# Patient Record
Sex: MALE | Race: WHITE | NOT HISPANIC OR LATINO | URBAN - METROPOLITAN AREA
[De-identification: names, ages, dates, MRNs, and addresses within clinical notes are randomized per-mention and may not be internally consistent; named-entity substitution may affect disease eponyms.]

---

## 2021-07-26 ENCOUNTER — EMERGENCY (EMERGENCY)
Facility: HOSPITAL | Age: 66
LOS: 1 days | Discharge: LEFT BEFORE DISCHARGE | End: 2021-07-26
Attending: EMERGENCY MEDICINE | Admitting: EMERGENCY MEDICINE
Payer: MEDICAID

## 2021-07-26 VITALS
OXYGEN SATURATION: 97 % | WEIGHT: 171.96 LBS | DIASTOLIC BLOOD PRESSURE: 87 MMHG | RESPIRATION RATE: 16 BRPM | HEIGHT: 69 IN | TEMPERATURE: 98 F | HEART RATE: 31 BPM | SYSTOLIC BLOOD PRESSURE: 143 MMHG

## 2021-07-26 PROCEDURE — 99282 EMERGENCY DEPT VISIT SF MDM: CPT

## 2021-07-26 NOTE — ED PROVIDER NOTE - NS ED ROS FT
Constitutional: - Fever, - Chills, - Anorexia, - Fatigue, - Night sweats  Eyes: - Discharge, - Irritation, - Redness, - Visual changes, - Light sensitivity, - Pain  EARS: - Ear Pain, - Tinnitus, - Decreased hearing  NOSE: - Congestion, - Bloody nose  MOUTH/THROAT: - Vocal Changes, - Drooling, - Sore throat  NECK: - Lumps, - Stiffness, - Pain  CV: - Palpitations, - Chest Pain, - Edema, - Syncope  RESP:  - Cough, - Shortness of Breath, - Dyspnea on Exertion, - Trouble speaking, - Pleuritic pain - Wheezing  GI: - Diarrhea, - Constipation, - Bloody stools, - Nausea, - Vomiting, - Abdominal Pain  : - Dysuria, -Frequency, - Hematuria, - Hesitancy, - Incontinence, - Saddle Anesthesia, - Abnormal discharge  MSK: - Myalgias, - Arthralgias, - Weakness, - Deformities, +elbow pain  SKIN: - Color change, - Rash, + Swelling, - Ecchymosis, - Abrasion, - laceration  NEURO: - Change in behavior, - Dec. Alertness, - Headache, - Dizziness, - Change in speech, - Weakness, - Seizure-like activity, - Difficulty ambulating

## 2021-07-26 NOTE — ED PROVIDER NOTE - PROGRESS NOTE DETAILS
The patient has decided to leave against medical advice (AMA).  I have made reasonable attempts to explain to the patient that leaving prior to completion of work up and treatment may result in recurrent or worsening of symptoms, severe permanent disability, pain and suffering, harm, injury, and/or death.  I have explained the risks, benefits, and alternatives to treatment as well as the attendant risks of refusing treatment at this time.  The patient has demonstrated comprehension and verbalizes understanding of these risks.  The patient has been told that they must return to the ER immediately for persistent or recurring symptoms, worsening symptoms, or any concerning symptoms.  The patient has also been informed that they may return to the ER immediately at any time if they change their mind and wish to resume care. The patient has been given the opportunity to ask questions and have them fully answered. pt declining bloodwork, XR, workup for possible septic joint, will leave AMA pt declining bloodwork, XR, workup for possible septic joint, will leave AMA, states he has to tend to his mother at home and can't wait for all these tests, wants to f/u with is pmd

## 2021-07-26 NOTE — ED PROVIDER NOTE - NSFOLLOWUPINSTRUCTIONS_ED_ALL_ED_FT
1) Follow-up with your Primary Medical Doctor and Jose. Call today / next business day for prompt follow-up.  2) Return to Emergency room for any worsening or persistent pain, weakness, fever, or any other concerning symptoms.  3) See attached instruction sheets for additional information, including information regarding signs and symptoms to look out for, reasons to seek immediate care and other important instructions.  4) Follow-up with any specialists as discussed / noted as well.   5) You are leaving against medical advice and understand the risk of having a life threatening condition

## 2021-07-26 NOTE — ED PROVIDER NOTE - PHYSICAL EXAMINATION
Gen: Alert, NAD  Head/eyes: NC/AT, PERRL  ENT: airway patent  Neck: supple, no tenderness/meningismus/JVD, Trachea midline  Pulm/lung: Bilateral clear BS, normal resp effort, no wheeze/stridor/retractions  CV/heart: RRR, no M/R/G, +2 dist pulses (radial, pedal DP/PT, popliteal)  GI/Abd: soft, NT/ND, +BS, no guarding/rebound tenderness  Musculoskeletal: no edema/erythema/cyanosis, FROM in all extremities, no C/T/L spine ttp  Skin: left elbow +swelling, +erythema, +warmth, held in flexion, unable to extend secondary to pain  Neuro: AAOx3, CN 2-12 intact, normal sensation, 5/5 motor strength in all extremities, normal gait Statement Selected

## 2021-07-26 NOTE — ED PROVIDER NOTE - OBJECTIVE STATEMENT
67 yo male hx of gout c/o several days of left elbow pain, swelling, and redness and warmth, nonradiating, moderate, no medications taken, here for evaluation. Denies fever/chills.

## 2021-07-26 NOTE — ED PROVIDER NOTE - CARE PROVIDER_API CALL
Davide Garcia (MD)  Orthopaedic Surgery  [address] [place] [address]  Phone: [phone]  Fax: [phone]  Follow Up Time:

## 2021-07-26 NOTE — ED PROVIDER NOTE - PATIENT PORTAL LINK FT
You can access the FollowMyHealth Patient Portal offered by [facility] by registering at the following website: [email or web address]. By joining [employer]s FollowMyHealth portal, you will also be able to view your health information using other applications (apps) compatible with our system.

## 2022-03-13 ENCOUNTER — EMERGENCY (EMERGENCY)
Facility: HOSPITAL | Age: 67
LOS: 1 days | Discharge: HOME | End: 2022-03-13
Attending: EMERGENCY MEDICINE | Admitting: EMERGENCY MEDICINE
Payer: MEDICAID

## 2022-03-13 VITALS
HEIGHT: 69 IN | DIASTOLIC BLOOD PRESSURE: 88 MMHG | OXYGEN SATURATION: 99 % | HEART RATE: 80 BPM | WEIGHT: 179.9 LBS | SYSTOLIC BLOOD PRESSURE: 150 MMHG | RESPIRATION RATE: 18 BRPM | TEMPERATURE: 99 F

## 2022-03-13 VITALS
DIASTOLIC BLOOD PRESSURE: 82 MMHG | HEART RATE: 80 BPM | RESPIRATION RATE: 18 BRPM | TEMPERATURE: 99 F | SYSTOLIC BLOOD PRESSURE: 145 MMHG | OXYGEN SATURATION: 99 %

## 2022-03-13 PROCEDURE — 99283 EMERGENCY DEPT VISIT LOW MDM: CPT

## 2022-03-13 RX ORDER — LIDOCAINE 4 G/100G
10 CREAM TOPICAL ONCE
Refills: 0 | Status: COMPLETED | OUTPATIENT
Start: 2022-03-13 | End: 2022-03-13

## 2022-03-13 RX ADMIN — Medication 30 MILLILITER(S): at 22:53

## 2022-03-13 RX ADMIN — LIDOCAINE 10 MILLILITER(S): 4 CREAM TOPICAL at 22:53

## 2022-03-13 NOTE — ED PROVIDER NOTE - PROGRESS NOTE DETAILS
pt offered ct neck or other eval, and refusing, wants medicine to make dry throat go away or else wants to leave

## 2022-03-13 NOTE — ED PROVIDER NOTE - CLINICAL SUMMARY MEDICAL DECISION MAKING FREE TEXT BOX
pt with throat dryness states over one week. pt now states feels so dry in throat that gagging despite honey, cough drops. pt just tx with abx by  for sinus infection. pt requesting medication but requests no workup. pt exam op clear, no stridor, lungs cta,

## 2022-03-13 NOTE — ED PROVIDER NOTE - CONSTITUTIONAL, MLM
normal... non toxic, awake, alert, oriented to person, place, time/situation and in no apparent distress.  constantly making gagging noises as if trying to clear throat

## 2022-03-13 NOTE — ED ADULT NURSE NOTE - NSIMPLEMENTINTERV_GEN_ALL_ED
Implemented All Fall Risk Interventions:  [place] to call system. Call bell, personal items and telephone within reach. Instruct patient to call for assistance. Room bathroom lighting operational. Non-slip footwear when patient is off stretcher. Physically safe environment: no spills, clutter or unnecessary equipment. Stretcher in lowest position, wheels locked, appropriate side rails in place. Provide visual cue, wrist band, yellow gown, etc. Monitor gait and stability. Monitor for mental status changes and reorient to person, place, and time. Review medications for side effects contributing to fall risk. Reinforce activity limits and safety measures with patient and family.

## 2022-03-13 NOTE — ED PROVIDER NOTE - OBJECTIVE STATEMENT
pt is a 66 yo male pw cc of one week of throat dry. pt went to  and told sinusitis and tx with abx. pt states no hoarseness or sob, no fb sensation.  pt states no relief with cough drops or honey and requesting medicine to fix throat, refusing tests.  no f/c. oral swelling, stridor, pt has appointment with pmd in am.

## 2022-03-13 NOTE — ED PROVIDER NOTE - CARE PROVIDER_API CALL
Sandeep Gannon (DO)  Surgery  [address] [place] [address]  Phone: [phone]  Fax: [phone]  Follow Up Time: 1-3 Days

## 2022-03-13 NOTE — ED PROVIDER NOTE - PATIENT PORTAL LINK FT
You can access the FollowMyHealth Patient Portal offered by [facility] by registering at the following website: [email or web address]. By joining [employer]’s FollowMyHealth portal, you will also be able to view your health information using other applications (apps) compatible with our system.

## 2022-03-13 NOTE — ED PROVIDER NOTE - NSFOLLOWUPINSTRUCTIONS_ED_ALL_ED_FT
lemon drops, drink lots of fluids,  return for drooling, shortness of breath,  uncontrolled vomiting  call dr Gannon of ent in am for follow up or follow with your doctor

## 2022-03-14 ENCOUNTER — EMERGENCY (EMERGENCY)
Facility: HOSPITAL | Age: 67
LOS: 1 days | Discharge: HOME | End: 2022-03-14
Attending: EMERGENCY MEDICINE | Admitting: EMERGENCY MEDICINE
Payer: MEDICAID

## 2022-03-14 VITALS
OXYGEN SATURATION: 100 % | DIASTOLIC BLOOD PRESSURE: 92 MMHG | HEIGHT: 69 IN | SYSTOLIC BLOOD PRESSURE: 140 MMHG | WEIGHT: 179.9 LBS | TEMPERATURE: 98 F | HEART RATE: 80 BPM | RESPIRATION RATE: 16 BRPM

## 2022-03-14 PROBLEM — M10.9 GOUT, UNSPECIFIED: Chronic | Status: ACTIVE | Noted: 2021-07-26

## 2022-03-14 PROBLEM — N40.0 BENIGN PROSTATIC HYPERPLASIA WITHOUT LOWER URINARY TRACT SYMPTOMS: Chronic | Status: ACTIVE | Noted: 2021-07-26

## 2022-03-14 PROCEDURE — 99283 EMERGENCY DEPT VISIT LOW MDM: CPT

## 2022-03-14 RX ORDER — LIDOCAINE 4 G/100G
5 CREAM TOPICAL
Qty: 40 | Refills: 0
Start: 2022-03-14 | End: 2022-03-21

## 2022-03-14 RX ORDER — LIDOCAINE 4 G/100G
10 CREAM TOPICAL ONCE
Refills: 0 | Status: COMPLETED | OUTPATIENT
Start: 2022-03-14 | End: 2022-03-14

## 2022-03-14 RX ADMIN — Medication 30 MILLILITER(S): at 23:26

## 2022-03-14 RX ADMIN — LIDOCAINE 10 MILLILITER(S): 4 CREAM TOPICAL at 23:26

## 2022-03-14 NOTE — ED ADULT TRIAGE NOTE - CHIEF COMPLAINT QUOTE
pt has had a cough x1 week. Pt states he cant sleep and would like his throat numbed "because they did that yesterday here and it helped". no SOB, no chest pain

## 2022-03-14 NOTE — ED ADULT TRIAGE NOTE - DOMESTIC TRAVEL HIGH RISK QUESTION
Mickie Don was seen and treated in our emergency department on 7/8/2020.  She may return to work on 07/10/2020.       If you have any questions or concerns, please don't hesitate to call.      Audrey Powers PA-C No

## 2022-03-14 NOTE — ED PROVIDER NOTE - PATIENT PORTAL LINK FT
You can access the FollowMyHealth Patient Portal offered by [facility] by registering at the following website: [email or web address]. By joining [email or web address]’s FollowMyHealth portal, you will also be able to view your health information using other applications (apps) compatible with our system.

## 2022-03-14 NOTE — ED PROVIDER NOTE - OBJECTIVE STATEMENT
66 y/o M with dry painful throat after taking antibiotics for his sinus infection.  pt was seen in the ER yesterday and would like similar medication for his pain.  pt declines any labs, images.

## 2022-04-27 ENCOUNTER — TRANSCRIPTION ENCOUNTER (OUTPATIENT)
Age: 67
End: 2022-04-27

## 2022-04-28 ENCOUNTER — EMERGENCY (EMERGENCY)
Facility: HOSPITAL | Age: 67
LOS: 1 days | Discharge: HOME | End: 2022-04-28
Attending: INTERNAL MEDICINE | Admitting: INTERNAL MEDICINE
Payer: MEDICAID

## 2022-04-28 ENCOUNTER — RESULT REVIEW (OUTPATIENT)
Age: 67
End: 2022-04-28

## 2022-04-28 ENCOUNTER — INPATIENT (INPATIENT)
Facility: HOSPITAL | Age: 67
LOS: 4 days | Discharge: HOME | DRG: 853 | End: 2022-05-03
Attending: SURGERY | Admitting: SURGERY
Payer: MEDICAID

## 2022-04-28 VITALS
HEART RATE: 82 BPM | DIASTOLIC BLOOD PRESSURE: 90 MMHG | TEMPERATURE: 98 F | OXYGEN SATURATION: 99 % | RESPIRATION RATE: 15 BRPM | WEIGHT: 175.05 LBS | HEIGHT: 69 IN | SYSTOLIC BLOOD PRESSURE: 160 MMHG

## 2022-04-28 VITALS
SYSTOLIC BLOOD PRESSURE: 107 MMHG | HEART RATE: 147 BPM | WEIGHT: 175.05 LBS | OXYGEN SATURATION: 96 % | RESPIRATION RATE: 22 BRPM | HEIGHT: 69 IN | DIASTOLIC BLOOD PRESSURE: 68 MMHG | TEMPERATURE: 99 F

## 2022-04-28 DIAGNOSIS — K35.80 UNSPECIFIED ACUTE APPENDICITIS: ICD-10-CM

## 2022-04-28 LAB
ALBUMIN SERPL ELPH-MCNC: 3.3 G/DL — SIGNIFICANT CHANGE UP (ref 3.3–5)
ALP SERPL-CCNC: 102 U/L — SIGNIFICANT CHANGE UP (ref 40–120)
ALT FLD-CCNC: 30 U/L — SIGNIFICANT CHANGE UP (ref 12–78)
ANION GAP SERPL CALC-SCNC: 10 MMOL/L — SIGNIFICANT CHANGE UP (ref 5–17)
APPEARANCE UR: ABNORMAL
APTT BLD: 26.7 SEC — LOW (ref 27.5–35.5)
AST SERPL-CCNC: 22 U/L — SIGNIFICANT CHANGE UP (ref 15–37)
BACTERIA # UR AUTO: ABNORMAL
BASOPHILS # BLD AUTO: 0 K/UL — SIGNIFICANT CHANGE UP (ref 0–0.2)
BASOPHILS NFR BLD AUTO: 0 % — SIGNIFICANT CHANGE UP (ref 0–2)
BILIRUB SERPL-MCNC: 1.9 MG/DL — HIGH (ref 0.2–1.2)
BILIRUB UR-MCNC: NEGATIVE — SIGNIFICANT CHANGE UP
BLD GP AB SCN SERPL QL: SIGNIFICANT CHANGE UP
BUN SERPL-MCNC: 30 MG/DL — HIGH (ref 7–23)
CALCIUM SERPL-MCNC: 9.8 MG/DL — SIGNIFICANT CHANGE UP (ref 8.5–10.1)
CHLORIDE SERPL-SCNC: 107 MMOL/L — SIGNIFICANT CHANGE UP (ref 96–108)
CO2 SERPL-SCNC: 21 MMOL/L — LOW (ref 22–31)
COLOR SPEC: YELLOW — SIGNIFICANT CHANGE UP
CREAT SERPL-MCNC: 2.5 MG/DL — HIGH (ref 0.5–1.3)
DIFF PNL FLD: ABNORMAL
EGFR: 28 ML/MIN/1.73M2 — LOW
EOSINOPHIL # BLD AUTO: 0 K/UL — SIGNIFICANT CHANGE UP (ref 0–0.5)
EOSINOPHIL NFR BLD AUTO: 0 % — SIGNIFICANT CHANGE UP (ref 0–6)
EPI CELLS # UR: NEGATIVE — SIGNIFICANT CHANGE UP
GLUCOSE SERPL-MCNC: 119 MG/DL — HIGH (ref 70–99)
GLUCOSE UR QL: NEGATIVE — SIGNIFICANT CHANGE UP
HCT VFR BLD CALC: 42.2 % — SIGNIFICANT CHANGE UP (ref 39–50)
HGB BLD-MCNC: 13.7 G/DL — SIGNIFICANT CHANGE UP (ref 13–17)
INR BLD: 1.19 RATIO — HIGH (ref 0.88–1.16)
KETONES UR-MCNC: NEGATIVE — SIGNIFICANT CHANGE UP
LACTATE SERPL-SCNC: 1.3 MMOL/L — SIGNIFICANT CHANGE UP (ref 0.7–2)
LACTATE SERPL-SCNC: 2.4 MMOL/L — HIGH (ref 0.7–2)
LEUKOCYTE ESTERASE UR-ACNC: ABNORMAL
LYMPHOCYTES # BLD AUTO: 0.08 K/UL — LOW (ref 1–3.3)
LYMPHOCYTES # BLD AUTO: 1 % — LOW (ref 13–44)
MANUAL SMEAR VERIFICATION: SIGNIFICANT CHANGE UP
MCHC RBC-ENTMCNC: 28.5 PG — SIGNIFICANT CHANGE UP (ref 27–34)
MCHC RBC-ENTMCNC: 32.5 GM/DL — SIGNIFICANT CHANGE UP (ref 32–36)
MCV RBC AUTO: 87.9 FL — SIGNIFICANT CHANGE UP (ref 80–100)
MICROCYTES BLD QL: SLIGHT — SIGNIFICANT CHANGE UP
MONOCYTES # BLD AUTO: 0.08 K/UL — SIGNIFICANT CHANGE UP (ref 0–0.9)
MONOCYTES NFR BLD AUTO: 1 % — LOW (ref 2–14)
NEUTROPHILS # BLD AUTO: 7.6 K/UL — HIGH (ref 1.8–7.4)
NEUTROPHILS NFR BLD AUTO: 97 % — HIGH (ref 43–77)
NEUTS BAND # BLD: 1 % — SIGNIFICANT CHANGE UP (ref 0–8)
NITRITE UR-MCNC: NEGATIVE — SIGNIFICANT CHANGE UP
NRBC # BLD: 0 — SIGNIFICANT CHANGE UP
NRBC # BLD: SIGNIFICANT CHANGE UP /100 WBCS (ref 0–0)
PH UR: 6 — SIGNIFICANT CHANGE UP (ref 5–8)
PLAT MORPH BLD: NORMAL — SIGNIFICANT CHANGE UP
PLATELET # BLD AUTO: 245 K/UL — SIGNIFICANT CHANGE UP (ref 150–400)
POLYCHROMASIA BLD QL SMEAR: SLIGHT — SIGNIFICANT CHANGE UP
POTASSIUM SERPL-MCNC: 4.1 MMOL/L — SIGNIFICANT CHANGE UP (ref 3.5–5.3)
POTASSIUM SERPL-SCNC: 4.1 MMOL/L — SIGNIFICANT CHANGE UP (ref 3.5–5.3)
PROT SERPL-MCNC: 7.4 G/DL — SIGNIFICANT CHANGE UP (ref 6–8.3)
PROT UR-MCNC: 100
PROTHROM AB SERPL-ACNC: 14 SEC — HIGH (ref 10.5–13.4)
RBC # BLD: 4.8 M/UL — SIGNIFICANT CHANGE UP (ref 4.2–5.8)
RBC # FLD: 14.8 % — HIGH (ref 10.3–14.5)
RBC BLD AUTO: SIGNIFICANT CHANGE UP
RBC CASTS # UR COMP ASSIST: ABNORMAL /HPF (ref 0–4)
SARS-COV-2 RNA SPEC QL NAA+PROBE: SIGNIFICANT CHANGE UP
SODIUM SERPL-SCNC: 138 MMOL/L — SIGNIFICANT CHANGE UP (ref 135–145)
SP GR SPEC: 1.01 — SIGNIFICANT CHANGE UP (ref 1.01–1.02)
UROBILINOGEN FLD QL: NEGATIVE — SIGNIFICANT CHANGE UP
WBC # BLD: 7.75 K/UL — SIGNIFICANT CHANGE UP (ref 3.8–10.5)
WBC # FLD AUTO: 7.75 K/UL — SIGNIFICANT CHANGE UP (ref 3.8–10.5)
WBC UR QL: ABNORMAL

## 2022-04-28 PROCEDURE — 99284 EMERGENCY DEPT VISIT MOD MDM: CPT

## 2022-04-28 PROCEDURE — 88304 TISSUE EXAM BY PATHOLOGIST: CPT | Mod: 26

## 2022-04-28 PROCEDURE — 93005 ELECTROCARDIOGRAM TRACING: CPT

## 2022-04-28 PROCEDURE — 99223 1ST HOSP IP/OBS HIGH 75: CPT | Mod: 57

## 2022-04-28 PROCEDURE — 44970 LAPAROSCOPY APPENDECTOMY: CPT | Mod: AS

## 2022-04-28 PROCEDURE — 99285 EMERGENCY DEPT VISIT HI MDM: CPT

## 2022-04-28 PROCEDURE — 44970 LAPAROSCOPY APPENDECTOMY: CPT

## 2022-04-28 PROCEDURE — 99283 EMERGENCY DEPT VISIT LOW MDM: CPT

## 2022-04-28 PROCEDURE — 93010 ELECTROCARDIOGRAM REPORT: CPT

## 2022-04-28 PROCEDURE — 74176 CT ABD & PELVIS W/O CONTRAST: CPT | Mod: 26,MA

## 2022-04-28 PROCEDURE — 71045 X-RAY EXAM CHEST 1 VIEW: CPT | Mod: 26

## 2022-04-28 DEVICE — ARISTA 3GR: Type: IMPLANTABLE DEVICE | Status: FUNCTIONAL

## 2022-04-28 DEVICE — STAPLER COVIDIEN TRI-STAPLE CURVED 45MM TAN RELOAD: Type: IMPLANTABLE DEVICE | Status: FUNCTIONAL

## 2022-04-28 RX ORDER — SENNA PLUS 8.6 MG/1
2 TABLET ORAL AT BEDTIME
Refills: 0 | Status: DISCONTINUED | OUTPATIENT
Start: 2022-04-29 | End: 2022-05-03

## 2022-04-28 RX ORDER — ACETAMINOPHEN 500 MG
1000 TABLET ORAL ONCE
Refills: 0 | Status: COMPLETED | OUTPATIENT
Start: 2022-04-28 | End: 2022-04-28

## 2022-04-28 RX ORDER — SODIUM CHLORIDE 9 MG/ML
2500 INJECTION INTRAMUSCULAR; INTRAVENOUS; SUBCUTANEOUS ONCE
Refills: 0 | Status: COMPLETED | OUTPATIENT
Start: 2022-04-28 | End: 2022-04-28

## 2022-04-28 RX ORDER — SODIUM CHLORIDE 9 MG/ML
1000 INJECTION INTRAMUSCULAR; INTRAVENOUS; SUBCUTANEOUS
Refills: 0 | Status: DISCONTINUED | OUTPATIENT
Start: 2022-04-29 | End: 2022-04-29

## 2022-04-28 RX ORDER — SODIUM CHLORIDE 9 MG/ML
1000 INJECTION INTRAMUSCULAR; INTRAVENOUS; SUBCUTANEOUS
Refills: 0 | Status: DISCONTINUED | OUTPATIENT
Start: 2022-04-28 | End: 2022-04-28

## 2022-04-28 RX ORDER — HYDROMORPHONE HYDROCHLORIDE 2 MG/ML
0.5 INJECTION INTRAMUSCULAR; INTRAVENOUS; SUBCUTANEOUS
Refills: 0 | Status: DISCONTINUED | OUTPATIENT
Start: 2022-04-28 | End: 2022-04-29

## 2022-04-28 RX ORDER — ONDANSETRON 8 MG/1
4 TABLET, FILM COATED ORAL ONCE
Refills: 0 | Status: COMPLETED | OUTPATIENT
Start: 2022-04-28 | End: 2022-04-28

## 2022-04-28 RX ORDER — HYDROMORPHONE HYDROCHLORIDE 2 MG/ML
0.5 INJECTION INTRAMUSCULAR; INTRAVENOUS; SUBCUTANEOUS
Refills: 0 | Status: DISCONTINUED | OUTPATIENT
Start: 2022-04-29 | End: 2022-05-03

## 2022-04-28 RX ORDER — SODIUM CHLORIDE 9 MG/ML
1000 INJECTION, SOLUTION INTRAVENOUS
Refills: 0 | Status: DISCONTINUED | OUTPATIENT
Start: 2022-04-28 | End: 2022-04-29

## 2022-04-28 RX ORDER — OXYCODONE HYDROCHLORIDE 5 MG/1
5 TABLET ORAL EVERY 4 HOURS
Refills: 0 | Status: DISCONTINUED | OUTPATIENT
Start: 2022-04-29 | End: 2022-05-03

## 2022-04-28 RX ORDER — PIPERACILLIN AND TAZOBACTAM 4; .5 G/20ML; G/20ML
3.38 INJECTION, POWDER, LYOPHILIZED, FOR SOLUTION INTRAVENOUS ONCE
Refills: 0 | Status: COMPLETED | OUTPATIENT
Start: 2022-04-28 | End: 2022-04-28

## 2022-04-28 RX ORDER — ENOXAPARIN SODIUM 100 MG/ML
40 INJECTION SUBCUTANEOUS EVERY 24 HOURS
Refills: 0 | Status: DISCONTINUED | OUTPATIENT
Start: 2022-04-29 | End: 2022-05-03

## 2022-04-28 RX ORDER — TAMSULOSIN HYDROCHLORIDE 0.4 MG/1
0.8 CAPSULE ORAL ONCE
Refills: 0 | Status: DISCONTINUED | OUTPATIENT
Start: 2022-04-28 | End: 2022-05-03

## 2022-04-28 RX ORDER — PIPERACILLIN AND TAZOBACTAM 4; .5 G/20ML; G/20ML
3.38 INJECTION, POWDER, LYOPHILIZED, FOR SOLUTION INTRAVENOUS EVERY 8 HOURS
Refills: 0 | Status: DISCONTINUED | OUTPATIENT
Start: 2022-04-28 | End: 2022-04-28

## 2022-04-28 RX ORDER — LANOLIN ALCOHOL/MO/W.PET/CERES
5 CREAM (GRAM) TOPICAL AT BEDTIME
Refills: 0 | Status: DISCONTINUED | OUTPATIENT
Start: 2022-04-29 | End: 2022-05-03

## 2022-04-28 RX ORDER — ONDANSETRON 8 MG/1
4 TABLET, FILM COATED ORAL ONCE
Refills: 0 | Status: DISCONTINUED | OUTPATIENT
Start: 2022-04-28 | End: 2022-04-29

## 2022-04-28 RX ORDER — PANTOPRAZOLE SODIUM 20 MG/1
40 TABLET, DELAYED RELEASE ORAL ONCE
Refills: 0 | Status: DISCONTINUED | OUTPATIENT
Start: 2022-04-28 | End: 2022-04-28

## 2022-04-28 RX ORDER — HYDROMORPHONE HYDROCHLORIDE 2 MG/ML
0.5 INJECTION INTRAMUSCULAR; INTRAVENOUS; SUBCUTANEOUS EVERY 4 HOURS
Refills: 0 | Status: DISCONTINUED | OUTPATIENT
Start: 2022-04-28 | End: 2022-04-28

## 2022-04-28 RX ORDER — LIDOCAINE 4 G/100G
15 CREAM TOPICAL ONCE
Refills: 0 | Status: COMPLETED | OUTPATIENT
Start: 2022-04-28 | End: 2022-04-28

## 2022-04-28 RX ORDER — PIPERACILLIN AND TAZOBACTAM 4; .5 G/20ML; G/20ML
3.38 INJECTION, POWDER, LYOPHILIZED, FOR SOLUTION INTRAVENOUS EVERY 8 HOURS
Refills: 0 | Status: DISCONTINUED | OUTPATIENT
Start: 2022-04-29 | End: 2022-04-29

## 2022-04-28 RX ORDER — PANTOPRAZOLE SODIUM 20 MG/1
40 TABLET, DELAYED RELEASE ORAL DAILY
Refills: 0 | Status: DISCONTINUED | OUTPATIENT
Start: 2022-04-29 | End: 2022-05-03

## 2022-04-28 RX ORDER — ONDANSETRON 8 MG/1
4 TABLET, FILM COATED ORAL EVERY 6 HOURS
Refills: 0 | Status: DISCONTINUED | OUTPATIENT
Start: 2022-04-28 | End: 2022-04-28

## 2022-04-28 RX ORDER — ACETAMINOPHEN 500 MG
650 TABLET ORAL EVERY 6 HOURS
Refills: 0 | Status: DISCONTINUED | OUTPATIENT
Start: 2022-04-29 | End: 2022-05-03

## 2022-04-28 RX ORDER — PANTOPRAZOLE SODIUM 20 MG/1
40 TABLET, DELAYED RELEASE ORAL
Refills: 0 | Status: DISCONTINUED | OUTPATIENT
Start: 2022-04-28 | End: 2022-05-01

## 2022-04-28 RX ADMIN — PIPERACILLIN AND TAZOBACTAM 200 GRAM(S): 4; .5 INJECTION, POWDER, LYOPHILIZED, FOR SOLUTION INTRAVENOUS at 12:49

## 2022-04-28 RX ADMIN — LIDOCAINE 15 MILLILITER(S): 4 CREAM TOPICAL at 04:38

## 2022-04-28 RX ADMIN — SODIUM CHLORIDE 2500 MILLILITER(S): 9 INJECTION INTRAMUSCULAR; INTRAVENOUS; SUBCUTANEOUS at 12:49

## 2022-04-28 RX ADMIN — HYDROMORPHONE HYDROCHLORIDE 0.5 MILLIGRAM(S): 2 INJECTION INTRAMUSCULAR; INTRAVENOUS; SUBCUTANEOUS at 22:58

## 2022-04-28 RX ADMIN — ONDANSETRON 4 MILLIGRAM(S): 8 TABLET, FILM COATED ORAL at 12:48

## 2022-04-28 RX ADMIN — PIPERACILLIN AND TAZOBACTAM 3.38 GRAM(S): 4; .5 INJECTION, POWDER, LYOPHILIZED, FOR SOLUTION INTRAVENOUS at 12:19

## 2022-04-28 RX ADMIN — SODIUM CHLORIDE 75 MILLILITER(S): 9 INJECTION, SOLUTION INTRAVENOUS at 23:01

## 2022-04-28 RX ADMIN — HYDROMORPHONE HYDROCHLORIDE 0.5 MILLIGRAM(S): 2 INJECTION INTRAMUSCULAR; INTRAVENOUS; SUBCUTANEOUS at 23:13

## 2022-04-28 RX ADMIN — Medication 15 MILLILITER(S): at 04:38

## 2022-04-28 RX ADMIN — Medication 400 MILLIGRAM(S): at 12:49

## 2022-04-28 RX ADMIN — Medication 1000 MILLIGRAM(S): at 13:19

## 2022-04-28 RX ADMIN — PIPERACILLIN AND TAZOBACTAM 200 GRAM(S): 4; .5 INJECTION, POWDER, LYOPHILIZED, FOR SOLUTION INTRAVENOUS at 23:32

## 2022-04-28 RX ADMIN — Medication 1000 MILLIGRAM(S): at 13:04

## 2022-04-28 RX ADMIN — PANTOPRAZOLE SODIUM 40 MILLIGRAM(S): 20 TABLET, DELAYED RELEASE ORAL at 04:48

## 2022-04-28 NOTE — PATIENT PROFILE ADULT - FALL HARM RISK - UNIVERSAL INTERVENTIONS
Bed in lowest position, wheels locked, appropriate side rails in place/Call bell, personal items and telephone in reach/Instruct patient to call for assistance before getting out of bed or chair/Non-slip footwear when patient is out of bed/[place] to call system/Physically safe environment - no spills, clutter or unnecessary equipment/Purposeful Proactive Rounding/Room/bathroom lighting operational, light cord in reach

## 2022-04-28 NOTE — ED ADULT NURSE NOTE - OBJECTIVE STATEMENT
Pt. received alert and oriented x3 with chief complaint of body aches and chills since 1 hour prior to arrival to ED. Pt. placed on continuous cardiac monitor with continuous pulse ox. EKG performed at bedside upon arrival.

## 2022-04-28 NOTE — ED PROVIDER NOTE - PHYSICAL EXAMINATION
Vital signs as available reviewed.  General:  No acute distress.  Head:  Normocephalic, atraumatic.  Eyes:  Conjunctiva pink, no icterus.  Cardiovascular: tachycardia.  Respiratory:  Clear to auscultation, good air entry bilaterally.  Abdomen:  Soft,+ RLQ and right flank tenderness to palpation.  Musculoskeletal:  No obvious deformity.  Neurologic: Alert and oriented, moving all extremities.  Skin:  Warm and dry.

## 2022-04-28 NOTE — ED PROVIDER NOTE - CARE PROVIDER_API CALL
Connor Silva ()  Internal Medicine  [address] [place] [address]  Phone: [phone]  Fax: [phone]  Follow Up Time: 1-3 Days

## 2022-04-28 NOTE — H&P ADULT - ASSESSMENT
66 YO Male w/ PMHx of dry mouth, BPH (self-catheterizes), ?renal failure p/w abdominal pain, fever, chills, N/V x 1 day, CT with evidence of acute appendicitis & enlarged prostate. Temp of 100.2, WBC normal, BUN/Cr elevated 68 YO Male w/ PMHx of dry mouth, BPH (self-catheterizes), ?renal failure p/w abdominal pain, fever, chills, N/V x 1 day, CT with evidence of acute appendicitis & enlarged prostate. Temp of 100.2, WBC normal, BUN/Cr elevated      Mr. Aguillon was seen preoperatively in the holding area.      The history was confirmed.    An examination was personally performed.      The CT scan report and associated images were reviewed and discussed with in-house Radiology.    The risks, benefits and nature of the operation were discussed at length.      These points included, but were not limited to the possibility of conversion to open surgery, the possibility of bowel resection or enterotomy, the approximate size/ number/ location of the incisions, the probability of "scars" and the possibility of residual abdominal wall deformity.  We have discussed that appendicitis represents an infectious process and that infection of the skin or an intra-abdominal abscess post-operatively is possible.  We have discussed the possibility of open incision sites with packing or wound care.  We have discussed the possible use of intra-peritoneal drains.  We have discussed that a "normal" appendix is possible and that no "guarantee" of a diagnosis can be provided.  We have discussed that further additional pathology may be noted during laparoscopy.      We have discussed that all abdominal surgery creates the possibility of abdominal wall hernia and future intra-abdominal adhesions.  We have discussed that medical complications unrelated to the operation itself are possible, including but not limited to cardiopulmonary issues, deep vein thrombosis/ pulmonary embolism and urinary retention.  We have discussed that general anesthesia with intubation is required and that a Gavin catheter may be placed.       Mr. Aguillon demonstrated good understanding and remained eager to proceed.  An intravenous dosing of antibiotic was provided prior to the initiation of the procedure.

## 2022-04-28 NOTE — ED PROVIDER NOTE - OBJECTIVE STATEMENT
67 M history of dry mouth, BPH (self catheterizes) here complaining of abdominal pain, fevers, chills, nausea. started last night. Came to the ER for feeling of too much acid, got meds and refused further work up. now with RLQ pain, severe in nature, radiates to groin, intermittent. normal BMs, no vomiting, constipation, diarrhea. denies surgical history. recently started a medication for dry mouth, does not remember name. PMD Dr. Malloy.

## 2022-04-28 NOTE — ED PROVIDER NOTE - OBJECTIVE STATEMENT
Pt c/o stomach burning" after medication last night, states he took a Pepcid just prior to arrival  abdominal pain Pt c/o epigastric stomach burning" after medication last night, states he took a Pepcid just prior to arrival  abdominal pain 68 y/o male Pt c/o epigastric stomach burning" after medication last night, states he took a Pepcid just prior to arrival  no cp, no sob, no N/V, no GIB no fever, no chills, no urinary symptoms, no stroke symptoms

## 2022-04-28 NOTE — ED ADULT NURSE NOTE - OBJECTIVE STATEMENT
67 yr old male c/o epigastric burning that worsened tonight. A+O  x 4. Denies CP, sob, abdominal pain, NVD. PT refusing IV, labs, and ekg. Dr. Kong aware. will continue to monitor.

## 2022-04-28 NOTE — ED PROVIDER NOTE - CLINICAL SUMMARY MEDICAL DECISION MAKING FREE TEXT BOX
acute epigastric burning  patient is declining IV labs ekg enzymes  he will take PO medications and is requesting discharge, GI referral given

## 2022-04-28 NOTE — H&P ADULT - NSHPPHYSICALEXAM_GEN_ALL_CORE
PHYSICAL EXAM:  GENERAL: NAD, lying in bed comfortably  HEAD:  Atraumatic, Normocephalic  ABDOMEN: Soft, nondistended, +ttp in RLQ. No scars or lesions noted.  NERVOUS SYSTEM:  Alert & Oriented X3, speech clear. No deficits

## 2022-04-28 NOTE — H&P ADULT - PROBLEM SELECTOR PLAN 1
- OR today for laparoscopic possible open appendectomy  - Dr. Montes called- will be away this week, Dr. Vail (pulmonology) is aware and will see patient post-operatively  - Poss urology and/or nephro consult post-op  - NPO, IVF  - IV Zosyn given  - Pain control, supportive care  - Case discussed with Dr. Arellano

## 2022-04-28 NOTE — H&P ADULT - NSHPLABSRESULTS_GEN_ALL_CORE
Vital Signs Last 24 Hrs  T(C): 37.9 (28 Apr 2022 13:41), Max: 37.9 (28 Apr 2022 13:41)  T(F): 100.2 (28 Apr 2022 13:41), Max: 100.2 (28 Apr 2022 13:41)  HR: 99 (28 Apr 2022 13:41) (82 - 147)  BP: 114/74 (28 Apr 2022 13:41) (107/68 - 160/90)  BP(mean): --  RR: 18 (28 Apr 2022 13:41) (15 - 22)  SpO2: 99% (28 Apr 2022 13:41) (96% - 99%)                          13.7   7.75  )-----------( 245      ( 28 Apr 2022 13:04 )             42.2   04-28    138  |  107  |  30<H>  ----------------------------<  119<H>  4.1   |  21<L>  |  2.50<H>    Ca    9.8      28 Apr 2022 13:04    TPro  7.4  /  Alb  3.3  /  TBili  1.9<H>  /  DBili  x   /  AST  22  /  ALT  30  /  AlkPhos  102  04-28  < from: CT Abdomen and Pelvis No Cont (04.28.22 @ 14:22) >    FINDINGS:    LOWER CHEST:  Mild dependent bibasilar atelectatic changes.    The evaluation of the solid organ parenchyma is limited without   intravenous contrast.    LIVER: Within normal limits.  BILE DUCTS: Normal caliber.  GALLBLADDER: Within normal limits.  SPLEEN: Borderline enlarged.  PANCREAS: Within normal limits.  ADRENALS: Within normal limits.  KIDNEYS/URETERS:  Bilateral renal cysts and several small indeterminate hypodense right   renal lesions.  Small hyperdense lesion medial interpolar left kidney.  Atrophic right kidney.    Prominent right extrarenal pelvis.  Mild left-sided hydroureteronephrosis, without obstructing ureteral   calculus.    BLADDER: Small focus of air within the urinary bladder, correlate   clinically for recent instrumentation versus infection.  REPRODUCTIVE ORGANS: Markedly enlarged prostate gland with intravesicular   extension.    BOWEL: Small hiatal hernia.  No bowel obstruction.  Dilated appendix, measuring up to 2 cm, with 1 cm calcified appendicolith   at base and 1.4 cm calcified appendicolith within body. There is   significant periappendiceal stranding, with inflammatory stranding   extending along the right posterior flank and pelvis.  There is thickening at the cecal apex.  PERITONEUM: No ascites.  No localized intra-abdominal fluid collection or pneumoperitoneum noted.    VESSELS: Within normal limits.  RETROPERITONEUM/LYMPH NODES: No lymphadenopathy.    ABDOMINAL WALL: Small bilateral fat-containing inguinal hernias.    BONES: Within normal limits.    IMPRESSION:    CT findings compatible with acute appendicitis as discussed above.    Prostatomegaly.    Other findings as discussed above.

## 2022-04-28 NOTE — H&P ADULT - HISTORY OF PRESENT ILLNESS
68 YO Male w/ PMHx of dry mouth, BPH (self-catheterizes), ?renal failure p/w abdominal pain x 1 day. Patient seen in ED last night but apparently refused further work-up. Patient returned to ED today c/o RLQ pain radiating to the groin, N/V, chills and subjective fever since last night. Admits to diarrhea a few days ago, which has resolved. Patient has not eaten since yesterday. Denies chest pain, SOB, hematochezia, hematemesis, constipation, past surgical history. Patient had a colonoscopy >10 yrs ago, but cannot recall the results. Of note, patient recently started seeing Dr. Montes and was last seen 2 weeks ago where he was referred to a nephrologist due to concern for renal failure and need for dialysis, but patient could not recall exactly who he saw and where he went.

## 2022-04-28 NOTE — ED PROVIDER NOTE - PATIENT PORTAL LINK FT
You can access the FollowMyHealth Patient Portal offered by [facility] by registering at the following website: [email or web address]. By joining [employer]’s FollowMyHealth portal, you will also be able to view your health information using other applications (apps) compatible with our system.

## 2022-04-28 NOTE — ED PROVIDER NOTE - NS ED ROS FT
Constitutional: No + fevers, chills.  Neurological: No reported acute headache.  Eyes: No reported new vision changes.   Ears, Nose, Mouth, Throat: No reported acute sore throat.  Cardiovascular: No reported current chest pain.  Respiratory: No reported new shortness of breath.  Gastrointestinal: + abdominal pain.  Genitourinary: No reported new urinary problems.  Musculoskeletal: No reported acute extremity pain.  Integumentary (skin and/or breast): No reported new rash.

## 2022-04-28 NOTE — ED ADULT NURSE REASSESSMENT NOTE - NS ED NURSE REASSESS COMMENT FT1
Pt. caught drinking bottle of water after repeatedly being told by PATRIZIA Galvan not to eat or drink anything since pt. has arrived to ED. PATRIZIA Welch in OR notified.

## 2022-04-29 LAB
-  CTX-M RESISTANCE MARKER: SIGNIFICANT CHANGE UP
-  ESBL: SIGNIFICANT CHANGE UP
ALBUMIN SERPL ELPH-MCNC: 2.6 G/DL — LOW (ref 3.3–5)
ALP SERPL-CCNC: 85 U/L — SIGNIFICANT CHANGE UP (ref 40–120)
ALT FLD-CCNC: 30 U/L — SIGNIFICANT CHANGE UP (ref 12–78)
ANION GAP SERPL CALC-SCNC: 8 MMOL/L — SIGNIFICANT CHANGE UP (ref 5–17)
AST SERPL-CCNC: 21 U/L — SIGNIFICANT CHANGE UP (ref 15–37)
BILIRUB SERPL-MCNC: 1 MG/DL — SIGNIFICANT CHANGE UP (ref 0.2–1.2)
BUN SERPL-MCNC: 35 MG/DL — HIGH (ref 7–23)
CALCIUM SERPL-MCNC: 8.7 MG/DL — SIGNIFICANT CHANGE UP (ref 8.5–10.1)
CHLORIDE SERPL-SCNC: 107 MMOL/L — SIGNIFICANT CHANGE UP (ref 96–108)
CO2 SERPL-SCNC: 23 MMOL/L — SIGNIFICANT CHANGE UP (ref 22–31)
CREAT SERPL-MCNC: 2.7 MG/DL — HIGH (ref 0.5–1.3)
E COLI DNA BLD POS QL NAA+NON-PROBE: SIGNIFICANT CHANGE UP
EGFR: 25 ML/MIN/1.73M2 — LOW
GLUCOSE SERPL-MCNC: 126 MG/DL — HIGH (ref 70–99)
GRAM STN FLD: SIGNIFICANT CHANGE UP
HCT VFR BLD CALC: 37.8 % — LOW (ref 39–50)
HCV AB S/CO SERPL IA: 0.09 S/CO — SIGNIFICANT CHANGE UP (ref 0–0.99)
HCV AB SERPL-IMP: SIGNIFICANT CHANGE UP
HGB BLD-MCNC: 12.1 G/DL — LOW (ref 13–17)
MCHC RBC-ENTMCNC: 28.5 PG — SIGNIFICANT CHANGE UP (ref 27–34)
MCHC RBC-ENTMCNC: 32 GM/DL — SIGNIFICANT CHANGE UP (ref 32–36)
MCV RBC AUTO: 89.2 FL — SIGNIFICANT CHANGE UP (ref 80–100)
METHOD TYPE: SIGNIFICANT CHANGE UP
NRBC # BLD: 0 /100 WBCS — SIGNIFICANT CHANGE UP (ref 0–0)
PLATELET # BLD AUTO: 188 K/UL — SIGNIFICANT CHANGE UP (ref 150–400)
POTASSIUM SERPL-MCNC: 4.4 MMOL/L — SIGNIFICANT CHANGE UP (ref 3.5–5.3)
POTASSIUM SERPL-SCNC: 4.4 MMOL/L — SIGNIFICANT CHANGE UP (ref 3.5–5.3)
PROT SERPL-MCNC: 6.1 G/DL — SIGNIFICANT CHANGE UP (ref 6–8.3)
RBC # BLD: 4.24 M/UL — SIGNIFICANT CHANGE UP (ref 4.2–5.8)
RBC # FLD: 15.3 % — HIGH (ref 10.3–14.5)
SODIUM SERPL-SCNC: 138 MMOL/L — SIGNIFICANT CHANGE UP (ref 135–145)
SPECIMEN SOURCE: SIGNIFICANT CHANGE UP
SPECIMEN SOURCE: SIGNIFICANT CHANGE UP
WBC # BLD: 20.26 K/UL — HIGH (ref 3.8–10.5)
WBC # FLD AUTO: 20.26 K/UL — HIGH (ref 3.8–10.5)

## 2022-04-29 PROCEDURE — 93010 ELECTROCARDIOGRAM REPORT: CPT

## 2022-04-29 PROCEDURE — 99222 1ST HOSP IP/OBS MODERATE 55: CPT

## 2022-04-29 RX ORDER — MEROPENEM 1 G/30ML
500 INJECTION INTRAVENOUS EVERY 12 HOURS
Refills: 0 | Status: DISCONTINUED | OUTPATIENT
Start: 2022-04-29 | End: 2022-05-02

## 2022-04-29 RX ADMIN — PANTOPRAZOLE SODIUM 40 MILLIGRAM(S): 20 TABLET, DELAYED RELEASE ORAL at 11:26

## 2022-04-29 RX ADMIN — SENNA PLUS 2 TABLET(S): 8.6 TABLET ORAL at 21:45

## 2022-04-29 RX ADMIN — Medication 400 MILLIGRAM(S): at 05:32

## 2022-04-29 RX ADMIN — SODIUM CHLORIDE 50 MILLILITER(S): 9 INJECTION INTRAMUSCULAR; INTRAVENOUS; SUBCUTANEOUS at 14:58

## 2022-04-29 RX ADMIN — SODIUM CHLORIDE 100 MILLILITER(S): 9 INJECTION INTRAMUSCULAR; INTRAVENOUS; SUBCUTANEOUS at 11:24

## 2022-04-29 RX ADMIN — ENOXAPARIN SODIUM 40 MILLIGRAM(S): 100 INJECTION SUBCUTANEOUS at 14:59

## 2022-04-29 RX ADMIN — MEROPENEM 100 MILLIGRAM(S): 1 INJECTION INTRAVENOUS at 18:11

## 2022-04-29 RX ADMIN — PIPERACILLIN AND TAZOBACTAM 25 GRAM(S): 4; .5 INJECTION, POWDER, LYOPHILIZED, FOR SOLUTION INTRAVENOUS at 08:28

## 2022-04-29 NOTE — CONSULT NOTE ADULT - SUBJECTIVE AND OBJECTIVE BOX
Patient is a 67y old  Male who presents with a chief complaint of acute appendicitis (2022 08:29)      HISTORY OF PRESENT ILLNESS:  66 y/o male, POD#1 from lap appendectomy, incidental finding of atrophic kidneys and mild hydronephrosis/extrarenal pelvis (see CT report below).  Pt is a poor historian.  He has been self catheterizing multiple times a day for 5-6 years now for urinary retention.  He does not recall the name of the urologist that he saw in the past.    PAST MEDICAL & SURGICAL HISTORY:  Gout    Enlarged prostate    No significant past surgical history        MEDICATIONS  (STANDING):  enoxaparin Injectable 40 milliGRAM(s) SubCutaneous every 24 hours  pantoprazole  Injectable 40 milliGRAM(s) IV Push daily  piperacillin/tazobactam IVPB.. 3.375 Gram(s) IV Intermittent every 8 hours  senna 2 Tablet(s) Oral at bedtime  sodium chloride 0.9%. 1000 milliLiter(s) (100 mL/Hr) IV Continuous <Continuous>  tamsulosin 0.8 milliGRAM(s) Oral once    MEDICATIONS  (PRN):  acetaminophen     Tablet .. 650 milliGRAM(s) Oral every 6 hours PRN Temp greater or equal to 38C (100.4F), Mild Pain (1 - 3)  HYDROmorphone  Injectable 0.5 milliGRAM(s) IV Push every 3 hours PRN Severe Pain (7 - 10)  melatonin 5 milliGRAM(s) Oral at bedtime PRN Insomnia  oxyCODONE    IR 5 milliGRAM(s) Oral every 4 hours PRN Moderate Pain (4 - 6)      Allergies    No Known Allergies      SOCIAL HISTORY:   Smoking: never   Work: self employed       FAMILY HISTORY:      Vital Signs Last 24 Hrs  T(C): 36.4 (2022 05:22), Max: 37.9 (2022 13:41)  T(F): 97.5 (2022 05:22), Max: 100.2 (2022 13:41)  HR: 63 (2022 05:22) (63 - 147)  BP: 124/74 (2022 05:22) (104/72 - 161/86)  BP(mean): --  RR: 18 (2022 05:22) (14 - 22)  SpO2: 95% (2022 05:22) (94% - 99%)    PHYSICAL EXAM:    Constitutional: NAD, well-developed  HEENT: PERRLA, EOMI  Neck: Supple, full ROM  Back: No CVA tenderness  Respiratory: Normal repiratory effort  Cardiovascular: RRR  Abd: s/p lap appy  : Normal uncircumcised phallus.  14 Fr castillo in situ  SHU: prostate smooth, non-tender  Extremities: No peripheral edema      LABS:                        12.1   20.26 )-----------( 188      ( 2022 04:36 )             37.8     04    138  |  107  |  35<H>  ----------------------------<  126<H>  4.4   |  23  |  2.70<H>    Ca    8.7      2022 04:36    TPro  6.1  /  Alb  2.6<L>  /  TBili  1.0  /  DBili  x   /  AST  21  /  ALT  30  /  AlkPhos  85      PT/INR - ( 2022 13:04 )   PT: 14.0 sec;   INR: 1.19 ratio         PTT - ( 2022 13:04 )  PTT:26.7 sec  Urinalysis Basic - ( 2022 13:12 )    Color: Yellow / Appearance: Slightly Turbid / S.015 / pH: x  Gluc: x / Ketone: Negative  / Bili: Negative / Urobili: Negative   Blood: x / Protein: 100 / Nitrite: Negative   Leuk Esterase: Moderate / RBC: 6-10 /HPF / WBC 11-25   Sq Epi: x / Non Sq Epi: Negative / Bacteria: Occasional      Urine Culture:  @ 18:00  Urine Culure Resuls   Growth in aerobic bottle: Gram Variable Rods  ***Blood Panel PCR results on this specimen are available  approximately 3 hours after the Gram stain result.***  Gram stain, PCR, and/or culture results may not always  correspond due to difference in methodologies.  ************************************************************  This PCR assay was performed by multiplex PCR. This  Assay tests for 66 bacterial and resistance gene targets.  Please refer to the [facility] Labs test directory  at [email or web address] for details.  Organism Blood Culture PCR        RADIOLOGY & ADDITIONAL STUDIES:      ACC: [id number] EXAM:  CT ABDOMEN AND PELVIS                          PROCEDURE DATE:  2022          INTERPRETATION:  CLINICAL INFORMATION: Right lower quadrant abdominal   pain, radiating to groin.    COMPARISON: None.    CONTRAST/COMPLICATIONS:  IV Contrast: NONE  Oral Contrast: NONE  Complications: None reported at time of study completion    PROCEDURE:  CT of the Abdomen and Pelvis was performed.  Sagittal and coronal reformats were performed.    FINDINGS:    LOWER CHEST:  Mild dependent bibasilar atelectatic changes.    The evaluation of the solid organ parenchyma is limited without   intravenous contrast.    LIVER: Within normal limits.  BILE DUCTS: Normal caliber.  GALLBLADDER: Within normal limits.  SPLEEN: Borderline enlarged.  PANCREAS: Within normal limits.  ADRENALS: Within normal limits.  KIDNEYS/URETERS:  Bilateral renal cysts and several small indeterminate hypodense right   renal lesions.  Small hyperdense lesion medial interpolar left kidney.  Atrophic right kidney.    Prominent right extrarenal pelvis.  Mild left-sided hydroureteronephrosis, without obstructing ureteral   calculus.    BLADDER: Small focus of air within the urinary bladder, correlate   clinically for recent instrumentation versus infection.  REPRODUCTIVE ORGANS: Markedly enlarged prostate gland with intravesicular   extension.    BOWEL: Small hiatal hernia.  No bowel obstruction.  Dilated appendix, measuring up to 2 cm, with 1 cm calcified appendicolith   at base and 1.4 cm calcified appendicolith within body. There is   significant periappendiceal stranding, with inflammatory stranding   extending along the right posterior flank and pelvis.  There is thickening at the cecal apex.  PERITONEUM: No ascites.  No localized intra-abdominal fluid collection or pneumoperitoneum noted.    VESSELS: Within normal limits.  RETROPERITONEUM/LYMPH NODES: No lymphadenopathy.    ABDOMINAL WALL: Small bilateral fat-containing inguinal hernias.    BONES: Within normal limits.    IMPRESSION:    CT findings compatible with acute appendicitis as discussed above.    Prostatomegaly.    Other findings as discussed above.    
[facility]  INFECTIOUS DISEASES   [address] [place] [address]  Tel: [phone]     Fax: [phone]  ======================================================  MD Jessica Alicea Kaushal, MD Cho, Michelle, MD   ======================================================    MRN-[id number]  FLOYD BRINA     Reason for consult; Bacteremia and acute appendicitis     HPI:  66 yo man with PMH of BPH and CKD/GABBY was admitted with abdominal pain for one day. CT consistent with Acute appendicitis so taken to OR for Lap appendectomy on .   At presentation he was tachycardic and tachypneic with high lactate, in OR was found with perforated appendicitis. He was started on zosyn. Today Blood culture is back positive for ESBL ecoli.     PAST MEDICAL & SURGICAL HISTORY:  Gout  Enlarged prostate    No significant past surgical history    Social Hx:  No smoking, ETOH or drugs     FAMILY HISTORY: No pertinent medical history     Allergies  No Known Allergies    Antibiotics:  MEDICATIONS  (STANDING):  enoxaparin Injectable 40 milliGRAM(s) SubCutaneous every 24 hours  meropenem  IVPB 500 milliGRAM(s) IV Intermittent every 12 hours  pantoprazole  Injectable 40 milliGRAM(s) IV Push daily  senna 2 Tablet(s) Oral at bedtime  sodium chloride 0.9%. 1000 milliLiter(s) (50 mL/Hr) IV Continuous <Continuous>  tamsulosin 0.8 milliGRAM(s) Oral once     REVIEW OF SYSTEMS:  CONSTITUTIONAL:  No Fever or chills  HEENT:  No diplopia or blurred vision.  No sore throat or runny nose.  CARDIOVASCULAR:  No chest pain or SOB.  RESPIRATORY:  No cough, shortness of breath, PND or orthopnea.  GASTROINTESTINAL:  No nausea, vomiting or diarrhea.  GENITOURINARY:  No dysuria, frequency or urgency. No Blood in urine  MUSCULOSKELETAL:  no joint aches, no muscle pain  SKIN:  No change in skin, hair or nails.  NEUROLOGIC:  No paresthesias, fasciculations, seizures or weakness.  PSYCHIATRIC:  No disorder of thought or mood.  ENDOCRINE:  No heat or cold intolerance, polyuria or polydipsia.  HEMATOLOGICAL:  No easy bruising or bleeding.     Physical Exam:  Vital Signs Last 24 Hrs  T(C): 36.4 (2022 05:22), Max: 37.9 (2022 13:41)  T(F): 97.5 (2022 05:22), Max: 100.2 (2022 13:41)  HR: 63 (2022 05:22) (63 - 104)  BP: 124/74 (2022 05:22) (104/72 - 161/86)  RR: 18 (2022 05:22) (14 - 19)  SpO2: 95% (2022 05:22) (94% - 99%)  GEN: NAD  HEENT: normocephalic and atraumatic. EOMI. PERRL.    NECK: Supple.  No lymphadenopathy   LUNGS: Clear to auscultation.  HEART: Regular rate and rhythm without murmur.  ABDOMEN: Soft, nontender, and nondistended.  Positive bowel sounds.    : No CVA tenderness  EXTREMITIES: Without any cyanosis, clubbing, rash, lesions or edema.  NEUROLOGIC: grossly intact.  PSYCHIATRIC: Appropriate affect .  SKIN: No ulceration or induration present.    Labs:      138  |  107  |  35<H>  ----------------------------<  126<H>  4.4   |  23  |  2.70<H>    Ca    8.7      2022 04:36    TPro  6.1  /  Alb  2.6<L>  /  TBili  1.0  /  DBili  x   /  AST  21  /  ALT  30  /  AlkPhos  85                          12.1   20.26 )-----------( 188      ( 2022 04:36 )             37.8     PT/INR - ( 2022 13:04 )   PT: 14.0 sec;   INR: 1.19 ratio    PTT - ( 2022 13:04 )  PTT:26.7 sec  Urinalysis Basic - ( 2022 13:12 )    Color: Yellow / Appearance: Slightly Turbid / S.015 / pH: x  Gluc: x / Ketone: Negative  / Bili: Negative / Urobili: Negative   Blood: x / Protein: 100 / Nitrite: Negative   Leuk Esterase: Moderate / RBC: 6-10 /HPF / WBC 11-25   Sq Epi: x / Non Sq Epi: Negative / Bacteria: Occasional    LIVER FUNCTIONS - ( 2022 04:36 )  Alb: 2.6 g/dL / Pro: 6.1 g/dL / ALK PHOS: 85 U/L / ALT: 30 U/L / AST: 21 U/L / GGT: x           COVID-19 PCR: NotDetec (22 @ 13:04)    RECENT CULTURES:   @ 18:00 .Blood Blood-Peripheral Blood Culture PCR    Growth in aerobic bottle: Gram Variable Rods  Growth in anaerobic bottle: Gram Negative Rods  ***Blood Panel PCR results on this specimen are available  approximately 3 hours after the Gram stain result.***  Gram stain, PCR, and/or culture results may not always  correspond due to difference in methodologies.  ************************************************************  This PCR assay was performed by multiplex PCR. This  Assay tests for 66 bacterial and resistance gene targets.  Please refer to the [facility] Labs test directory  at [email or web address] for details.    Growth in aerobic bottle: Gram Variable Rods  Growth in anaerobic bottle: Gram Negative Rods    All imaging and other data have been reviewed.  < from: CT Abdomen and Pelvis No Cont (22 @ 14:22) >  ACC: [id number] EXAM:  CT ABDOMEN AND PELVIS                        PROCEDURE DATE:  2022    INTERPRETATION:  CLINICAL INFORMATION: Right lower quadrant abdominal   pain, radiating to groin.  COMPARISON: None.  CONTRAST/COMPLICATIONS:  IV Contrast: NONE  Oral Contrast: NONE  Complications: None reported at time of study completion  PROCEDURE:  CT of the Abdomen and Pelvis was performed.  Sagittal and coronal reformats were performed.  FINDINGS:  LOWER CHEST:  Mild dependent bibasilar atelectatic changes.  The evaluation of the solid organ parenchyma is limited without   intravenous contrast.  LIVER: Within normal limits.  BILE DUCTS: Normal caliber.  GALLBLADDER: Within normal limits.  SPLEEN: Borderline enlarged.  PANCREAS: Within normal limits.  ADRENALS: Within normal limits.  KIDNEYS/URETERS:  Bilateral renal cysts and several small indeterminate hypodense right   renal lesions.  Small hyperdense lesion medial interpolar left kidney.  Atrophic right kidney.  Prominent right extrarenal pelvis.  Mild left-sided hydroureteronephrosis, without obstructing ureteral   calculus.  BLADDER: Small focus of air within the urinary bladder, correlate   clinically for recent instrumentation versus infection.  REPRODUCTIVE ORGANS: Markedly enlarged prostate gland with intravesicular   extension.  BOWEL: Small hiatal hernia.  No bowel obstruction.  Dilated appendix, measuring up to 2 cm, with 1 cm calcified appendicolith   at base and 1.4 cm calcified appendicolith within body. There is   significant periappendiceal stranding, with inflammatory stranding   extending along the right posterior flank and pelvis.  There is thickening at the cecal apex.  PERITONEUM: No ascites.  No localized intra-abdominal fluid collection or pneumoperitoneum noted.  VESSELS: Within normal limits.  RETROPERITONEUM/LYMPH NODES: No lymphadenopathy.  ABDOMINAL WALL: Small bilateral fat-containing inguinal hernias.  BONES: Within normal limits.  IMPRESSION:  CT findings compatible with acute appendicitis as discussed above.  Prostatomegaly.    Assessment and Plan:   66 yo man with PMH of BPH and CKD/GABBY was admitted with abdominal pain for one day. CT consistent with Acute appendicitis so taken to OR for Lap appendectomy on .   Today Blood culture is back positive for ESBL ecoli.   Most likely due to perforated appendicitis, even though has an enlarged Prostate but he is asymptomatic.     Bacteremia   - Blood culture with ESBL Ecoli  - Will repeat blood cultures tomorrow morning  - Stop zosyn  - Start Meropenem 500mg q12  - Follow Creat to adjust ABx  - Trend WBC, today 20k    Thank you for courtesy of this consult.     Will follow.  Discussed with the primary team.     Natacha Kramer MD  Division of Infectious Diseases   Please call ID service at [phone] with any question.      55 minutes spent on total encounter assessing patient, examination, chart reivew, counseling and coordinating care by the attending physician/nurse/care manager.  
  Patient is a 67y Male whom presented to the hospital with ckd and aaron     PAST MEDICAL & SURGICAL HISTORY:  Gout    Enlarged prostate    No significant past surgical history        MEDICATIONS  (STANDING):  acetaminophen   IVPB .. 1000 milliGRAM(s) IV Intermittent once  piperacillin/tazobactam IVPB.. 3.375 Gram(s) IV Intermittent every 8 hours  sodium chloride 0.9%. 1000 milliLiter(s) (75 mL/Hr) IV Continuous <Continuous>  tamsulosin 0.8 milliGRAM(s) Oral once      Allergies    No Known Allergies    Intolerances        SOCIAL HISTORY:  Denies ETOh,Smoking,     FAMILY HISTORY:      REVIEW OF SYSTEMS:    CONSTITUTIONAL: No weakness, fevers or chills  RESPIRATORY: No cough, wheezing, hemoptysis; No shortness of breath  CARDIOVASCULAR: No chest pain or palpitations  GASTROINTESTINAL: pos  abdominal ,  epigastric pain. No nausea, vomiting,     No diarrhea or constipation. No melena   GENITOURINARY: No dysuria, frequency or hematuria  NEUROLOGICAL: No numbness or weakness  SKIN: dry      VITAL:  T(C): , Max: 37.9 (22 @ 13:41)  T(F): , Max: 100.2 (22 @ 13:41)  HR: 88 (22 @ 19:09)  BP: 106/59 (22 @ 19:09)  BP(mean): --  RR: 18 (22 @ 19:09)  SpO2: 95% (22 @ 19:09)  Wt(kg): --    I and O's:    Height (cm): 175.3 ( @ 11:53), 175.3 ( @ 03:52)  Weight (kg): 79.4 ( @ 11:53), 79.4 ( @ 03:52)  BMI (kg/m2): 25.8 ( 11:53), 25.8 ( @ :52)  BSA (m2): 1.95 ( @ 11:53), 1.95 ( 03:52)    PHYSICAL EXAM:    Constitutional: NAD  HEENT: conjunctive   clear   Neck:  No JVD  Respiratory: CTAB  Cardiovascular: S1 and S2  Gastrointestinal: BS+, soft, positive Tender /ND  Extremities: No peripheral edema    LABS:                        13.7   7.75  )-----------( 245      ( 2022 13:04 )             42.2         138  |  107  |  30<H>  ----------------------------<  119<H>  4.1   |  21<L>  |  2.50<H>    Ca    9.8      2022 13:04    TPro  7.4  /  Alb  3.3  /  TBili  1.9<H>  /  DBili  x   /  AST  22  /  ALT  30  /  AlkPhos  102        Urine Studies:  Urinalysis Basic - ( 2022 13:12 )    Color: Yellow / Appearance: Slightly Turbid / S.015 / pH: x  Gluc: x / Ketone: Negative  / Bili: Negative / Urobili: Negative   Blood: x / Protein: 100 / Nitrite: Negative   Leuk Esterase: Moderate / RBC: 6-10 /HPF / WBC 11-25   Sq Epi: x / Non Sq Epi: Negative / Bacteria: Occasional            RADIOLOGY & ADDITIONAL STUDIES:        MEDICATIONS  (STANDING):  acetaminophen   IVPB .. 1000 milliGRAM(s) IV Intermittent once  piperacillin/tazobactam IVPB.. 3.375 Gram(s) IV Intermittent every 8 hours  sodium chloride 0.9%. 1000 milliLiter(s) (75 mL/Hr) IV Continuous <Continuous>  tamsulosin 0.8 milliGRAM(s) Oral once          
    FLOYD GONSALVES    PLV APER 03    Allergies    No Known Allergies    Intolerances        PAST MEDICAL & SURGICAL HISTORY:  Gout    Enlarged prostate    No significant past surgical history        FAMILY HISTORY:      Home Medications:      MEDICATIONS  (STANDING):  acetaminophen   IVPB .. 1000 milliGRAM(s) IV Intermittent once  piperacillin/tazobactam IVPB.. 3.375 Gram(s) IV Intermittent every 8 hours  sodium chloride 0.9%. 1000 milliLiter(s) (75 mL/Hr) IV Continuous <Continuous>  tamsulosin 0.8 milliGRAM(s) Oral once    MEDICATIONS  (PRN):  HYDROmorphone  Injectable 0.5 milliGRAM(s) IV Push every 4 hours PRN Moderate Pain (4 - 6)  ondansetron Injectable 4 milliGRAM(s) IV Push every 6 hours PRN Nausea and/or Vomiting      Diet, NPO:   Except Medications (22 @ 16:46) [Active]          Vital Signs Last 24 Hrs  T(C): 37.9 (2022 13:41), Max: 37.9 (2022 13:41)  T(F): 100.2 (2022 13:41), Max: 100.2 (2022 13:41)  HR: 99 (2022 13:41) (82 - 147)  BP: 114/74 (2022 13:41) (107/68 - 160/90)  BP(mean): --  RR: 18 (2022 13:41) (15 - 22)  SpO2: 99% (2022 13:41) (96% - 99%)              LABS:                        13.7   7.75  )-----------( 245      ( 2022 13:04 )             42.2         138  |  107  |  30<H>  ----------------------------<  119<H>  4.1   |  21<L>  |  2.50<H>    Ca    9.8      2022 13:04    TPro  7.4  /  Alb  3.3  /  TBili  1.9<H>  /  DBili  x   /  AST  22  /  ALT  30  /  AlkPhos  102      PT/INR - ( 2022 13:04 )   PT: 14.0 sec;   INR: 1.19 ratio         PTT - ( 2022 13:04 )  PTT:26.7 sec  Urinalysis Basic - ( 2022 13:12 )    Color: Yellow / Appearance: Slightly Turbid / S.015 / pH: x  Gluc: x / Ketone: Negative  / Bili: Negative / Urobili: Negative   Blood: x / Protein: 100 / Nitrite: Negative   Leuk Esterase: Moderate / RBC: 6-10 /HPF / WBC 11-25   Sq Epi: x / Non Sq Epi: Negative / Bacteria: Occasional            WBC:  WBC Count: 7.75 K/uL ( @ 13:04)      MICROBIOLOGY:  RECENT CULTURES:              PT/INR - ( 2022 13:04 )   PT: 14.0 sec;   INR: 1.19 ratio         PTT - ( 2022 13:04 )  PTT:26.7 sec    Sodium:  Sodium, Serum: 138 mmol/L ( @ 13:04)      2.50 mg/dL  @ 13:04      Hemoglobin:  Hemoglobin: 13.7 g/dL ( @ 13:04)      Platelets: Platelet Count - Automated: 245 K/uL ( @ 13:04)      LIVER FUNCTIONS - ( 2022 13:04 )  Alb: 3.3 g/dL / Pro: 7.4 g/dL / ALK PHOS: 102 U/L / ALT: 30 U/L / AST: 22 U/L / GGT: x             Urinalysis Basic - ( 2022 13:12 )    Color: Yellow / Appearance: Slightly Turbid / S.015 / pH: x  Gluc: x / Ketone: Negative  / Bili: Negative / Urobili: Negative   Blood: x / Protein: 100 / Nitrite: Negative   Leuk Esterase: Moderate / RBC: 6-10 /HPF / WBC 11-25   Sq Epi: x / Non Sq Epi: Negative / Bacteria: Occasional        RADIOLOGY & ADDITIONAL STUDIES:      MICROBIOLOGY:  RECENT CULTURES:

## 2022-04-29 NOTE — PROGRESS NOTE ADULT - SUBJECTIVE AND OBJECTIVE BOX
Patient is a 67y Male whom presented to the hospital with ckd and aaron     PAST MEDICAL & SURGICAL HISTORY:  Gout    Enlarged prostate    No significant past surgical history        MEDICATIONS  (STANDING):  acetaminophen   IVPB .. 1000 milliGRAM(s) IV Intermittent once  piperacillin/tazobactam IVPB.. 3.375 Gram(s) IV Intermittent every 8 hours  sodium chloride 0.9%. 1000 milliLiter(s) (75 mL/Hr) IV Continuous <Continuous>  tamsulosin 0.8 milliGRAM(s) Oral once      Allergies    No Known Allergies    Intolerances        SOCIAL HISTORY:  Denies ETOh,Smoking,     FAMILY HISTORY:      REVIEW OF SYSTEMS:    CONSTITUTIONAL: No weakness, fevers or chills  RESPIRATORY: No cough, wheezing, hemoptysis; No shortness of breath  CARDIOVASCULAR: No chest pain or palpitations  GASTROINTESTINAL: pos  abdominal ,  epigastric pain. No nausea, vomiting,     No diarrhea or constipation. No melena   GENITOURINARY: No dysuria, frequency or hematuria  NEUROLOGICAL: No numbness or weakness  SKIN: dry                          12.1   20.26 )-----------( 188      ( 2022 04:36 )             37.8       CBC Full  -  ( 2022 04:36 )  WBC Count : 20.26 K/uL  RBC Count : 4.24 M/uL  Hemoglobin : 12.1 g/dL  Hematocrit : 37.8 %  Platelet Count - Automated : 188 K/uL  Mean Cell Volume : 89.2 fl  Mean Cell Hemoglobin : 28.5 pg  Mean Cell Hemoglobin Concentration : 32.0 gm/dL  Auto Neutrophil # : x  Auto Lymphocyte # : x  Auto Monocyte # : x  Auto Eosinophil # : x  Auto Basophil # : x  Auto Neutrophil % : x  Auto Lymphocyte % : x  Auto Monocyte % : x  Auto Eosinophil % : x  Auto Basophil % : x          138  |  107  |  35<H>  ----------------------------<  126<H>  4.4   |  23  |  2.70<H>    Ca    8.7      2022 04:36    TPro  6.1  /  Alb  2.6<L>  /  TBili  1.0  /  DBili  x   /  AST  21  /  ALT  30  /  AlkPhos  85        CAPILLARY BLOOD GLUCOSE          Vital Signs Last 24 Hrs  T(C): 36.4 (2022 05:22), Max: 37.4 (2022 19:09)  T(F): 97.5 (2022 05:22), Max: 99.4 (2022 19:09)  HR: 63 (2022 05:22) (63 - 104)  BP: 124/74 (2022 05:22) (106/59 - 161/86)  BP(mean): --  RR: 18 (2022 05:22) (14 - 18)  SpO2: 95% (2022 05:22) (94% - 99%)    Urinalysis Basic - ( 2022 13:12 )    Color: Yellow / Appearance: Slightly Turbid / S.015 / pH: x  Gluc: x / Ketone: Negative  / Bili: Negative / Urobili: Negative   Blood: x / Protein: 100 / Nitrite: Negative   Leuk Esterase: Moderate / RBC: 6-10 /HPF / WBC 11-25   Sq Epi: x / Non Sq Epi: Negative / Bacteria: Occasional        PT/INR - ( 2022 13:04 )   PT: 14.0 sec;   INR: 1.19 ratio         PTT - ( 2022 13:04 )  PTT:26.7 sec          PHYSICAL EXAM:    Constitutional: NAD  HEENT: conjunctive   clear   Neck:  No JVD  Respiratory: CTAB  Cardiovascular: S1 and S2  Gastrointestinal: BS+, soft, positive Tender /ND  Extremities: No peripheral edema          MEDICATIONS  (STANDING):  acetaminophen   IVPB .. 1000 milliGRAM(s) IV Intermittent once  piperacillin/tazobactam IVPB.. 3.375 Gram(s) IV Intermittent every 8 hours  sodium chloride 0.9%. 1000 milliLiter(s) (75 mL/Hr) IV Continuous <Continuous>  tamsulosin 0.8 milliGRAM(s) Oral once

## 2022-04-29 NOTE — CONSULT NOTE ADULT - ASSESSMENT
Urinary retention from markedly enlarged prostate.  CT findings c/w chronic retention.  Recommend more frequent catheterizations  Pt to have indwelling castillo while in the hospital    Discussed with pt and Dr. Mj Arellano (surgeon)
  66 YO Male w/ PMHx of dry mouth, BPH (self-catheterizes), ?renal failure p/w abdominal pain x 1 day. Patient seen in ED last night but apparently refused further work-up. Patient returned to ED today c/o RLQ pain radiating to the groin, N/V, chills and subjective fever since last night. Admits to diarrhea a few days ago, which has resolved. Patient has not eaten since yesterday. Denies chest pain, SOB, hematochezia, hematemesis, constipation, past surgical history. Patient had a colonoscopy >10 yrs ago, but cannot recall the results. Of note, patient recently started seeing Dr. Montes and was last seen 2 weeks ago where he was referred to a nephrologist due to concern for renal failure and need for dialysis, but patient could not recall exactly who he saw and where he went.  (28 Apr 2022 16:31)      ckd stage 4 annd ACUTE RENAL FAILURE: sodium chloride 0.9%. 1000 milliLiter(s) (75 mL/Hr) IV Continuous  Serum creatinine is  at  2.5   , approximating GFR at   ml/min.   There is no progression . No uremic symptoms  No evidence of anemia .  Fluid status stable.  Will continue to avoid nephrotoxic drugs.  Patient remains asymptomatic.   Continue current therapy.  hold  diuretic.  hold   ACE inhibitor.  hold   ARB.  Additional evaluation:   ECG,    echocardiogram,     CXR,  will obtained recent   renal ultrasound to evalaute kidney size and possible stones ,if cr is not improving     ,send blood and urine cx,serial lactate levels,monitor vitals closley,ivfs hydration,monitor urine output and renal profile,iv abx     BP monitoring,continue current antihypertensive meds, low salt diet,followup with PMD in 1-2 weeks

## 2022-04-29 NOTE — PROGRESS NOTE ADULT - SUBJECTIVE AND OBJECTIVE BOX
SUBJECTIVE:  Patient seen and examined at bedside. POD# 1 s/p lap appendectomy for perforated appendicitis. No overnight events. Patient reports no pain post-op. Complaining of "dry throat" which has been chronic (follows outpatient). Also states that he would like go home today. Admits to flatus and BM. Voiding, ambulating and tolerating diet.  Patient denies any fever, chills, chest pain, shortness of breath, nausea, vomiting, or urinary complaints.    VITALS  Vital Signs Last 24 Hrs  T(C): 36.4 (29 Apr 2022 05:22), Max: 37.9 (28 Apr 2022 13:41)  T(F): 97.5 (29 Apr 2022 05:22), Max: 100.2 (28 Apr 2022 13:41)  HR: 63 (29 Apr 2022 05:22) (63 - 147)  BP: 124/74 (29 Apr 2022 05:22) (104/72 - 161/86)  BP(mean): --  RR: 18 (29 Apr 2022 05:22) (14 - 22)  SpO2: 95% (29 Apr 2022 05:22) (94% - 99%)    PHYSICAL EXAM  GENERAL:  Well-nourished, well-developed Male lying comfortably in bed in NAD. Gavin catheter in place.  CARDIO:  Regular rate and rhythm.  No murmur, gallop or rub appreciated.  RESPIRATORY:  Clear to auscultation bilaterally.  No wheezing, rales or rhonchi appreciated.  ABDOMEN:  Soft, nondistended, nontender, McBurney's point nontender. Incisional sites noted and well-approximated. Josué drain noted with serosanguinous fluid; BS appreciated on auscultation. No rebound tenderness or guarding.  EXTREMITIES: No calf tenderness bilaterally  SKIN:  No jaundice, pallor, or cyanosis  NEURO:  A&O x 3    INTAKE & OUTPUT  I&O's Summary    28 Apr 2022 07:01  -  29 Apr 2022 07:00  --------------------------------------------------------  IN: 315 mL / OUT: 280 mL / NET: 35 mL    I&O's Detail    28 Apr 2022 07:01  -  29 Apr 2022 07:00  --------------------------------------------------------  IN:    Lactated Ringers: 75 mL    Oral Fluid: 240 mL  Total IN: 315 mL    OUT:    Bulb (mL): 30 mL    Indwelling Catheter - Urethral (mL): 250 mL  Total OUT: 280 mL    Total NET: 35 mL    MEDICATIONS  MEDICATIONS  (STANDING):  enoxaparin Injectable 40 milliGRAM(s) SubCutaneous every 24 hours  pantoprazole  Injectable 40 milliGRAM(s) IV Push daily  piperacillin/tazobactam IVPB.. 3.375 Gram(s) IV Intermittent every 8 hours  senna 2 Tablet(s) Oral at bedtime  sodium chloride 0.9%. 1000 milliLiter(s) (100 mL/Hr) IV Continuous <Continuous>  tamsulosin 0.8 milliGRAM(s) Oral once    MEDICATIONS  (PRN):  acetaminophen     Tablet .. 650 milliGRAM(s) Oral every 6 hours PRN Temp greater or equal to 38C (100.4F), Mild Pain (1 - 3)  HYDROmorphone  Injectable 0.5 milliGRAM(s) IV Push every 3 hours PRN Severe Pain (7 - 10)  melatonin 5 milliGRAM(s) Oral at bedtime PRN Insomnia  oxyCODONE    IR 5 milliGRAM(s) Oral every 4 hours PRN Moderate Pain (4 - 6)    LABS:                      12.1   20.26 )-----------( 188      ( 29 Apr 2022 04:36 )             37.8     04-29    138  |  107  |  35<H>  ----------------------------<  126<H>  4.4   |  23  |  2.70<H>    Ca    8.7      29 Apr 2022 04:36    TPro  6.1  /  Alb  2.6<L>  /  TBili  1.0  /  DBili  x   /  AST  21  /  ALT  30  /  AlkPhos  85  04-29    PT/INR - ( 28 Apr 2022 13:04 )   PT: 14.0 sec;   INR: 1.19 ratio      PTT - ( 28 Apr 2022 13:04 )  PTT:26.7 sec    Culture - Blood (collected 28 Apr 2022 18:00)  Source: .Blood Blood-Peripheral  Gram Stain (29 Apr 2022 06:41):    Growth in aerobic bottle: Gram Variable Rods  Preliminary Report (29 Apr 2022 06:42):    Growth in aerobic bottle: Gram Variable Rods    ***Blood Panel PCR results on this specimen are available    approximately 3 hours after the Gram stain result.***    Gram stain, PCR, and/or culture results may not always    correspond due to difference in methodologies.    ************************************************************    This PCR assay was performed by multiplex PCR. This    Assay tests for 66 bacterial and resistance gene targets.    Please refer to the [facility] Labs test directory    at [email or web address] for details.    ASSESSMENT & PLAN   67y Male POD# 1 s/p lap appendectomy for perforated appendicitis. Noted today to have gram variable rods on blood culture.    - Blood cultures reviewed. Will order repeat set.  - Continue IV Zosyn  - Continue clear liquid diet  - Serial abdominal exams  - Is & Os  - DVT prophylaxis with Lovenox  - OOB, pain control  - Incentive spirometry  - Follow up AM labs  - Case to be discussed with Dr. Arellano

## 2022-04-29 NOTE — PROGRESS NOTE ADULT - SUBJECTIVE AND OBJECTIVE BOX
FLOYD GONSALVES    PLV 2EAS 217 W1    Allergies    No Known Allergies    Intolerances        PAST MEDICAL & SURGICAL HISTORY:  Gout    Enlarged prostate    No significant past surgical history        FAMILY HISTORY:      Home Medications:      MEDICATIONS  (STANDING):  enoxaparin Injectable 40 milliGRAM(s) SubCutaneous every 24 hours  pantoprazole  Injectable 40 milliGRAM(s) IV Push daily  piperacillin/tazobactam IVPB.. 3.375 Gram(s) IV Intermittent every 8 hours  senna 2 Tablet(s) Oral at bedtime  sodium chloride 0.9%. 1000 milliLiter(s) (100 mL/Hr) IV Continuous <Continuous>  tamsulosin 0.8 milliGRAM(s) Oral once    MEDICATIONS  (PRN):  acetaminophen     Tablet .. 650 milliGRAM(s) Oral every 6 hours PRN Temp greater or equal to 38C (100.4F), Mild Pain (1 - 3)  HYDROmorphone  Injectable 0.5 milliGRAM(s) IV Push every 3 hours PRN Severe Pain (7 - 10)  melatonin 5 milliGRAM(s) Oral at bedtime PRN Insomnia  oxyCODONE    IR 5 milliGRAM(s) Oral every 4 hours PRN Moderate Pain (4 - 6)      Diet, Clear Liquid (22 @ 23:16) [Active]          Vital Signs Last 24 Hrs  T(C): 36.4 (2022 05:22), Max: 37.9 (2022 13:41)  T(F): 97.5 (2022 05:22), Max: 100.2 (2022 13:41)  HR: 63 (2022 05:22) (63 - 147)  BP: 124/74 (2022 05:22) (104/72 - 161/86)  BP(mean): --  RR: 18 (2022 05:22) (14 - 22)  SpO2: 95% (2022 05:22) (94% - 99%)      22 @ 07:01  -  22 @ 07:00  --------------------------------------------------------  IN: 315 mL / OUT: 280 mL / NET: 35 mL              LABS:                        12.1   20.26 )-----------( 188      ( 2022 04:36 )             37.8         138  |  107  |  35<H>  ----------------------------<  126<H>  4.4   |  23  |  2.70<H>    Ca    8.7      2022 04:36    TPro  6.1  /  Alb  2.6<L>  /  TBili  1.0  /  DBili  x   /  AST  21  /  ALT  30  /  AlkPhos  85      PT/INR - ( 2022 13:04 )   PT: 14.0 sec;   INR: 1.19 ratio         PTT - ( 2022 13:04 )  PTT:26.7 sec  Urinalysis Basic - ( 2022 13:12 )    Color: Yellow / Appearance: Slightly Turbid / S.015 / pH: x  Gluc: x / Ketone: Negative  / Bili: Negative / Urobili: Negative   Blood: x / Protein: 100 / Nitrite: Negative   Leuk Esterase: Moderate / RBC: 6-10 /HPF / WBC 11-25   Sq Epi: x / Non Sq Epi: Negative / Bacteria: Occasional            WBC:  WBC Count: 20.26 K/uL ( @ 04:36)  WBC Count: 7.75 K/uL ( @ 13:04)      MICROBIOLOGY:  RECENT CULTURES:   .Blood Blood-Peripheral XXXX   Growth in aerobic bottle: Gram Variable Rods   Growth in aerobic bottle: Gram Variable Rods  ***Blood Panel PCR results on this specimen are available  approximately 3 hours after the Gram stain result.***  Gram stain, PCR, and/or culture results may not always  correspond due to difference in methodologies.  ************************************************************  This PCR assay was performed by multiplex PCR. This  Assay tests for 66 bacterial and resistance gene targets.  Please refer to the [facility] Labs test directory  at [email or web address] for details.                PT/INR - ( 2022 13:04 )   PT: 14.0 sec;   INR: 1.19 ratio         PTT - ( 2022 13:04 )  PTT:26.7 sec    Sodium:  Sodium, Serum: 138 mmol/L ( @ 04:36)  Sodium, Serum: 138 mmol/L ( @ 13:04)      2.70 mg/dL  @ 04:36  2.50 mg/dL  @ 13:04      Hemoglobin:  Hemoglobin: 12.1 g/dL ( @ 04:36)  Hemoglobin: 13.7 g/dL ( @ 13:04)      Platelets: Platelet Count - Automated: 188 K/uL ( @ 04:36)  Platelet Count - Automated: 245 K/uL ( @ 13:04)      LIVER FUNCTIONS - ( 2022 04:36 )  Alb: 2.6 g/dL / Pro: 6.1 g/dL / ALK PHOS: 85 U/L / ALT: 30 U/L / AST: 21 U/L / GGT: x             Urinalysis Basic - ( 2022 13:12 )    Color: Yellow / Appearance: Slightly Turbid / S.015 / pH: x  Gluc: x / Ketone: Negative  / Bili: Negative / Urobili: Negative   Blood: x / Protein: 100 / Nitrite: Negative   Leuk Esterase: Moderate / RBC: 6-10 /HPF / WBC 11-25   Sq Epi: x / Non Sq Epi: Negative / Bacteria: Occasional        RADIOLOGY & ADDITIONAL STUDIES:      MICROBIOLOGY:  RECENT CULTURES:   .Blood Blood-Peripheral XXXX   Growth in aerobic bottle: Gram Variable Rods   Growth in aerobic bottle: Gram Variable Rods  ***Blood Panel PCR results on this specimen are available  approximately 3 hours after the Gram stain result.***  Gram stain, PCR, and/or culture results may not always  correspond due to difference in methodologies.  ************************************************************  This PCR assay was performed by multiplex PCR. This  Assay tests for 66 bacterial and resistance gene targets.  Please refer to the [facility] Labs test directory  at [email or web address] for details.

## 2022-04-30 LAB
ANION GAP SERPL CALC-SCNC: 8 MMOL/L — SIGNIFICANT CHANGE UP (ref 5–17)
BASOPHILS # BLD AUTO: 0.03 K/UL — SIGNIFICANT CHANGE UP (ref 0–0.2)
BASOPHILS NFR BLD AUTO: 0.2 % — SIGNIFICANT CHANGE UP (ref 0–2)
BUN SERPL-MCNC: 44 MG/DL — HIGH (ref 7–23)
CALCIUM SERPL-MCNC: 9.4 MG/DL — SIGNIFICANT CHANGE UP (ref 8.5–10.1)
CHLORIDE SERPL-SCNC: 111 MMOL/L — HIGH (ref 96–108)
CO2 SERPL-SCNC: 23 MMOL/L — SIGNIFICANT CHANGE UP (ref 22–31)
CREAT SERPL-MCNC: 2.7 MG/DL — HIGH (ref 0.5–1.3)
EGFR: 25 ML/MIN/1.73M2 — LOW
EOSINOPHIL # BLD AUTO: 0.17 K/UL — SIGNIFICANT CHANGE UP (ref 0–0.5)
EOSINOPHIL NFR BLD AUTO: 1.1 % — SIGNIFICANT CHANGE UP (ref 0–6)
GLUCOSE SERPL-MCNC: 77 MG/DL — SIGNIFICANT CHANGE UP (ref 70–99)
HCT VFR BLD CALC: 38.1 % — LOW (ref 39–50)
HGB BLD-MCNC: 12.6 G/DL — LOW (ref 13–17)
IMM GRANULOCYTES NFR BLD AUTO: 0.6 % — SIGNIFICANT CHANGE UP (ref 0–1.5)
LYMPHOCYTES # BLD AUTO: 0.81 K/UL — LOW (ref 1–3.3)
LYMPHOCYTES # BLD AUTO: 5.3 % — LOW (ref 13–44)
MCHC RBC-ENTMCNC: 28.6 PG — SIGNIFICANT CHANGE UP (ref 27–34)
MCHC RBC-ENTMCNC: 33.1 GM/DL — SIGNIFICANT CHANGE UP (ref 32–36)
MCV RBC AUTO: 86.6 FL — SIGNIFICANT CHANGE UP (ref 80–100)
MONOCYTES # BLD AUTO: 0.7 K/UL — SIGNIFICANT CHANGE UP (ref 0–0.9)
MONOCYTES NFR BLD AUTO: 4.6 % — SIGNIFICANT CHANGE UP (ref 2–14)
NEUTROPHILS # BLD AUTO: 13.54 K/UL — HIGH (ref 1.8–7.4)
NEUTROPHILS NFR BLD AUTO: 88.2 % — HIGH (ref 43–77)
NRBC # BLD: 0 /100 WBCS — SIGNIFICANT CHANGE UP (ref 0–0)
PLATELET # BLD AUTO: 209 K/UL — SIGNIFICANT CHANGE UP (ref 150–400)
POTASSIUM SERPL-MCNC: 3.8 MMOL/L — SIGNIFICANT CHANGE UP (ref 3.5–5.3)
POTASSIUM SERPL-SCNC: 3.8 MMOL/L — SIGNIFICANT CHANGE UP (ref 3.5–5.3)
RBC # BLD: 4.4 M/UL — SIGNIFICANT CHANGE UP (ref 4.2–5.8)
RBC # FLD: 15.2 % — HIGH (ref 10.3–14.5)
SODIUM SERPL-SCNC: 142 MMOL/L — SIGNIFICANT CHANGE UP (ref 135–145)
WBC # BLD: 15.34 K/UL — HIGH (ref 3.8–10.5)
WBC # FLD AUTO: 15.34 K/UL — HIGH (ref 3.8–10.5)

## 2022-04-30 PROCEDURE — 99233 SBSQ HOSP IP/OBS HIGH 50: CPT

## 2022-04-30 RX ORDER — SODIUM CHLORIDE 9 MG/ML
1000 INJECTION, SOLUTION INTRAVENOUS
Refills: 0 | Status: DISCONTINUED | OUTPATIENT
Start: 2022-04-30 | End: 2022-05-03

## 2022-04-30 RX ADMIN — SODIUM CHLORIDE 75 MILLILITER(S): 9 INJECTION, SOLUTION INTRAVENOUS at 11:35

## 2022-04-30 RX ADMIN — PANTOPRAZOLE SODIUM 40 MILLIGRAM(S): 20 TABLET, DELAYED RELEASE ORAL at 11:28

## 2022-04-30 RX ADMIN — MEROPENEM 100 MILLIGRAM(S): 1 INJECTION INTRAVENOUS at 05:43

## 2022-04-30 RX ADMIN — MEROPENEM 100 MILLIGRAM(S): 1 INJECTION INTRAVENOUS at 17:11

## 2022-04-30 RX ADMIN — SENNA PLUS 2 TABLET(S): 8.6 TABLET ORAL at 22:19

## 2022-04-30 RX ADMIN — ENOXAPARIN SODIUM 40 MILLIGRAM(S): 100 INJECTION SUBCUTANEOUS at 17:12

## 2022-04-30 NOTE — PROGRESS NOTE ADULT - SUBJECTIVE AND OBJECTIVE BOX
INTERVAL Hx:  s/p lap appy.  Doing well.  Self cath's at home for urinary retention.  Improving leukocytosis.  (+)GNR in blood and urine Cultures.    MEDICATIONS  (STANDING):  enoxaparin Injectable 40 milliGRAM(s) SubCutaneous every 24 hours  meropenem  IVPB 500 milliGRAM(s) IV Intermittent every 12 hours  pantoprazole  Injectable 40 milliGRAM(s) IV Push daily  senna 2 Tablet(s) Oral at bedtime  tamsulosin 0.8 milliGRAM(s) Oral once    MEDICATIONS  (PRN):  acetaminophen     Tablet .. 650 milliGRAM(s) Oral every 6 hours PRN Temp greater or equal to 38C (100.4F), Mild Pain (1 - 3)  HYDROmorphone  Injectable 0.5 milliGRAM(s) IV Push every 3 hours PRN Severe Pain (7 - 10)  melatonin 5 milliGRAM(s) Oral at bedtime PRN Insomnia  oxyCODONE    IR 5 milliGRAM(s) Oral every 4 hours PRN Moderate Pain (4 - 6)        Vital Signs Last 24 Hrs  T(C): 36.7 (30 Apr 2022 05:14), Max: 36.8 (29 Apr 2022 20:16)  T(F): 98.1 (30 Apr 2022 05:14), Max: 98.3 (29 Apr 2022 20:16)  HR: 78 (30 Apr 2022 05:14) (69 - 78)  BP: 163/89 (30 Apr 2022 05:14) (134/75 - 163/89)  BP(mean): --  RR: 19 (30 Apr 2022 05:14) (19 - 20)  SpO2: 96% (30 Apr 2022 05:14) (91% - 96%)    PHYSICAL EXAM:      Gavin: draining clear, colorless urine    LABS:                        12.6   15.34 )-----------( 209      ( 30 Apr 2022 06:34 )             38.1     04-30    142  |  111<H>  |  44<H>  ----------------------------<  77  3.8   |  23  |  2.70<H>    Ca    9.4      30 Apr 2022 06:34    TPro  6.1  /  Alb  2.6<L>  /  TBili  1.0  /  DBili  x   /  AST  21  /  ALT  30  /  AlkPhos  85  04-29    Urine culture:  04-28 @ 18:00 --   Growth in aerobic bottle: Gram Variable Rods  Growth in anaerobic bottle: Gram Negative Rods  ***Blood Panel PCR results on this specimen are available  approximately 3 hours after the Gram stain result.***  Gram stain, PCR, and/or culture results may not always  correspond due to difference in methodologies.  ************************************************************  This PCR assay was performed by multiplex PCR. This  Assay tests for 66 bacterial and resistance gene targets.  Please refer to the [facility] Labs test directory  at [email or web address] for details.

## 2022-04-30 NOTE — PROGRESS NOTE ADULT - SUBJECTIVE AND OBJECTIVE BOX
FLOYD BRINA    PLV 2EAS 217 W1    Allergies    No Known Allergies    Intolerances        PAST MEDICAL & SURGICAL HISTORY:  Gout    Enlarged prostate    No significant past surgical history        FAMILY HISTORY:      Home Medications:      MEDICATIONS  (STANDING):  enoxaparin Injectable 40 milliGRAM(s) SubCutaneous every 24 hours  meropenem  IVPB 500 milliGRAM(s) IV Intermittent every 12 hours  pantoprazole  Injectable 40 milliGRAM(s) IV Push daily  senna 2 Tablet(s) Oral at bedtime  tamsulosin 0.8 milliGRAM(s) Oral once    MEDICATIONS  (PRN):  acetaminophen     Tablet .. 650 milliGRAM(s) Oral every 6 hours PRN Temp greater or equal to 38C (100.4F), Mild Pain (1 - 3)  HYDROmorphone  Injectable 0.5 milliGRAM(s) IV Push every 3 hours PRN Severe Pain (7 - 10)  melatonin 5 milliGRAM(s) Oral at bedtime PRN Insomnia  oxyCODONE    IR 5 milliGRAM(s) Oral every 4 hours PRN Moderate Pain (4 - 6)      Diet, Regular (22 @ 13:07) [Active]          Vital Signs Last 24 Hrs  T(C): 36.7 (2022 05:14), Max: 36.8 (2022 20:16)  T(F): 98.1 (2022 05:14), Max: 98.3 (2022 20:16)  HR: 78 (2022 05:14) (69 - 78)  BP: 163/89 (2022 05:14) (134/75 - 163/89)  BP(mean): --  RR: 19 (2022 05:14) (19 - 20)  SpO2: 96% (2022 05:14) (91% - 96%)      22 @ 07:01  -  22 @ 07:00  --------------------------------------------------------  IN: 0 mL / OUT: 5650 mL / NET: -5650 mL              LABS:                        12.6   15.34 )-----------( 209      ( 2022 06:34 )             38.1         142  |  111<H>  |  44<H>  ----------------------------<  77  3.8   |  23  |  2.70<H>    Ca    9.4      2022 06:34    TPro  6.1  /  Alb  2.6<L>  /  TBili  1.0  /  DBili  x   /  AST  21  /  ALT  30  /  AlkPhos  85      PT/INR - ( 2022 13:04 )   PT: 14.0 sec;   INR: 1.19 ratio         PTT - ( 2022 13:04 )  PTT:26.7 sec  Urinalysis Basic - ( 2022 13:12 )    Color: Yellow / Appearance: Slightly Turbid / S.015 / pH: x  Gluc: x / Ketone: Negative  / Bili: Negative / Urobili: Negative   Blood: x / Protein: 100 / Nitrite: Negative   Leuk Esterase: Moderate / RBC: 6-10 /HPF / WBC 11-25   Sq Epi: x / Non Sq Epi: Negative / Bacteria: Occasional            WBC:  WBC Count: 15.34 K/uL ( @ 06:34)  WBC Count: 20.26 K/uL ( @ 04:36)  WBC Count: 7.75 K/uL ( @ 13:04)      MICROBIOLOGY:  RECENT CULTURES:   .Blood Blood-Peripheral Blood Culture PCR   Growth in aerobic bottle: Gram Variable Rods  Growth in anaerobic bottle: Gram Negative Rods   Growth in aerobic bottle: Gram Variable Rods  Growth in anaerobic bottle: Gram Negative Rods  ***Blood Panel PCR results on this specimen are available  approximately 3 hours after the Gram stain result.***  Gram stain, PCR, and/or culture results may not always  correspond due to difference in methodologies.  ************************************************************  This PCR assay was performed by multiplex PCR. This  Assay tests for 66 bacterial and resistance gene targets.  Please refer to the [facility] Labs test directory  at [email or web address] for details.                PT/INR - ( 2022 13:04 )   PT: 14.0 sec;   INR: 1.19 ratio         PTT - ( 2022 13:04 )  PTT:26.7 sec    Sodium:  Sodium, Serum: 142 mmol/L ( @ 06:34)  Sodium, Serum: 138 mmol/L ( @ 04:36)  Sodium, Serum: 138 mmol/L ( @ 13:04)      2.70 mg/dL  @ 06:34  2.70 mg/dL  @ 04:36  2.50 mg/dL  @ 13:04      Hemoglobin:  Hemoglobin: 12.6 g/dL ( @ 06:34)  Hemoglobin: 12.1 g/dL ( @ 04:36)  Hemoglobin: 13.7 g/dL ( @ 13:04)      Platelets: Platelet Count - Automated: 209 K/uL ( @ 06:34)  Platelet Count - Automated: 188 K/uL ( @ 04:36)  Platelet Count - Automated: 245 K/uL ( @ 13:04)      LIVER FUNCTIONS - ( 2022 04:36 )  Alb: 2.6 g/dL / Pro: 6.1 g/dL / ALK PHOS: 85 U/L / ALT: 30 U/L / AST: 21 U/L / GGT: x             Urinalysis Basic - ( 2022 13:12 )    Color: Yellow / Appearance: Slightly Turbid / S.015 / pH: x  Gluc: x / Ketone: Negative  / Bili: Negative / Urobili: Negative   Blood: x / Protein: 100 / Nitrite: Negative   Leuk Esterase: Moderate / RBC: 6-10 /HPF / WBC 11-25   Sq Epi: x / Non Sq Epi: Negative / Bacteria: Occasional        RADIOLOGY & ADDITIONAL STUDIES:      MICROBIOLOGY:  RECENT CULTURES:   .Blood Blood-Peripheral Blood Culture PCR   Growth in aerobic bottle: Gram Variable Rods  Growth in anaerobic bottle: Gram Negative Rods   Growth in aerobic bottle: Gram Variable Rods  Growth in anaerobic bottle: Gram Negative Rods  ***Blood Panel PCR results on this specimen are available  approximately 3 hours after the Gram stain result.***  Gram stain, PCR, and/or culture results may not always  correspond due to difference in methodologies.  ************************************************************  This PCR assay was performed by multiplex PCR. This  Assay tests for 66 bacterial and resistance gene targets.  Please refer to the [facility] Labs test directory  at [email or web address] for details.

## 2022-04-30 NOTE — PROGRESS NOTE ADULT - SUBJECTIVE AND OBJECTIVE BOX
[facility]  INFECTIOUS DISEASES   [address] [place] [address]  Tel: [phone]     Fax: [phone]  ======================================================  MD Jessica Alicea Kaushal, MD Cho, Michelle, MD   ======================================================    [id number]  FLOYD GONSALVES     Follow up: Bacteremia and acute appendicitis     No complaint, doing well, no fever, on regular diet tolerating well.   Still has the drain.    PAST MEDICAL & SURGICAL HISTORY:  Gout  Enlarged prostate    No significant past surgical history    Social Hx:  No smoking, ETOH or drugs     FAMILY HISTORY: No pertinent medical history     Allergies  No Known Allergies    Antibiotics:  MEDICATIONS  (STANDING):  enoxaparin Injectable 40 milliGRAM(s) SubCutaneous every 24 hours  meropenem  IVPB 500 milliGRAM(s) IV Intermittent every 12 hours  pantoprazole  Injectable 40 milliGRAM(s) IV Push daily  senna 2 Tablet(s) Oral at bedtime  sodium chloride 0.9%. 1000 milliLiter(s) (50 mL/Hr) IV Continuous <Continuous>  tamsulosin 0.8 milliGRAM(s) Oral once     REVIEW OF SYSTEMS:  CONSTITUTIONAL:  No Fever or chills  HEENT:  No diplopia or blurred vision.  No sore throat or runny nose.  CARDIOVASCULAR:  No chest pain or SOB.  RESPIRATORY:  No cough, shortness of breath, PND or orthopnea.  GASTROINTESTINAL:  No nausea, vomiting or diarrhea.  GENITOURINARY:  No dysuria, frequency or urgency. No Blood in urine  MUSCULOSKELETAL:  no joint aches, no muscle pain  SKIN:  No change in skin, hair or nails.  NEUROLOGIC:  No paresthesias, fasciculations, seizures or weakness.  PSYCHIATRIC:  No disorder of thought or mood.  ENDOCRINE:  No heat or cold intolerance, polyuria or polydipsia.  HEMATOLOGICAL:  No easy bruising or bleeding.     Physical Exam:  Vital Signs Last 24 Hrs  T(C): 36.7 (30 Apr 2022 05:14), Max: 36.8 (29 Apr 2022 20:16)  T(F): 98.1 (30 Apr 2022 05:14), Max: 98.3 (29 Apr 2022 20:16)  HR: 78 (30 Apr 2022 05:14) (69 - 78)  BP: 163/89 (30 Apr 2022 05:14) (134/75 - 163/89)  BP(mean): --  RR: 19 (30 Apr 2022 05:14) (19 - 20)  SpO2: 96% (30 Apr 2022 05:14) (91% - 96%)  GEN: NAD  HEENT: normocephalic and atraumatic. EOMI. PERRL.    NECK: Supple.  No lymphadenopathy   LUNGS: Clear to auscultation.  HEART: Regular rate and rhythm without murmur.  ABDOMEN: Soft, nontender, and nondistended.  Positive bowel sounds.    Wounds look clean, drain in place with purulent discharge  : No CVA tenderness  EXTREMITIES: Without any cyanosis, clubbing, rash, lesions or edema.  NEUROLOGIC: grossly intact.  PSYCHIATRIC: Appropriate affect .  SKIN: No ulceration or induration present.    Labs:                        12.6   15.34 )-----------( 209      ( 30 Apr 2022 06:34 )             38.1     04-30    142  |  111<H>  |  44<H>  ----------------------------<  77  3.8   |  23  |  2.70<H>    Ca    9.4      30 Apr 2022 06:34    TPro  6.1  /  Alb  2.6<L>  /  TBili  1.0  /  DBili  x   /  AST  21  /  ALT  30  /  AlkPhos  85  04-29    Culture - Blood (collected 04-28-22 @ 18:00)  Source: .Blood Blood-Peripheral  Gram Stain (04-29-22 @ 18:55):    Growth in aerobic bottle:    Gram Negative Rods    Growth in anaerobic bottle: Gram Negative Rods    Culture - Blood (collected 04-28-22 @ 18:00)  Source: .Blood Blood-Peripheral  Gram Stain (04-29-22 @ 12:17):    Growth in aerobic bottle: Gram Variable Rods    Growth in anaerobic bottle: Gram Negative Rods  Organism: Blood Culture PCR (04-29-22 @ 09:43)  Organism: Blood Culture PCR (04-29-22 @ 09:43)    Sensitivities:      -  CTX-M Resistance Marker: Detec      -  ESBL: Detec      -  Escherichia coli: Detec      Method Type: PCR    WBC Count: 15.34 K/uL (04-30-22 @ 06:34)  WBC Count: 20.26 K/uL (04-29-22 @ 04:36)  WBC Count: 7.75 K/uL (04-28-22 @ 13:04)    Creatinine, Serum: 2.70 mg/dL (04-30-22 @ 06:34)  Creatinine, Serum: 2.70 mg/dL (04-29-22 @ 04:36)  Creatinine, Serum: 2.50 mg/dL (04-28-22 @ 13:04)     COVID-19 PCR: NotDetec (04-28-22 @ 13:04)    All imaging and other data have been reviewed.  < from: CT Abdomen and Pelvis No Cont (04.28.22 @ 14:22) >  ACC: [id number] EXAM:  CT ABDOMEN AND PELVIS                        PROCEDURE DATE:  04/28/2022    INTERPRETATION:  CLINICAL INFORMATION: Right lower quadrant abdominal   pain, radiating to groin.  COMPARISON: None.  CONTRAST/COMPLICATIONS:  IV Contrast: NONE  Oral Contrast: NONE  Complications: None reported at time of study completion  PROCEDURE:  CT of the Abdomen and Pelvis was performed.  Sagittal and coronal reformats were performed.  FINDINGS:  LOWER CHEST:  Mild dependent bibasilar atelectatic changes.  The evaluation of the solid organ parenchyma is limited without   intravenous contrast.  LIVER: Within normal limits.  BILE DUCTS: Normal caliber.  GALLBLADDER: Within normal limits.  SPLEEN: Borderline enlarged.  PANCREAS: Within normal limits.  ADRENALS: Within normal limits.  KIDNEYS/URETERS:  Bilateral renal cysts and several small indeterminate hypodense right   renal lesions.  Small hyperdense lesion medial interpolar left kidney.  Atrophic right kidney.  Prominent right extrarenal pelvis.  Mild left-sided hydroureteronephrosis, without obstructing ureteral   calculus.  BLADDER: Small focus of air within the urinary bladder, correlate   clinically for recent instrumentation versus infection.  REPRODUCTIVE ORGANS: Markedly enlarged prostate gland with intravesicular   extension.  BOWEL: Small hiatal hernia.  No bowel obstruction.  Dilated appendix, measuring up to 2 cm, with 1 cm calcified appendicolith   at base and 1.4 cm calcified appendicolith within body. There is   significant periappendiceal stranding, with inflammatory stranding   extending along the right posterior flank and pelvis.  There is thickening at the cecal apex.  PERITONEUM: No ascites.  No localized intra-abdominal fluid collection or pneumoperitoneum noted.  VESSELS: Within normal limits.  RETROPERITONEUM/LYMPH NODES: No lymphadenopathy.  ABDOMINAL WALL: Small bilateral fat-containing inguinal hernias.  BONES: Within normal limits.  IMPRESSION:  CT findings compatible with acute appendicitis as discussed above.  Prostatomegaly.    Assessment and Plan:   68 yo man with PMH of BPH and CKD/GABBY was admitted with abdominal pain for one day. CT consistent with Acute appendicitis so taken to OR for Lap appendectomy on 4/28.   Today Blood culture is back positive for ESBL ecoli.   Most likely due to perforated appendicitis, even though has an enlarged Prostate but he is asymptomatic.     Bacteremia   - Blood culture with ESBL Ecoli  - Repeat blood cultures testing   - Continue Meropenem 500mg q12  - Follow Creat to adjust ABx  - Trend WBC, today 20k-->15k  - Continue castillo as per  for urinary retention     Will follow.  Discussed with the primary team.     Natacha Kramer MD  Division of Infectious Diseases   Please call ID service at [phone] with any question.      35 minutes spent on total encounter assessing patient, examination, chart reivew, counseling and coordinating care by the attending physician/nurse/care manager.

## 2022-04-30 NOTE — PROGRESS NOTE ADULT - SUBJECTIVE AND OBJECTIVE BOX
STATUS POST:  lap appy with drain    POST OPERATIVE DAY #: 2    SUBJECTIVE:  Patient seen and examined at bedside.  No overnight events.  Patient with no new complaints at this time, tolerating reg diet, denies flatus and bowel movement.  Patient denies any fevers, chills, chest pain, shortness of breath, nausea, vomiting or diarrhea.    Vital Signs Last 24 Hrs  T(C): 36.7 (2022 05:14), Max: 36.8 (2022 20:16)  T(F): 98.1 (2022 05:14), Max: 98.3 (2022 20:16)  HR: 78 (:14) (69 - 78)  BP: 163/89 (2022 05:14) (134/75 - 163/89)  BP(mean): --  RR: 19 (2022 05:14) (19 - 20)  SpO2: 96% (:14) (91% - 96%)    PHYSICAL EXAM:  GENERAL: No acute distress, well-developed  HEAD:  Atraumatic, Normocephalic  ABDOMEN: Soft, minimally-tender, non-distended; bowel sounds+. JAISON drain seropurulent   NEUROLOGY: A&O x 3, no focal deficits    I&O's Summary    2022 07:  -  2022 07:00  --------------------------------------------------------  IN: 315 mL / OUT: 280 mL / NET: 35 mL    2022 07:  -  2022 05:55  --------------------------------------------------------  IN: 0 mL / OUT: 5650 mL / NET: -5650 mL      I&O's Detail    2022 07:  -  2022 07:00  --------------------------------------------------------  IN:    Lactated Ringers: 75 mL    Oral Fluid: 240 mL  Total IN: 315 mL    OUT:    Bulb (mL): 30 mL    Indwelling Catheter - Urethral (mL): 250 mL  Total OUT: 280 mL    Total NET: 35 mL      2022 07:01  -  2022 05:55  --------------------------------------------------------  IN:  Total IN: 0 mL    OUT:    Bulb (mL): 100 mL    Indwelling Catheter - Urethral (mL): 5550 mL  Total OUT: 5650 mL    Total NET: -5650 mL        MEDICATIONS  (STANDING):  enoxaparin Injectable 40 milliGRAM(s) SubCutaneous every 24 hours  meropenem  IVPB 500 milliGRAM(s) IV Intermittent every 12 hours  pantoprazole  Injectable 40 milliGRAM(s) IV Push daily  senna 2 Tablet(s) Oral at bedtime  tamsulosin 0.8 milliGRAM(s) Oral once    MEDICATIONS  (PRN):  acetaminophen     Tablet .. 650 milliGRAM(s) Oral every 6 hours PRN Temp greater or equal to 38C (100.4F), Mild Pain (1 - 3)  HYDROmorphone  Injectable 0.5 milliGRAM(s) IV Push every 3 hours PRN Severe Pain (7 - 10)  melatonin 5 milliGRAM(s) Oral at bedtime PRN Insomnia  oxyCODONE    IR 5 milliGRAM(s) Oral every 4 hours PRN Moderate Pain (4 - 6)    LABS:                        12.1   20.26 )-----------( 188      ( 2022 04:36 )             37.8         138  |  107  |  35<H>  ----------------------------<  126<H>  4.4   |  23  |  2.70<H>    Ca    8.7      2022 04:36    TPro  6.1  /  Alb  2.6<L>  /  TBili  1.0  /  DBili  x   /  AST  21  /  ALT  30  /  AlkPhos  85  04-29    PT/INR - ( 2022 13:04 )   PT: 14.0 sec;   INR: 1.19 ratio         PTT - ( 2022 13:04 )  PTT:26.7 sec  Urinalysis Basic - ( 2022 13:12 )    Color: Yellow / Appearance: Slightly Turbid / S.015 / pH: x  Gluc: x / Ketone: Negative  / Bili: Negative / Urobili: Negative   Blood: x / Protein: 100 / Nitrite: Negative   Leuk Esterase: Moderate / RBC: 6-10 /HPF / WBC 11-25   Sq Epi: x / Non Sq Epi: Negative / Bacteria: Occasional      RADIOLOGY & ADDITIONAL STUDIES:

## 2022-05-01 LAB
-  AMIKACIN: SIGNIFICANT CHANGE UP
-  AMOXICILLIN/CLAVULANIC ACID: SIGNIFICANT CHANGE UP
-  AMPICILLIN/SULBACTAM: SIGNIFICANT CHANGE UP
-  AMPICILLIN/SULBACTAM: SIGNIFICANT CHANGE UP
-  AMPICILLIN: SIGNIFICANT CHANGE UP
-  AMPICILLIN: SIGNIFICANT CHANGE UP
-  AZTREONAM: SIGNIFICANT CHANGE UP
-  CEFAZOLIN: SIGNIFICANT CHANGE UP
-  CEFAZOLIN: SIGNIFICANT CHANGE UP
-  CEFEPIME: SIGNIFICANT CHANGE UP
-  CEFOXITIN: SIGNIFICANT CHANGE UP
-  CEFTAZIDIME: SIGNIFICANT CHANGE UP
-  CEFTRIAXONE: SIGNIFICANT CHANGE UP
-  CEFTRIAXONE: SIGNIFICANT CHANGE UP
-  CIPROFLOXACIN: SIGNIFICANT CHANGE UP
-  ERTAPENEM: SIGNIFICANT CHANGE UP
-  ERTAPENEM: SIGNIFICANT CHANGE UP
-  GENTAMICIN: SIGNIFICANT CHANGE UP
-  IMIPENEM: SIGNIFICANT CHANGE UP
-  LEVOFLOXACIN: SIGNIFICANT CHANGE UP
-  MEROPENEM: SIGNIFICANT CHANGE UP
-  NITROFURANTOIN: SIGNIFICANT CHANGE UP
-  PIPERACILLIN/TAZOBACTAM: SIGNIFICANT CHANGE UP
-  TIGECYCLINE: SIGNIFICANT CHANGE UP
-  TOBRAMYCIN: SIGNIFICANT CHANGE UP
-  TRIMETHOPRIM/SULFAMETHOXAZOLE: SIGNIFICANT CHANGE UP
-  TRIMETHOPRIM/SULFAMETHOXAZOLE: SIGNIFICANT CHANGE UP
ANION GAP SERPL CALC-SCNC: 7 MMOL/L — SIGNIFICANT CHANGE UP (ref 5–17)
BASOPHILS # BLD AUTO: 0.03 K/UL — SIGNIFICANT CHANGE UP (ref 0–0.2)
BASOPHILS NFR BLD AUTO: 0.3 % — SIGNIFICANT CHANGE UP (ref 0–2)
BUN SERPL-MCNC: 46 MG/DL — HIGH (ref 7–23)
CALCIUM SERPL-MCNC: 9.5 MG/DL — SIGNIFICANT CHANGE UP (ref 8.5–10.1)
CHLORIDE SERPL-SCNC: 107 MMOL/L — SIGNIFICANT CHANGE UP (ref 96–108)
CO2 SERPL-SCNC: 25 MMOL/L — SIGNIFICANT CHANGE UP (ref 22–31)
CREAT SERPL-MCNC: 2.4 MG/DL — HIGH (ref 0.5–1.3)
CULTURE RESULTS: SIGNIFICANT CHANGE UP
EGFR: 29 ML/MIN/1.73M2 — LOW
EOSINOPHIL # BLD AUTO: 0.29 K/UL — SIGNIFICANT CHANGE UP (ref 0–0.5)
EOSINOPHIL NFR BLD AUTO: 2.9 % — SIGNIFICANT CHANGE UP (ref 0–6)
GLUCOSE SERPL-MCNC: 67 MG/DL — LOW (ref 70–99)
HCT VFR BLD CALC: 38.8 % — LOW (ref 39–50)
HGB BLD-MCNC: 12.9 G/DL — LOW (ref 13–17)
IMM GRANULOCYTES NFR BLD AUTO: 0.7 % — SIGNIFICANT CHANGE UP (ref 0–1.5)
LYMPHOCYTES # BLD AUTO: 0.82 K/UL — LOW (ref 1–3.3)
LYMPHOCYTES # BLD AUTO: 8.3 % — LOW (ref 13–44)
MAGNESIUM SERPL-MCNC: 2.2 MG/DL — SIGNIFICANT CHANGE UP (ref 1.6–2.6)
MCHC RBC-ENTMCNC: 29 PG — SIGNIFICANT CHANGE UP (ref 27–34)
MCHC RBC-ENTMCNC: 33.2 GM/DL — SIGNIFICANT CHANGE UP (ref 32–36)
MCV RBC AUTO: 87.2 FL — SIGNIFICANT CHANGE UP (ref 80–100)
METHOD TYPE: SIGNIFICANT CHANGE UP
MONOCYTES # BLD AUTO: 0.67 K/UL — SIGNIFICANT CHANGE UP (ref 0–0.9)
MONOCYTES NFR BLD AUTO: 6.8 % — SIGNIFICANT CHANGE UP (ref 2–14)
NEUTROPHILS # BLD AUTO: 8.01 K/UL — HIGH (ref 1.8–7.4)
NEUTROPHILS NFR BLD AUTO: 81 % — HIGH (ref 43–77)
NRBC # BLD: 0 /100 WBCS — SIGNIFICANT CHANGE UP (ref 0–0)
ORGANISM # SPEC MICROSCOPIC CNT: SIGNIFICANT CHANGE UP
PHOSPHATE SERPL-MCNC: 1.4 MG/DL — LOW (ref 2.5–4.5)
PLATELET # BLD AUTO: 209 K/UL — SIGNIFICANT CHANGE UP (ref 150–400)
POTASSIUM SERPL-MCNC: 3.9 MMOL/L — SIGNIFICANT CHANGE UP (ref 3.5–5.3)
POTASSIUM SERPL-SCNC: 3.9 MMOL/L — SIGNIFICANT CHANGE UP (ref 3.5–5.3)
RBC # BLD: 4.45 M/UL — SIGNIFICANT CHANGE UP (ref 4.2–5.8)
RBC # FLD: 15 % — HIGH (ref 10.3–14.5)
SODIUM SERPL-SCNC: 139 MMOL/L — SIGNIFICANT CHANGE UP (ref 135–145)
SPECIMEN SOURCE: SIGNIFICANT CHANGE UP
WBC # BLD: 9.89 K/UL — SIGNIFICANT CHANGE UP (ref 3.8–10.5)
WBC # FLD AUTO: 9.89 K/UL — SIGNIFICANT CHANGE UP (ref 3.8–10.5)

## 2022-05-01 PROCEDURE — 99233 SBSQ HOSP IP/OBS HIGH 50: CPT

## 2022-05-01 RX ORDER — SODIUM,POTASSIUM PHOSPHATES 278-250MG
1 POWDER IN PACKET (EA) ORAL THREE TIMES A DAY
Refills: 0 | Status: COMPLETED | OUTPATIENT
Start: 2022-05-01 | End: 2022-05-02

## 2022-05-01 RX ADMIN — Medication 1 TABLET(S): at 21:42

## 2022-05-01 RX ADMIN — Medication 1 TABLET(S): at 14:30

## 2022-05-01 RX ADMIN — SENNA PLUS 2 TABLET(S): 8.6 TABLET ORAL at 21:42

## 2022-05-01 RX ADMIN — ENOXAPARIN SODIUM 40 MILLIGRAM(S): 100 INJECTION SUBCUTANEOUS at 14:30

## 2022-05-01 RX ADMIN — MEROPENEM 100 MILLIGRAM(S): 1 INJECTION INTRAVENOUS at 17:26

## 2022-05-01 RX ADMIN — PANTOPRAZOLE SODIUM 40 MILLIGRAM(S): 20 TABLET, DELAYED RELEASE ORAL at 11:30

## 2022-05-01 RX ADMIN — MEROPENEM 100 MILLIGRAM(S): 1 INJECTION INTRAVENOUS at 05:08

## 2022-05-01 NOTE — PROGRESS NOTE ADULT - SUBJECTIVE AND OBJECTIVE BOX
FLOYD BRINA    PLV 2EAS 217 W[address]    Allergies    No Known Allergies    Intolerances        PAST MEDICAL & SURGICAL HISTORY:  Gout    Enlarged prostate    No significant past surgical history        FAMILY HISTORY:      Home Medications:      MEDICATIONS  (STANDING):  enoxaparin Injectable 40 milliGRAM(s) SubCutaneous every 24 hours  meropenem  IVPB 500 milliGRAM(s) IV Intermittent every 12 hours  pantoprazole  Injectable 40 milliGRAM(s) IV Push daily  potassium phosphate / sodium phosphate Tablet (K-PHOS No. 2) 1 Tablet(s) Oral three times a day  senna 2 Tablet(s) Oral at bedtime  sodium chloride 0.45%. 1000 milliLiter(s) (75 mL/Hr) IV Continuous <Continuous>  tamsulosin 0.8 milliGRAM(s) Oral once    MEDICATIONS  (PRN):  acetaminophen     Tablet .. 650 milliGRAM(s) Oral every 6 hours PRN Temp greater or equal to 38C (100.4F), Mild Pain (1 - 3)  HYDROmorphone  Injectable 0.5 milliGRAM(s) IV Push every 3 hours PRN Severe Pain (7 - 10)  melatonin 5 milliGRAM(s) Oral at bedtime PRN Insomnia  oxyCODONE    IR 5 milliGRAM(s) Oral every 4 hours PRN Moderate Pain (4 - 6)      Diet, Regular (04-29-22 @ 13:07) [Active]          Vital Signs Last 24 Hrs  T(C): 36.8 (01 May 2022 04:35), Max: 37.4 (30 Apr 2022 19:56)  T(F): 98.2 (01 May 2022 04:35), Max: 99.4 (30 Apr 2022 19:56)  HR: 89 (01 May 2022 04:35) (74 - 89)  BP: 163/90 (01 May 2022 04:35) (150/90 - 163/90)  BP(mean): --  RR: 17 (01 May 2022 04:35) (17 - 18)  SpO2: 96% (01 May 2022 04:35) (93% - 96%)      04-30-22 @ 07:01  -  05-01-22 @ 07:00  --------------------------------------------------------  IN: 0 mL / OUT: 3410 mL / NET: -3410 mL              LABS:                        12.9   9.89  )-----------( 209      ( 01 May 2022 07:40 )             38.8     05-01    139  |  107  |  46<H>  ----------------------------<  67<L>  3.9   |  25  |  2.40<H>    Ca    9.5      01 May 2022 07:40  Phos  1.4     05-01  Mg     2.2     05-01                WBC:  WBC Count: 9.89 K/uL (05-01 @ 07:40)  WBC Count: 15.34 K/uL (04-30 @ 06:34)  WBC Count: 20.26 K/uL (04-29 @ 04:36)  WBC Count: 7.75 K/uL (04-28 @ 13:04)      MICROBIOLOGY:  RECENT CULTURES:  04-29 .Blood Blood-Peripheral XXXX XXXX   No growth to date.    04-28 Clean Catch Clean Catch (Midstream) XXXX XXXX   >100,000 CFU/ml Enterobacter cloacae complex    04-28 .Blood Blood-Peripheral Blood Culture PCR  Escherichia coli ESBL   Growth in aerobic bottle: Gram Variable Rods  Growth in anaerobic bottle: Gram Negative Rods   Growth in aerobic and anaerobic bottles: Escherichia coli ESBL  ***Blood Panel PCR results on this specimen are available  approximately 3 hours after the Gram stain result.***  Gram stain, PCR, and/or culture results may not always  correspond dueto difference in methodologies.  ************************************************************  This PCR assay was performed by multiplex PCR. This  Assay tests for 66 bacterial and resistance gene targets.  Please refer to the [facility] test directory  at [email or web address] for details.                    Sodium:  Sodium, Serum: 139 mmol/L (05-01 @ 07:40)  Sodium, Serum: 142 mmol/L (04-30 @ 06:34)  Sodium, Serum: 138 mmol/L (04-29 @ 04:36)  Sodium, Serum: 138 mmol/L (04-28 @ 13:04)      2.40 mg/dL 05-01 @ 07:40  2.70 mg/dL 04-30 @ 06:34  2.70 mg/dL 04-29 @ 04:36  2.50 mg/dL 04-28 @ 13:04      Hemoglobin:  Hemoglobin: 12.9 g/dL (05-01 @ 07:40)  Hemoglobin: 12.6 g/dL (04-30 @ 06:34)  Hemoglobin: 12.1 g/dL (04-29 @ 04:36)  Hemoglobin: 13.7 g/dL (04-28 @ 13:04)      Platelets: Platelet Count - Automated: 209 K/uL (05-01 @ 07:40)  Platelet Count - Automated: 209 K/uL (04-30 @ 06:34)  Platelet Count - Automated: 188 K/uL (04-29 @ 04:36)  Platelet Count - Automated: 245 K/uL (04-28 @ 13:04)              RADIOLOGY & ADDITIONAL STUDIES:      MICROBIOLOGY:  RECENT CULTURES:  04-29 .Blood Blood-Peripheral XXXX XXXX   No growth to date.    04-28 Clean Catch Clean Catch (Midstream) XXXX XXXX   >100,000 CFU/ml Enterobacter cloacae complex    04-28 .Blood Blood-Peripheral Blood Culture PCR  Escherichia coli ESBL   Growth in aerobic bottle: Gram Variable Rods  Growth in anaerobic bottle: Gram Negative Rods   Growth in aerobic and anaerobic bottles: Escherichia coli ESBL  ***Blood Panel PCR results on this specimen are available  approximately 3 hours after the Gram stain result.***  Gram stain, PCR, and/or culture results may not always  correspond dueto difference in methodologies.  ************************************************************  This PCR assay was performed by multiplex PCR. This  Assay tests for 66 bacterial and resistance gene targets.  Please refer to the [facility] test directory  at [email or web address] for details.

## 2022-05-01 NOTE — PROGRESS NOTE ADULT - NSPROGADDITIONALINFOA_GEN_ALL_CORE
Agree with above, still with elevated WBC   PLan pt wants to go home with IV antibiotics, will discuss with Dr Arellano in am

## 2022-05-01 NOTE — PROGRESS NOTE ADULT - SUBJECTIVE AND OBJECTIVE BOX
Patient is a 67y Male whom presented to the hospital with ckd and aaron     PAST MEDICAL & SURGICAL HISTORY:  Gout    Enlarged prostate    No significant past surgical history        MEDICATIONS  (STANDING):  acetaminophen   IVPB .. 1000 milliGRAM(s) IV Intermittent once  piperacillin/tazobactam IVPB.. 3.375 Gram(s) IV Intermittent every 8 hours  sodium chloride 0.9%. 1000 milliLiter(s) (75 mL/Hr) IV Continuous <Continuous>  tamsulosin 0.8 milliGRAM(s) Oral once      Allergies    No Known Allergies    Intolerances        SOCIAL HISTORY:  Denies ETOh,Smoking,     FAMILY HISTORY:      REVIEW OF SYSTEMS:    CONSTITUTIONAL: No weakness, fevers or chills  RESPIRATORY: No cough, wheezing, hemoptysis; No shortness of breath  CARDIOVASCULAR: No chest pain or palpitations  GASTROINTESTINAL: pos  abdominal ,  epigastric pain. No nausea, vomiting,     No diarrhea or constipation. No melena   GENITOURINARY: No dysuria, frequency or hematuria                            12.9   9.89  )-----------( 209      ( 01 May 2022 07:40 )             38.8       CBC Full  -  ( 01 May 2022 07:40 )  WBC Count : 9.89 K/uL  RBC Count : 4.45 M/uL  Hemoglobin : 12.9 g/dL  Hematocrit : 38.8 %  Platelet Count - Automated : 209 K/uL  Mean Cell Volume : 87.2 fl  Mean Cell Hemoglobin : 29.0 pg  Mean Cell Hemoglobin Concentration : 33.2 gm/dL  Auto Neutrophil # : 8.01 K/uL  Auto Lymphocyte # : 0.82 K/uL  Auto Monocyte # : 0.67 K/uL  Auto Eosinophil # : 0.29 K/uL  Auto Basophil # : 0.03 K/uL  Auto Neutrophil % : 81.0 %  Auto Lymphocyte % : 8.3 %  Auto Monocyte % : 6.8 %  Auto Eosinophil % : 2.9 %  Auto Basophil % : 0.3 %      05-01    139  |  107  |  46<H>  ----------------------------<  67<L>  3.9   |  25  |  2.40<H>    Ca    9.5      01 May 2022 07:40  Phos  1.4     05-01  Mg     2.2     05-01        CAPILLARY BLOOD GLUCOSE          Vital Signs Last 24 Hrs  T(C): 36.6 (01 May 2022 11:59), Max: 37.4 (30 Apr 2022 19:56)  T(F): 97.8 (01 May 2022 11:59), Max: 99.4 (30 Apr 2022 19:56)  HR: 66 (01 May 2022 11:59) (66 - 89)  BP: 138/91 (01 May 2022 11:59) (138/91 - 163/90)  BP(mean): --  RR: 18 (01 May 2022 11:59) (17 - 18)  SpO2: 95% (01 May 2022 11:59) (95% - 96%)              PHYSICAL EXAM:    Constitutional: NAD  HEENT: conjunctive   clear   Neck:  No JVD  Respiratory: CTAB  Cardiovascular: S1 and S2  Gastrointestinal: BS+, soft, positive Tender /ND  Extremities: No peripheral edema          MEDICATIONS  (STANDING):  acetaminophen   IVPB .. 1000 milliGRAM(s) IV Intermittent once  piperacillin/tazobactam IVPB.. 3.375 Gram(s) IV Intermittent every 8 hours  sodium chloride 0.9%. 1000 milliLiter(s) (75 mL/Hr) IV Continuous <Continuous>  tamsulosin 0.8 milliGRAM(s) Oral once

## 2022-05-01 NOTE — PROGRESS NOTE ADULT - SUBJECTIVE AND OBJECTIVE BOX
POD#3 s/p laparoscopic appendectomy    SUBJECTIVE:  Patient seen and examined at bedside.  No overnight events.  Patient reports mild abdominal pain when he coughs.  Also would like castillo out.  Admits to passing flatus; no BM.  Reports minimal ambulation. Tolerating diet.  Patient denies any fever, chills, chest pain, shortness of breath, nausea or vomiting.    VITALS  Vital Signs Last 24 Hrs  T(C): 36.8 (01 May 2022 04:35), Max: 37.4 (30 Apr 2022 19:56)  T(F): 98.2 (01 May 2022 04:35), Max: 99.4 (30 Apr 2022 19:56)  HR: 89 (01 May 2022 04:35) (74 - 89)  BP: 163/90 (01 May 2022 04:35) (150/90 - 163/90)  RR: 17 (01 May 2022 04:35) (17 - 18)  SpO2: 96% (01 May 2022 04:35) (93% - 96%)    PHYSICAL EXAM  GENERAL:  Well-nourished, well-developed male lying comfortably in bed in NAD.  HEENT:  Sclera white. Mucous membranes moist.  CARDIO:  Regular rate and rhythm.  No murmur, gallop or rub appreciated.  RESPIRATORY:  Clear to auscultation bilaterally.  No wheezing, rales or rhonchi appreciated.  ABDOMEN:  Soft, nondistended, nontender; Trocar sites remain well-approximated with dermabond. Josué drain in place with approximately 10cc serous fluid.  No rebound tenderness or guarding.  : Castillo in place with 850cc clear yellow urine.  EXTREMITIES: No calf tenderness bilaterally  SKIN:  No jaundice, pallor, or cyanosis  NEURO:  A&O x 3    INTAKE & OUTPUT  I&O's Summary    29 Apr 2022 07:01  -  30 Apr 2022 07:00  --------------------------------------------------------  IN: 0 mL / OUT: 5650 mL / NET: -5650 mL    30 Apr 2022 07:01  -  01 May 2022 05:25  --------------------------------------------------------  IN: 0 mL / OUT: 3410 mL / NET: -3410 mL    I&O's Detail    29 Apr 2022 07:01  -  30 Apr 2022 07:00  --------------------------------------------------------  IN:  Total IN: 0 mL    OUT:    Bulb (mL): 100 mL    Indwelling Catheter - Urethral (mL): 5550 mL  Total OUT: 5650 mL    Total NET: -5650 mL    30 Apr 2022 07:01  -  01 May 2022 05:25  --------------------------------------------------------  IN:  Total IN: 0 mL    OUT:    Bulb (mL): 10 mL    Indwelling Catheter - Urethral (mL): 3400 mL  Total OUT: 3410 mL    Total NET: -3410 mL    MEDICATIONS  MEDICATIONS  (STANDING):  enoxaparin Injectable 40 milliGRAM(s) SubCutaneous every 24 hours  meropenem  IVPB 500 milliGRAM(s) IV Intermittent every 12 hours  pantoprazole  Injectable 40 milliGRAM(s) IV Push daily  senna 2 Tablet(s) Oral at bedtime  sodium chloride 0.45%. 1000 milliLiter(s) (75 mL/Hr) IV Continuous <Continuous>  tamsulosin 0.8 milliGRAM(s) Oral once    MEDICATIONS  (PRN):  acetaminophen     Tablet .. 650 milliGRAM(s) Oral every 6 hours PRN Temp greater or equal to 38C (100.4F), Mild Pain (1 - 3)  HYDROmorphone  Injectable 0.5 milliGRAM(s) IV Push every 3 hours PRN Severe Pain (7 - 10)  melatonin 5 milliGRAM(s) Oral at bedtime PRN Insomnia  oxyCODONE    IR 5 milliGRAM(s) Oral every 4 hours PRN Moderate Pain (4 - 6)    LABS:                        12.6   15.34 )-----------( 209      ( 30 Apr 2022 06:34 )             38.1     04-30    142  |  111<H>  |  44<H>  ----------------------------<  77  3.8   |  23  |  2.70<H>    Ca    9.4      30 Apr 2022 06:34    Culture - Blood (collected 29 Apr 2022 16:15)  Source: .Blood Blood-Peripheral  Preliminary Report (30 Apr 2022 17:01):    No growth to date.    Culture - Blood (collected 29 Apr 2022 16:15)  Source: .Blood Blood-Peripheral  Preliminary Report (30 Apr 2022 17:01):    No growth to date.    Culture - Urine (collected 28 Apr 2022 22:12)  Source: Clean Catch Clean Catch (Midstream)  Preliminary Report (30 Apr 2022 18:19):    >100,000 CFU/ml Enterobacter cloacae complex    Culture - Blood (collected 28 Apr 2022 18:00)  Source: .Blood Blood-Peripheral  Gram Stain (29 Apr 2022 18:55):    Growth in aerobic bottle:    Gram Negative Rods    Growth in anaerobic bottle: Gram Negative Rods  Preliminary Report (29 Apr 2022 18:55):    Growth in aerobic bottle:    Gram Negative Rods    Growth in anaerobic bottle: Gram Negative Rods    Culture - Blood (collected 28 Apr 2022 18:00)  Source: .Blood Blood-Peripheral  Gram Stain (29 Apr 2022 12:17):    Growth in aerobic bottle: Gram Variable Rods    Growth in anaerobic bottle: Gram Negative Rods  Preliminary Report (30 Apr 2022 12:25):    Growth in aerobic and anaerobic bottles: Escherichia coli Susceptibility    to follow.    ***Blood Panel PCR results on this specimen are available    approximately 3 hours after the Gram stain result.***    Gram stain, PCR, and/or culture results may not always    correspond due to difference in methodologies.    ************************************************************    This PCR assay was performed by multiplex PCR. This    Assay tests for 66 bacterial and resistance gene targets.    Please refer to the [facility] test directory    at [email or web address] for details.  Organism: Blood Culture PCR (29 Apr 2022 09:43)  Organism: Blood Culture PCR (29 Apr 2022 09:43)

## 2022-05-01 NOTE — PROGRESS NOTE ADULT - SUBJECTIVE AND OBJECTIVE BOX
[facility]  INFECTIOUS DISEASES   [address] [place] [address]  Tel: [phone]     Fax: [phone]  ======================================================  MD Jessica Alicea Kaushal, MD Cho, Michelle, MD   ======================================================    [id number]  FLOYD GONSALVES     Follow up: Bacteremia and acute appendicitis     No complaint, doing well, no fever, on regular diet tolerating well.   Still has the drain with more clear serosanguinous fluid.    PAST MEDICAL & SURGICAL HISTORY:  Gout  Enlarged prostate    No significant past surgical history    Social Hx:  No smoking, ETOH or drugs     FAMILY HISTORY: No pertinent medical history     Allergies  No Known Allergies    Antibiotics:  MEDICATIONS  (STANDING):  enoxaparin Injectable 40 milliGRAM(s) SubCutaneous every 24 hours  meropenem  IVPB 500 milliGRAM(s) IV Intermittent every 12 hours  pantoprazole  Injectable 40 milliGRAM(s) IV Push daily  senna 2 Tablet(s) Oral at bedtime  sodium chloride 0.9%. 1000 milliLiter(s) (50 mL/Hr) IV Continuous <Continuous>  tamsulosin 0.8 milliGRAM(s) Oral once     REVIEW OF SYSTEMS:  CONSTITUTIONAL:  No Fever or chills  HEENT:  No diplopia or blurred vision.  No sore throat or runny nose.  CARDIOVASCULAR:  No chest pain or SOB.  RESPIRATORY:  No cough, shortness of breath, PND or orthopnea.  GASTROINTESTINAL:  No nausea, vomiting or diarrhea.  GENITOURINARY:  No dysuria, frequency or urgency. No Blood in urine  MUSCULOSKELETAL:  no joint aches, no muscle pain  SKIN:  No change in skin, hair or nails.  NEUROLOGIC:  No paresthesias, fasciculations, seizures or weakness.  PSYCHIATRIC:  No disorder of thought or mood.  ENDOCRINE:  No heat or cold intolerance, polyuria or polydipsia.  HEMATOLOGICAL:  No easy bruising or bleeding.     Physical Exam:  Vital Signs Last 24 Hrs  T(C): 36.6 (01 May 2022 11:59), Max: 37.4 (30 Apr 2022 19:56)  T(F): 97.8 (01 May 2022 11:59), Max: 99.4 (30 Apr 2022 19:56)  HR: 66 (01 May 2022 11:59) (66 - 89)  BP: 138/91 (01 May 2022 11:59) (138/91 - 163/90)  BP(mean): --  RR: 18 (01 May 2022 11:59) (17 - 18)  SpO2: 95% (01 May 2022 11:59) (93% - 96%)  GEN: NAD  HEENT: normocephalic and atraumatic. EOMI. PERRL.    NECK: Supple.  No lymphadenopathy   LUNGS: Clear to auscultation.  HEART: Regular rate and rhythm without murmur.  ABDOMEN: Soft, nontender, and nondistended.  Positive bowel sounds.    Wounds look clean, drain in place with purulent discharge  : No CVA tenderness  EXTREMITIES: Without any cyanosis, clubbing, rash, lesions or edema.  NEUROLOGIC: grossly intact.  PSYCHIATRIC: Appropriate affect .  SKIN: No ulceration or induration present.      Labs:                        12.9   9.89  )-----------( 209      ( 01 May 2022 07:40 )             38.8     05-01    139  |  107  |  46<H>  ----------------------------<  67<L>  3.9   |  25  |  2.40<H>    Ca    9.5      01 May 2022 07:40  Phos  1.4     05-01  Mg     2.2     05-01    Culture - Blood (collected 04-29-22 @ 16:15)  Source: .Blood Blood-Peripheral    Culture - Blood (collected 04-29-22 @ 16:15)  Source: .Blood Blood-Peripheral    Culture - Urine (collected 04-28-22 @ 22:12)  Source: Clean Catch Clean Catch (Midstream)    Culture - Blood (collected 04-28-22 @ 18:00)  Source: .Blood Blood-Peripheral  Gram Stain (04-29-22 @ 18:55):    Growth in aerobic bottle:    Gram Negative Rods    Growth in anaerobic bottle: Gram Negative Rods  Final Report (05-01-22 @ 08:54):    Growth in aerobic bottle: Pseudomonas aeruginosa    Growth in aerobic and anaerobic bottles: Escherichia coli ESBL    See previous culture [id number]  Organism: Pseudomonas aeruginosa (05-01-22 @ 08:54)  Organism: Pseudomonas aeruginosa (05-01-22 @ 08:54)    Sensitivities:      -  Amikacin: S <=16      -  Aztreonam: S <=4      -  Cefepime: S 4      -  Ceftazidime: S <=1      -  Ciprofloxacin: S 0.5      -  Gentamicin: S 4      -  Imipenem: S 2      -  Levofloxacin: S <=0.5      -  Meropenem: S <=1      -  Piperacillin/Tazobactam: S <=8      -  Tobramycin: S <=2      Method Type: MARGRET    Culture - Blood (collected 04-28-22 @ 18:00)  Source: .Blood Blood-Peripheral  Gram Stain (04-29-22 @ 12:17):    Growth in aerobic bottle: Gram Variable Rods    Growth in anaerobic bottle: Gram Negative Rods  Final Report (05-01-22 @ 08:05):    Growth in aerobic and anaerobic bottles: Escherichia coli ESBL    ***Blood Panel PCR results on this specimen are available    approximately 3 hours after the Gram stain result.***    Gram stain, PCR, and/or culture results may not always    correspond dueto difference in methodologies.    ************************************************************    This PCR assay was performed by multiplex PCR. This    Assay tests for 66 bacterial and resistance gene targets.    Please refer to the [facility] Labs test directory    at [email or web address] for details.  Organism: Blood Culture PCR  Escherichia coli ESBL (05-01-22 @ 08:05)  Organism: Escherichia coli ESBL (05-01-22 @ 08:05)    Sensitivities:      -  Amikacin: S <=16      -  Ampicillin: R >16 These ampicillin results predict results for amoxicillin      -  Ampicillin/Sulbactam: R <=4/2 Enterobacter, Klebsiella aerogenes, Citrobacter, and Serratia may develop resistance during prolonged therapy (3-4 days)      -  Aztreonam: R >16      -  Cefazolin: R >16 Enterobacter, Klebsiella aerogenes, Citrobacter, and Serratia may develop resistance during prolonged therapy (3-4 days)      -  Cefepime: R 8      -  Ceftriaxone: R >32 Enterobacter, Klebsiella aerogenes, Citrobacter, and Serratia may develop resistance during prolonged therapy      -  Ciprofloxacin: R >2      -  Ertapenem: S <=0.5      -  Gentamicin: S <=2      -  Imipenem: S <=1      -  Levofloxacin: R >4      -  Meropenem: S <=1      -  Piperacillin/Tazobactam: R <=8      -  Tobramycin: S <=2      -  Trimethoprim/Sulfamethoxazole: S <=0.5/9.5      Method Type: MARGRET  Organism: Blood Culture PCR (05-01-22 @ 08:05)    Sensitivities:      -  CTX-M Resistance Marker: Detec      -  ESBL: Detec      -  Escherichia coli: Detec      Method Type: PCR    WBC Count: 9.89 K/uL (05-01-22 @ 07:40)  WBC Count: 15.34 K/uL (04-30-22 @ 06:34)  WBC Count: 20.26 K/uL (04-29-22 @ 04:36)  WBC Count: 7.75 K/uL (04-28-22 @ 13:04)    Creatinine, Serum: 2.40 mg/dL (05-01-22 @ 07:40)  Creatinine, Serum: 2.70 mg/dL (04-30-22 @ 06:34)  Creatinine, Serum: 2.70 mg/dL (04-29-22 @ 04:36)  Creatinine, Serum: 2.50 mg/dL (04-28-22 @ 13:04)     COVID-19 PCR: NotDetec (04-28-22 @ 13:04)    All imaging and other data have been reviewed.  < from: CT Abdomen and Pelvis No Cont (04.28.22 @ 14:22) >  ACC: [id number] EXAM:  CT ABDOMEN AND PELVIS                        PROCEDURE DATE:  04/28/2022    INTERPRETATION:  CLINICAL INFORMATION: Right lower quadrant abdominal   pain, radiating to groin.  COMPARISON: None.  CONTRAST/COMPLICATIONS:  IV Contrast: NONE  Oral Contrast: NONE  Complications: None reported at time of study completion  PROCEDURE:  CT of the Abdomen and Pelvis was performed.  Sagittal and coronal reformats were performed.  FINDINGS:  LOWER CHEST:  Mild dependent bibasilar atelectatic changes.  The evaluation of the solid organ parenchyma is limited without   intravenous contrast.  LIVER: Within normal limits.  BILE DUCTS: Normal caliber.  GALLBLADDER: Within normal limits.  SPLEEN: Borderline enlarged.  PANCREAS: Within normal limits.  ADRENALS: Within normal limits.  KIDNEYS/URETERS:  Bilateral renal cysts and several small indeterminate hypodense right   renal lesions.  Small hyperdense lesion medial interpolar left kidney.  Atrophic right kidney.  Prominent right extrarenal pelvis.  Mild left-sided hydroureteronephrosis, without obstructing ureteral   calculus.  BLADDER: Small focus of air within the urinary bladder, correlate   clinically for recent instrumentation versus infection.  REPRODUCTIVE ORGANS: Markedly enlarged prostate gland with intravesicular   extension.  BOWEL: Small hiatal hernia.  No bowel obstruction.  Dilated appendix, measuring up to 2 cm, with 1 cm calcified appendicolith   at base and 1.4 cm calcified appendicolith within body. There is   significant periappendiceal stranding, with inflammatory stranding   extending along the right posterior flank and pelvis.  There is thickening at the cecal apex.  PERITONEUM: No ascites.  No localized intra-abdominal fluid collection or pneumoperitoneum noted.  VESSELS: Within normal limits.  RETROPERITONEUM/LYMPH NODES: No lymphadenopathy.  ABDOMINAL WALL: Small bilateral fat-containing inguinal hernias.  BONES: Within normal limits.  IMPRESSION:  CT findings compatible with acute appendicitis as discussed above.  Prostatomegaly.    Assessment and Plan:   68 yo man with PMH of BPH and CKD/GABBY was admitted with abdominal pain for one day. CT consistent with Acute appendicitis so taken to OR for Lap appendectomy on 4/28.   Today Blood culture is back positive for ESBL ecoli.   Most likely due to perforated appendicitis, even though has an enlarged Prostate but he is asymptomatic.     Bacteremia   - Blood culture with ESBL Ecoli and pseudomonas 4/28  - Repeat blood cultures NGTD 4/29  - Continue Meropenem 500mg q12, no need for double coverage since repeat culture negative and pseudomonas S to carbapenems  - Follow Creat to adjust ABx  - Trend WBC, today 20k-->9k  - Continue castillo as per  for urinary retention   - Will need Midline     Will follow.    Natacha Kramer MD  Division of Infectious Diseases   Please call ID service at [phone] with any question.      35 minutes spent on total encounter assessing patient, examination, chart reivew, counseling and coordinating care by the attending physician/nurse/care manager.

## 2022-05-01 NOTE — PROVIDER CONTACT NOTE (CRITICAL VALUE NOTIFICATION) - SITUATION
positive blood Cx
positive bofy fluid cx
Surgical PA made aware 4/28 blood culture preliminary aerobic bottle gram variable rods. Anaerobic bottle gram negative deepika. Second bottle 4/28 preliminary aerobic bottle gram negative rods

## 2022-05-01 NOTE — PROVIDER CONTACT NOTE (CRITICAL VALUE NOTIFICATION) - TEST AND RESULT REPORTED:
Blood cultures
blood culture aerobic bottle gram variable rods
BC growth anaerobic and aerobic ecoli, esbl, pseidomonas in aerobic bottle
body fluid Cx gr stain polymorpho nuclear leukocytes seen gr neg rods seen by cyto centrafuge

## 2022-05-01 NOTE — PROVIDER CONTACT NOTE (CRITICAL VALUE NOTIFICATION) - ACTION/TREATMENT ORDERED:
Notified Surgical PA of results and she stated, "Okay thank you".
already on merdamirnum
Ok, no further orders at this time

## 2022-05-01 NOTE — PROGRESS NOTE ADULT - SUBJECTIVE AND OBJECTIVE BOX
INTERVAL Hx:  Recovering well from acute appendicitis, s/p lap appy.  Pt normally self caths at home, due to a markedly enlarged prostate.  Currently has indwelling castillo.  On meropenem.  Urine culture + for ESWL E. coli.    MEDICATIONS  (STANDING):  enoxaparin Injectable 40 milliGRAM(s) SubCutaneous every 24 hours  meropenem  IVPB 500 milliGRAM(s) IV Intermittent every 12 hours  pantoprazole  Injectable 40 milliGRAM(s) IV Push daily  potassium phosphate / sodium phosphate Tablet (K-PHOS No. 2) 1 Tablet(s) Oral three times a day  senna 2 Tablet(s) Oral at bedtime  sodium chloride 0.45%. 1000 milliLiter(s) (75 mL/Hr) IV Continuous <Continuous>  tamsulosin 0.8 milliGRAM(s) Oral once    MEDICATIONS  (PRN):  acetaminophen     Tablet .. 650 milliGRAM(s) Oral every 6 hours PRN Temp greater or equal to 38C (100.4F), Mild Pain (1 - 3)  HYDROmorphone  Injectable 0.5 milliGRAM(s) IV Push every 3 hours PRN Severe Pain (7 - 10)  melatonin 5 milliGRAM(s) Oral at bedtime PRN Insomnia  oxyCODONE    IR 5 milliGRAM(s) Oral every 4 hours PRN Moderate Pain (4 - 6)        Vital Signs Last 24 Hrs  T(C): 36.6 (01 May 2022 11:59), Max: 37.4 (30 Apr 2022 19:56)  T(F): 97.8 (01 May 2022 11:59), Max: 99.4 (30 Apr 2022 19:56)  HR: 66 (01 May 2022 11:59) (66 - 89)  BP: 138/91 (01 May 2022 11:59) (138/91 - 163/90)  BP(mean): --  RR: 18 (01 May 2022 11:59) (17 - 18)  SpO2: 95% (01 May 2022 11:59) (95% - 96%)    PHYSICAL EXAM:    ABDOMEN:  soft, NT    Castillo: draining clear, yellow urine    LABS:                        12.9   9.89  )-----------( 209      ( 01 May 2022 07:40 )             38.8     05-01    139  |  107  |  46<H>  ----------------------------<  67<L>  3.9   |  25  |  2.40<H>    Ca    9.5      01 May 2022 07:40  Phos  1.4     05-01  Mg     2.2     05-01      Urine culture:  04-29 @ 16:15 --   No growth to date.  Urine culture:  04-28 @ 22:12 --   >100,000 CFU/ml Enterobacter cloacae complex  Urine culture:  04-28 @ 18:00 --   Growth in aerobic and anaerobic bottles: Escherichia coli ESBL  ***Blood Panel PCR results on this specimen are available  approximately 3 hours after the Gram stain result.***  Gram stain, PCR, and/or culture results may not always  correspond dueto difference in methodologies.  ************************************************************  This PCR assay was performed by multiplex PCR. This  Assay tests for 66 bacterial and resistance gene targets.  Please refer to the [facility] Labs test directory  at [email or web address] for details.

## 2022-05-01 NOTE — PROGRESS NOTE ADULT - PROBLEM SELECTOR PLAN 1
- Continue regular diet  - Continue meropenem  - Monitor drain output  - DVT prophylaxis with lovenox  - Encourage OOB  - Pain control PRN  - Incentive spirometry  - Follow up AM labs  - Patient agreeable to leave castillo for now; per urology, should stay in until discharge  - Will endorse to day team to follow up and discuss with Dr. Tenorio (covering Dr. Arellano)

## 2022-05-02 ENCOUNTER — TRANSCRIPTION ENCOUNTER (OUTPATIENT)
Age: 67
End: 2022-05-02

## 2022-05-02 DIAGNOSIS — N18.9 CHRONIC KIDNEY DISEASE, UNSPECIFIED: ICD-10-CM

## 2022-05-02 DIAGNOSIS — R78.81 BACTEREMIA: ICD-10-CM

## 2022-05-02 LAB
ANION GAP SERPL CALC-SCNC: 8 MMOL/L — SIGNIFICANT CHANGE UP (ref 5–17)
BUN SERPL-MCNC: 50 MG/DL — HIGH (ref 7–23)
CALCIUM SERPL-MCNC: 9.4 MG/DL — SIGNIFICANT CHANGE UP (ref 8.5–10.1)
CHLORIDE SERPL-SCNC: 106 MMOL/L — SIGNIFICANT CHANGE UP (ref 96–108)
CO2 SERPL-SCNC: 25 MMOL/L — SIGNIFICANT CHANGE UP (ref 22–31)
CREAT SERPL-MCNC: 2.4 MG/DL — HIGH (ref 0.5–1.3)
EGFR: 29 ML/MIN/1.73M2 — LOW
GLUCOSE SERPL-MCNC: 69 MG/DL — LOW (ref 70–99)
HCT VFR BLD CALC: 42.2 % — SIGNIFICANT CHANGE UP (ref 39–50)
HGB BLD-MCNC: 13.4 G/DL — SIGNIFICANT CHANGE UP (ref 13–17)
MCHC RBC-ENTMCNC: 28.1 PG — SIGNIFICANT CHANGE UP (ref 27–34)
MCHC RBC-ENTMCNC: 31.8 GM/DL — LOW (ref 32–36)
MCV RBC AUTO: 88.5 FL — SIGNIFICANT CHANGE UP (ref 80–100)
NRBC # BLD: 0 /100 WBCS — SIGNIFICANT CHANGE UP (ref 0–0)
PHOSPHATE SERPL-MCNC: 2.1 MG/DL — LOW (ref 2.5–4.5)
PLATELET # BLD AUTO: 220 K/UL — SIGNIFICANT CHANGE UP (ref 150–400)
POTASSIUM SERPL-MCNC: 3.9 MMOL/L — SIGNIFICANT CHANGE UP (ref 3.5–5.3)
POTASSIUM SERPL-SCNC: 3.9 MMOL/L — SIGNIFICANT CHANGE UP (ref 3.5–5.3)
RBC # BLD: 4.77 M/UL — SIGNIFICANT CHANGE UP (ref 4.2–5.8)
RBC # FLD: 14.8 % — HIGH (ref 10.3–14.5)
SODIUM SERPL-SCNC: 139 MMOL/L — SIGNIFICANT CHANGE UP (ref 135–145)
SURGICAL PATHOLOGY STUDY: SIGNIFICANT CHANGE UP
WBC # BLD: 9.95 K/UL — SIGNIFICANT CHANGE UP (ref 3.8–10.5)
WBC # FLD AUTO: 9.95 K/UL — SIGNIFICANT CHANGE UP (ref 3.8–10.5)

## 2022-05-02 PROCEDURE — 36558 INSERT TUNNELED CV CATH: CPT

## 2022-05-02 PROCEDURE — 99233 SBSQ HOSP IP/OBS HIGH 50: CPT

## 2022-05-02 PROCEDURE — 76937 US GUIDE VASCULAR ACCESS: CPT | Mod: 26

## 2022-05-02 PROCEDURE — 77001 FLUOROGUIDE FOR VEIN DEVICE: CPT | Mod: 26

## 2022-05-02 RX ORDER — MEROPENEM 1 G/30ML
1000 INJECTION INTRAVENOUS
Qty: 20000 | Refills: 0
Start: 2022-05-02 | End: 2022-05-11

## 2022-05-02 RX ORDER — METOPROLOL TARTRATE 50 MG
12.5 TABLET ORAL
Qty: 0 | Refills: 0 | DISCHARGE
Start: 2022-05-02

## 2022-05-02 RX ORDER — TAMSULOSIN HYDROCHLORIDE 0.4 MG/1
1 CAPSULE ORAL
Qty: 30 | Refills: 0
Start: 2022-05-02 | End: 2022-05-31

## 2022-05-02 RX ORDER — SODIUM CHLORIDE 9 MG/ML
10 INJECTION INTRAMUSCULAR; INTRAVENOUS; SUBCUTANEOUS
Refills: 0 | Status: DISCONTINUED | OUTPATIENT
Start: 2022-05-02 | End: 2022-05-03

## 2022-05-02 RX ORDER — CHLORHEXIDINE GLUCONATE 213 G/1000ML
1 SOLUTION TOPICAL
Refills: 0 | Status: DISCONTINUED | OUTPATIENT
Start: 2022-05-02 | End: 2022-05-03

## 2022-05-02 RX ORDER — SODIUM,POTASSIUM PHOSPHATES 278-250MG
1 POWDER IN PACKET (EA) ORAL THREE TIMES A DAY
Refills: 0 | Status: COMPLETED | OUTPATIENT
Start: 2022-05-02 | End: 2022-05-02

## 2022-05-02 RX ORDER — OXYCODONE HYDROCHLORIDE 5 MG/1
1 TABLET ORAL
Qty: 15 | Refills: 0
Start: 2022-05-02

## 2022-05-02 RX ORDER — MEROPENEM 1 G/30ML
1000 INJECTION INTRAVENOUS EVERY 12 HOURS
Refills: 0 | Status: DISCONTINUED | OUTPATIENT
Start: 2022-05-02 | End: 2022-05-03

## 2022-05-02 RX ORDER — METOPROLOL TARTRATE 50 MG
12.5 TABLET ORAL DAILY
Refills: 0 | Status: DISCONTINUED | OUTPATIENT
Start: 2022-05-02 | End: 2022-05-03

## 2022-05-02 RX ADMIN — Medication 1 TABLET(S): at 16:21

## 2022-05-02 RX ADMIN — Medication 1 TABLET(S): at 11:06

## 2022-05-02 RX ADMIN — PANTOPRAZOLE SODIUM 40 MILLIGRAM(S): 20 TABLET, DELAYED RELEASE ORAL at 11:06

## 2022-05-02 RX ADMIN — MEROPENEM 100 MILLIGRAM(S): 1 INJECTION INTRAVENOUS at 17:23

## 2022-05-02 RX ADMIN — Medication 1 TABLET(S): at 06:15

## 2022-05-02 RX ADMIN — MEROPENEM 100 MILLIGRAM(S): 1 INJECTION INTRAVENOUS at 06:15

## 2022-05-02 RX ADMIN — ENOXAPARIN SODIUM 40 MILLIGRAM(S): 100 INJECTION SUBCUTANEOUS at 16:22

## 2022-05-02 RX ADMIN — SENNA PLUS 2 TABLET(S): 8.6 TABLET ORAL at 21:51

## 2022-05-02 RX ADMIN — Medication 1 TABLET(S): at 21:51

## 2022-05-02 NOTE — DISCHARGE NOTE PROVIDER - NSDCMRMEDTOKEN_GEN_ALL_CORE_FT
Flomax 0.4 mg oral capsule: 1 cap(s) orally once a day MDD:1  meropenem 1000 mg/ 50 mL-NaCl 0.9% intravenous solution: 1000 milligram(s) intravenously every 12 hours MDD:2000 mg  oxyCODONE 5 mg oral tablet: 1 tab(s) orally every 6 hours, As Needed -for moderate pain MDD:4   Tylenol 500 mg oral tablet: 2 tab(s) orally every 6 hours, As Needed - for mild pain   Flomax 0.4 mg oral capsule: 1 cap(s) orally once a day MDD:1  metoprolol: 12.5 milligram(s) orally once a day hsbp less 120  hhr less 60  oxyCODONE 5 mg oral tablet: 1 tab(s) orally every 6 hours, As Needed -for moderate pain MDD:4   Tylenol 500 mg oral tablet: 2 tab(s) orally every 6 hours, As Needed - for mild pain

## 2022-05-02 NOTE — PROGRESS NOTE ADULT - SUBJECTIVE AND OBJECTIVE BOX
[facility]  INFECTIOUS DISEASES   [address] [place] [address]  Tel: [phone]     Fax: [phone]  ======================================================  MD Jessica Alicea Kaushal, MD Cho, Michelle, MD   ======================================================    [id number]  FLOYD GONSALVES     Follow up: Bacteremia and acute appendicitis     No complaint, doing well, no fever, on regular diet tolerating well.   Still has the drain with less clear serosanguinous fluid.    PAST MEDICAL & SURGICAL HISTORY:  Gout  Enlarged prostate    No significant past surgical history    Social Hx:  No smoking, ETOH or drugs     FAMILY HISTORY: No pertinent medical history     Allergies  No Known Allergies    Antibiotics:  MEDICATIONS  (STANDING):  enoxaparin Injectable 40 milliGRAM(s) SubCutaneous every 24 hours  meropenem  IVPB 500 milliGRAM(s) IV Intermittent every 12 hours  pantoprazole  Injectable 40 milliGRAM(s) IV Push daily  senna 2 Tablet(s) Oral at bedtime  sodium chloride 0.9%. 1000 milliLiter(s) (50 mL/Hr) IV Continuous <Continuous>  tamsulosin 0.8 milliGRAM(s) Oral once     REVIEW OF SYSTEMS:  CONSTITUTIONAL:  No Fever or chills  HEENT:  No diplopia or blurred vision.  No sore throat or runny nose.  CARDIOVASCULAR:  No chest pain or SOB.  RESPIRATORY:  No cough, shortness of breath, PND or orthopnea.  GASTROINTESTINAL:  No nausea, vomiting or diarrhea.  GENITOURINARY:  No dysuria, frequency or urgency. No Blood in urine  MUSCULOSKELETAL:  no joint aches, no muscle pain  SKIN:  No change in skin, hair or nails.  NEUROLOGIC:  No paresthesias, fasciculations, seizures or weakness.  PSYCHIATRIC:  No disorder of thought or mood.  ENDOCRINE:  No heat or cold intolerance, polyuria or polydipsia.  HEMATOLOGICAL:  No easy bruising or bleeding.     Physical Exam:  Vital Signs Last 24 Hrs  T(C): 36.6 (02 May 2022 05:04), Max: 37.6 (01 May 2022 20:44)  T(F): 97.9 (02 May 2022 05:04), Max: 99.6 (01 May 2022 20:44)  HR: 74 (02 May 2022 05:04) (66 - 89)  BP: 127/71 (02 May 2022 05:04) (127/71 - 138/91)  BP(mean): --  RR: 18 (02 May 2022 05:04) (18 - 18)  SpO2: 94% (02 May 2022 05:04) (94% - 95%)  GEN: NAD  HEENT: normocephalic and atraumatic. EOMI. PERRL.    NECK: Supple.  No lymphadenopathy   LUNGS: Clear to auscultation.  HEART: Regular rate and rhythm without murmur.  ABDOMEN: Soft, nontender, and nondistended.  Positive bowel sounds.    Wounds look clean, drain in place with purulent discharge  : No CVA tenderness  EXTREMITIES: Without any cyanosis, clubbing, rash, lesions or edema.  NEUROLOGIC: grossly intact.  PSYCHIATRIC: Appropriate affect .  SKIN: No ulceration or induration present.    Labs:                        13.4   9.95  )-----------( 220      ( 02 May 2022 07:30 )             42.2     05-02    139  |  106  |  50<H>  ----------------------------<  69<L>  3.9   |  25  |  2.40<H>    Ca    9.4      02 May 2022 07:30  Phos  2.1     05-02  Mg     2.2     05-01    Culture - Blood (collected 04-29-22 @ 16:15)  Source: .Blood Blood-Peripheral    Culture - Blood (collected 04-29-22 @ 16:15)  Source: .Blood Blood-Peripheral    Culture - Urine (collected 04-28-22 @ 22:12)  Source: Clean Catch Clean Catch (Midstream)  Final Report (05-01-22 @ 18:57):    >100,000 CFU/ml Enterobacter cloacae complex  Organism: Enterobacter cloacae complex (05-01-22 @ 18:57)  Organism: Enterobacter cloacae complex (05-01-22 @ 18:57)    Sensitivities:      -  Amikacin: S <=16      -  Amoxicillin/Clavulanic Acid: R >16/8      -  Ampicillin: R >16 These ampicillin results predict results for amoxicillin      -  Ampicillin/Sulbactam: R >16/8 Enterobacter, Klebsiella aerogenes, Citrobacter, and Serratia may develop resistance during prolonged therapy (3-4 days)      -  Aztreonam: S <=4      -  Cefazolin: R >16      -  Cefepime: S <=2      -  Cefoxitin: R >16      -  Ceftriaxone: S <=1 Enterobacter, Klebsiella aerogenes, Citrobacter, and Serratia may develop resistance during prolonged therapy      -  Ciprofloxacin: S <=0.25      -  Ertapenem: S <=0.5      -  Gentamicin: S <=2      -  Imipenem: S <=1      -  Levofloxacin: S <=0.5      -  Meropenem: S <=1      -  Nitrofurantoin: S <=32 Should not be used to treat pyelonephritis      -  Piperacillin/Tazobactam: S <=8      -  Tigecycline: S <=2      -  Tobramycin: S <=2      -  Trimethoprim/Sulfamethoxazole: S <=0.5/9.5      Method Type: MARGRET    Culture - Blood (collected 04-28-22 @ 18:00)  Source: .Blood Blood-Peripheral  Gram Stain (04-29-22 @ 18:55):    Growth in aerobic bottle:    Gram Negative Rods    Growth in anaerobic bottle: Gram Negative Rods  Final Report (05-01-22 @ 08:54):    Growth in aerobic bottle: Pseudomonas aeruginosa    Growth in aerobic and anaerobic bottles: Escherichia coli ESBL    See previous culture [id number]  Organism: Pseudomonas aeruginosa (05-01-22 @ 08:54)  Organism: Pseudomonas aeruginosa (05-01-22 @ 08:54)    Sensitivities:      -  Amikacin: S <=16      -  Aztreonam: S <=4      -  Cefepime: S 4      -  Ceftazidime: S <=1      -  Ciprofloxacin: S 0.5      -  Gentamicin: S 4      -  Imipenem: S 2      -  Levofloxacin: S <=0.5      -  Meropenem: S <=1      -  Piperacillin/Tazobactam: S <=8      -  Tobramycin: S <=2      Method Type: MARGRET    Culture - Blood (collected 04-28-22 @ 18:00)  Source: .Blood Blood-Peripheral  Gram Stain (04-29-22 @ 12:17):    Growth in aerobic bottle: Gram Variable Rods    Growth in anaerobic bottle: Gram Negative Rods  Final Report (05-01-22 @ 08:05):    Growth in aerobic and anaerobic bottles: Escherichia coli ESBL    ***Blood Panel PCR results on this specimen are available    approximately 3 hours after the Gram stain result.***    Gram stain, PCR, and/or culture results may not always    correspond dueto difference in methodologies.    ************************************************************    This PCR assay was performed by multiplex PCR. This    Assay tests for 66 bacterial and resistance gene targets.    Please refer to the [facility] test directory    at [email or web address] for details.  Organism: Blood Culture PCR  Escherichia coli ESBL (05-01-22 @ 08:05)  Organism: Escherichia coli ESBL (05-01-22 @ 08:05)    Sensitivities:      -  Amikacin: S <=16      -  Ampicillin: R >16 These ampicillin results predict results for amoxicillin      -  Ampicillin/Sulbactam: R <=4/2 Enterobacter, Klebsiella aerogenes, Citrobacter, and Serratia may develop resistance during prolonged therapy (3-4 days)      -  Aztreonam: R >16      -  Cefazolin: R >16 Enterobacter, Klebsiella aerogenes, Citrobacter, and Serratia may develop resistance during prolonged therapy (3-4 days)      -  Cefepime: R 8      -  Ceftriaxone: R >32 Enterobacter, Klebsiella aerogenes, Citrobacter, and Serratia may develop resistance during prolonged therapy      -  Ciprofloxacin: R >2      -  Ertapenem: S <=0.5      -  Gentamicin: S <=2      -  Imipenem: S <=1      -  Levofloxacin: R >4      -  Meropenem: S <=1      -  Piperacillin/Tazobactam: R <=8      -  Tobramycin: S <=2      -  Trimethoprim/Sulfamethoxazole: S <=0.5/9.5      Method Type: MARGRET  Organism: Blood Culture PCR (05-01-22 @ 08:05)    Sensitivities:      -  CTX-M Resistance Marker: Detec      -  ESBL: Detec      -  Escherichia coli: Detec      Method Type: PCR    WBC Count: 9.95 K/uL (05-02-22 @ 07:30)  WBC Count: 9.89 K/uL (05-01-22 @ 07:40)  WBC Count: 15.34 K/uL (04-30-22 @ 06:34)  WBC Count: 20.26 K/uL (04-29-22 @ 04:36)  WBC Count: 7.75 K/uL (04-28-22 @ 13:04)    Creatinine, Serum: 2.40 mg/dL (05-02-22 @ 07:30)  Creatinine, Serum: 2.40 mg/dL (05-01-22 @ 07:40)  Creatinine, Serum: 2.70 mg/dL (04-30-22 @ 06:34)  Creatinine, Serum: 2.70 mg/dL (04-29-22 @ 04:36)  Creatinine, Serum: 2.50 mg/dL (04-28-22 @ 13:04)     COVID-19 PCR: NotDetec (04-28-22 @ 13:04)    All imaging and other data have been reviewed.  < from: CT Abdomen and Pelvis No Cont (04.28.22 @ 14:22) >  ACC: [id number] EXAM:  CT ABDOMEN AND PELVIS                        PROCEDURE DATE:  04/28/2022    INTERPRETATION:  CLINICAL INFORMATION: Right lower quadrant abdominal   pain, radiating to groin.  COMPARISON: None.  CONTRAST/COMPLICATIONS:  IV Contrast: NONE  Oral Contrast: NONE  Complications: None reported at time of study completion  PROCEDURE:  CT of the Abdomen and Pelvis was performed.  Sagittal and coronal reformats were performed.  FINDINGS:  LOWER CHEST:  Mild dependent bibasilar atelectatic changes.  The evaluation of the solid organ parenchyma is limited without   intravenous contrast.  LIVER: Within normal limits.  BILE DUCTS: Normal caliber.  GALLBLADDER: Within normal limits.  SPLEEN: Borderline enlarged.  PANCREAS: Within normal limits.  ADRENALS: Within normal limits.  KIDNEYS/URETERS:  Bilateral renal cysts and several small indeterminate hypodense right   renal lesions.  Small hyperdense lesion medial interpolar left kidney.  Atrophic right kidney.  Prominent right extrarenal pelvis.  Mild left-sided hydroureteronephrosis, without obstructing ureteral   calculus.  BLADDER: Small focus of air within the urinary bladder, correlate   clinically for recent instrumentation versus infection.  REPRODUCTIVE ORGANS: Markedly enlarged prostate gland with intravesicular   extension.  BOWEL: Small hiatal hernia.  No bowel obstruction.  Dilated appendix, measuring up to 2 cm, with 1 cm calcified appendicolith   at base and 1.4 cm calcified appendicolith within body. There is   significant periappendiceal stranding, with inflammatory stranding   extending along the right posterior flank and pelvis.  There is thickening at the cecal apex.  PERITONEUM: No ascites.  No localized intra-abdominal fluid collection or pneumoperitoneum noted.  VESSELS: Within normal limits.  RETROPERITONEUM/LYMPH NODES: No lymphadenopathy.  ABDOMINAL WALL: Small bilateral fat-containing inguinal hernias.  BONES: Within normal limits.  IMPRESSION:  CT findings compatible with acute appendicitis as discussed above.  Prostatomegaly.    Assessment and Plan:   68 yo man with PMH of BPH and CKD/GABBY was admitted with abdominal pain for one day. CT consistent with Acute appendicitis so taken to OR for Lap appendectomy on 4/28.   Today Blood culture is back positive for ESBL ecoli.   Most likely due to perforated appendicitis, even though has an enlarged Prostate but he is asymptomatic.     Bacteremia   - Blood culture with ESBL Ecoli and pseudomonas 4/28  - Repeat blood cultures NGTD 4/29  - Continue Meropenem 500mg q12  - Follow Creat to adjust ABx  - Trend WBC, today 20k-->9k  - Continue castillo as per  for urinary retention   - Place Midline, will need 2 weeks of antibiotics, last day would be 5/12     Will follow.    Natacha Kramer MD  Division of Infectious Diseases   Please call ID service at [phone] with any question.      35 minutes spent on total encounter assessing patient, examination, chart reivew, counseling and coordinating care by the attending physician/nurse/care manager.

## 2022-05-02 NOTE — PROGRESS NOTE ADULT - PROBLEM SELECTOR PLAN 1
sp appendectomy with post bacteremia  + blood cx ecoli/pseudomonas  complete iv abxs per id midline until may 12  fu urology and nephrology for renal insuff sp appendectomy with post bacteremia  + blood cx ecoli/pseudomonas  complete iv abxs per id midline until may 12 at home  fu urology and nephrology for renal insuff

## 2022-05-02 NOTE — PROGRESS NOTE ADULT - SUBJECTIVE AND OBJECTIVE BOX
SUBJECTIVE:  Patient seen and examined at bedside. POD# 4 s/p laparoscopic appendectomy. No overnight events. Pt wishing to go home but is understanding of the nature of his admission. Admits to flatus, last BM stated to be on Thursday. Ambulating and tolerating regular diet. Gavin catheter in place.  Patient denies any fever, chills, chest pain, shortness of breath, nausea, vomiting, or urinary complaints.    VITALS  Vital Signs Last 24 Hrs  T(C): 36.6 (02 May 2022 05:04), Max: 37.6 (01 May 2022 20:44)  T(F): 97.9 (02 May 2022 05:04), Max: 99.6 (01 May 2022 20:44)  HR: 74 (02 May 2022 05:04) (66 - 89)  BP: 127/71 (02 May 2022 05:04) (127/71 - 138/91)  BP(mean): --  RR: 18 (02 May 2022 05:04) (18 - 18)  SpO2: 94% (02 May 2022 05:04) (94% - 95%)    PHYSICAL EXAM  GENERAL:  Well-nourished, well-developed Male lying comfortably in bed in NAD.  CARDIO:  Regular rate and rhythm.  No murmur, gallop or rub appreciated.  RESPIRATORY:  Clear to auscultation bilaterally.  No wheezing, rales or rhonchi appreciated.  ABDOMEN:  Soft, nondistended, nontender, incisional sites noted to be well-approximated. Josué drain noted with scant serous fluid; BS appreciated on auscultation. No rebound tenderness or guarding.  EXTREMITIES: No calf tenderness bilaterally  SKIN:  No jaundice, pallor, or cyanosis  NEURO:  A&O x 3    INTAKE & OUTPUT  I&O's Summary    01 May 2022 07:01  -  02 May 2022 07:00  --------------------------------------------------------  IN: 900 mL / OUT: 5235 mL / NET: -4335 mL    I&O's Detail    01 May 2022 07:01  -  02 May 2022 07:00  --------------------------------------------------------  IN:    sodium chloride 0.45%: 900 mL  Total IN: 900 mL    OUT:    Bulb (mL): 35 mL    Indwelling Catheter - Urethral (mL): 5200 mL  Total OUT: 5235 mL    Total NET: -4335 mL    MEDICATIONS  MEDICATIONS  (STANDING):  enoxaparin Injectable 40 milliGRAM(s) SubCutaneous every 24 hours  meropenem  IVPB 500 milliGRAM(s) IV Intermittent every 12 hours  pantoprazole  Injectable 40 milliGRAM(s) IV Push daily  senna 2 Tablet(s) Oral at bedtime  sodium chloride 0.45%. 1000 milliLiter(s) (75 mL/Hr) IV Continuous <Continuous>  tamsulosin 0.8 milliGRAM(s) Oral once    MEDICATIONS  (PRN):  acetaminophen     Tablet .. 650 milliGRAM(s) Oral every 6 hours PRN Temp greater or equal to 38C (100.4F), Mild Pain (1 - 3)  HYDROmorphone  Injectable 0.5 milliGRAM(s) IV Push every 3 hours PRN Severe Pain (7 - 10)  melatonin 5 milliGRAM(s) Oral at bedtime PRN Insomnia  oxyCODONE    IR 5 milliGRAM(s) Oral every 4 hours PRN Moderate Pain (4 - 6)    LABS:                      13.4   9.95  )-----------( 220      ( 02 May 2022 07:30 )             42.2     05-02    139  |  106  |  50<H>  ----------------------------<  69<L>  3.9   |  25  |  2.40<H>    Ca    9.4      02 May 2022 07:30  Phos  2.1     05-02  Mg     2.2     05-01    Culture - Blood (collected 29 Apr 2022 16:15)  Source: .Blood Blood-Peripheral  Preliminary Report (30 Apr 2022 17:01):    No growth to date.    Culture - Blood (collected 29 Apr 2022 16:15)  Source: .Blood Blood-Peripheral  Preliminary Report (30 Apr 2022 17:01):    No growth to date.    ASSESSMENT & PLAN   67y Male POD# 4 s/p laparoscopic appendectomy.    - ID reccs appreciated. Will f/u ABX treatment duration.  - Continue regular diet  - Serial abdominal exams  - Is & Os  - DVT prophylaxis with Lovenox  - OOB, pain control  - Incentive spirometry  - Follow up AM labs  - Nephrology/Urology reccs appreciated  - Case to be discussed with Dr. Arellano SUBJECTIVE:  Patient seen and examined at bedside. POD# 4 s/p laparoscopic appendectomy. No overnight events. Pt wishing to go home but is understanding of the nature of his admission. Admits to flatus, last BM stated to be on Thursday. Ambulating and tolerating regular diet. Gavin catheter in place.  Patient denies any fever, chills, chest pain, shortness of breath, nausea, vomiting, or urinary complaints.    VITALS  Vital Signs Last 24 Hrs  T(C): 36.6 (02 May 2022 05:04), Max: 37.6 (01 May 2022 20:44)  T(F): 97.9 (02 May 2022 05:04), Max: 99.6 (01 May 2022 20:44)  HR: 74 (02 May 2022 05:04) (66 - 89)  BP: 127/71 (02 May 2022 05:04) (127/71 - 138/91)  BP(mean): --  RR: 18 (02 May 2022 05:04) (18 - 18)  SpO2: 94% (02 May 2022 05:04) (94% - 95%)    PHYSICAL EXAM  GENERAL:  Well-nourished, well-developed Male lying comfortably in bed in NAD.  CARDIO:  Regular rate and rhythm.  No murmur, gallop or rub appreciated.  RESPIRATORY:  Clear to auscultation bilaterally.  No wheezing, rales or rhonchi appreciated.  ABDOMEN:  Soft, nondistended, nontender, incisional sites noted to be well-approximated. Josué drain noted with scant serous fluid; BS appreciated on auscultation. No rebound tenderness or guarding.  EXTREMITIES: No calf tenderness bilaterally  SKIN:  No jaundice, pallor, or cyanosis  NEURO:  A&O x 3    INTAKE & OUTPUT  I&O's Summary    01 May 2022 07:01  -  02 May 2022 07:00  --------------------------------------------------------  IN: 900 mL / OUT: 5235 mL / NET: -4335 mL    I&O's Detail    01 May 2022 07:01  -  02 May 2022 07:00  --------------------------------------------------------  IN:    sodium chloride 0.45%: 900 mL  Total IN: 900 mL    OUT:    Bulb (mL): 35 mL    Indwelling Catheter - Urethral (mL): 5200 mL  Total OUT: 5235 mL    Total NET: -4335 mL    MEDICATIONS  MEDICATIONS  (STANDING):  enoxaparin Injectable 40 milliGRAM(s) SubCutaneous every 24 hours  meropenem  IVPB 500 milliGRAM(s) IV Intermittent every 12 hours  pantoprazole  Injectable 40 milliGRAM(s) IV Push daily  senna 2 Tablet(s) Oral at bedtime  sodium chloride 0.45%. 1000 milliLiter(s) (75 mL/Hr) IV Continuous <Continuous>  tamsulosin 0.8 milliGRAM(s) Oral once    MEDICATIONS  (PRN):  acetaminophen     Tablet .. 650 milliGRAM(s) Oral every 6 hours PRN Temp greater or equal to 38C (100.4F), Mild Pain (1 - 3)  HYDROmorphone  Injectable 0.5 milliGRAM(s) IV Push every 3 hours PRN Severe Pain (7 - 10)  melatonin 5 milliGRAM(s) Oral at bedtime PRN Insomnia  oxyCODONE    IR 5 milliGRAM(s) Oral every 4 hours PRN Moderate Pain (4 - 6)    LABS:                      13.4   9.95  )-----------( 220      ( 02 May 2022 07:30 )             42.2     05-02    139  |  106  |  50<H>  ----------------------------<  69<L>  3.9   |  25  |  2.40<H>    Ca    9.4      02 May 2022 07:30  Phos  2.1     05-02  Mg     2.2     05-01    Culture - Blood (collected 29 Apr 2022 16:15)  Source: .Blood Blood-Peripheral  Preliminary Report (30 Apr 2022 17:01):    No growth to date.    Culture - Blood (collected 29 Apr 2022 16:15)  Source: .Blood Blood-Peripheral  Preliminary Report (30 Apr 2022 17:01):    No growth to date.    ASSESSMENT & PLAN   67y Male POD# 4 s/p laparoscopic appendectomy.    - ID reccs appreciated. Midline catheter today. Will f/u ABX treatment duration.  - Continue regular diet  - Serial abdominal exams  - Is & Os  - DVT prophylaxis with Lovenox  - OOB, pain control  - Incentive spirometry  - Follow up AM labs  - Nephrology/Urology reccs appreciated  - Case to be discussed with Dr. Arellano

## 2022-05-02 NOTE — DISCHARGE NOTE PROVIDER - NSDCFUADDINST_GEN_ALL_CORE_FT
VNS for every 12 hour antibiotics- Has right IJ in place.   Patient straight caths   JAISON drain emptying and recording daily, and as needed will need to bring log to office.   Do not pin JAISON to outer clothing, may pin to undergarments.   Sponge bathe only.   regular ambulation  Continue deep breathing exercises, incentive spirometer.      VNS for every 12 hour antibiotics- Has right IJ in place.   Patient straight caths   JAISON drain emptying and recording daily, and as needed will need to bring log to office.   Do not pin JAISON to outer clothing, may pin to undergarments.   Sponge bathe only.   regular ambulation  Continue deep breathing exercises, incentive spirometer.   If having severe pain, please contact Dr Arellano office for instruction     VNS for every 12 hour antibiotics- Has right IJ in place.   Patient straight caths   JAISON drain emptying and recording daily, and as needed will need to bring log to office.   Do not pin JAISON to outer clothing, may pin to undergarments.   Sponge bathe only.   Regular ambulation.  Continue deep breathing exercises, incentive spirometer.     You may take Tylenol two 500 mg tablets for mild pain.  You may take 1 tablet Oxycodone for moderate pain - will be sent to your pharmacy. Do NOT drive while taking narcotics for pain. Take an over the counter stool softener for narcotic induced constipation.  If having severe pain, please contact Dr Arellano' office for instruction.

## 2022-05-02 NOTE — PHARMACOTHERAPY INTERVENTION NOTE - COMMENTS
Patient is 67y M presenting with polymicrobial bacteremia with Pseudomonas and ESBL E. coli. Per pharmacy policy, based on the patient's renal function (SCr = 2.4, eGFR = 29 mL/min, CrCl = 34 mL/min) automatically adjusted dose of meropenem from 500 mg IV Q12 to 1000 mg IV Q12.    Theresa Mishra, PharmD  Clinical Pharmacy Specialist [id number]

## 2022-05-02 NOTE — DISCHARGE NOTE PROVIDER - NSDCCPCAREPLAN_GEN_ALL_CORE_FT
PRINCIPAL DISCHARGE DIAGNOSIS  Diagnosis: Acute appendicitis  Assessment and Plan of Treatment:       SECONDARY DISCHARGE DIAGNOSES  Diagnosis: Acute UTI  Assessment and Plan of Treatment:

## 2022-05-02 NOTE — PROGRESS NOTE ADULT - SUBJECTIVE AND OBJECTIVE BOX
PAST MEDICAL & SURGICAL HISTORY:  Gout    Enlarged prostate    No significant past surgical history        MEDICATIONS  (STANDING):  chlorhexidine 4% Liquid 1 Application(s) Topical <User Schedule>  enoxaparin Injectable 40 milliGRAM(s) SubCutaneous every 24 hours  meropenem  IVPB 1000 milliGRAM(s) IV Intermittent every 12 hours  metoprolol tartrate 12.5 milliGRAM(s) Oral daily  pantoprazole  Injectable 40 milliGRAM(s) IV Push daily  potassium phosphate / sodium phosphate Tablet (K-PHOS No. 2) 1 Tablet(s) Oral three times a day  senna 2 Tablet(s) Oral at bedtime  sodium chloride 0.45%. 1000 milliLiter(s) (75 mL/Hr) IV Continuous <Continuous>  tamsulosin 0.8 milliGRAM(s) Oral once    MEDICATIONS  (PRN):  acetaminophen     Tablet .. 650 milliGRAM(s) Oral every 6 hours PRN Temp greater or equal to 38C (100.4F), Mild Pain (1 - 3)  HYDROmorphone  Injectable 0.5 milliGRAM(s) IV Push every 3 hours PRN Severe Pain (7 - 10)  melatonin 5 milliGRAM(s) Oral at bedtime PRN Insomnia  oxyCODONE    IR 5 milliGRAM(s) Oral every 4 hours PRN Moderate Pain (4 - 6)  sodium chloride 0.9% lock flush 10 milliLiter(s) IV Push every 1 hour PRN Pre/post blood products, medications, blood draw, and to maintain line patency      Patient is a 67y old  Male who presents with a chief complaint of acute appendicitis (02 May 2022 15:11)      Vital Signs Last 24 Hrs  T(C): 36.8 (02 May 2022 12:29), Max: 37.6 (01 May 2022 20:44)  T(F): 98.2 (02 May 2022 12:29), Max: 99.6 (01 May 2022 20:44)  HR: 67 (02 May 2022 12:29) (67 - 89)  BP: 145/79 (02 May 2022 12:29) (127/71 - 145/79)  BP(mean): --  RR: 18 (02 May 2022 12:29) (18 - 18)  SpO2: 96% (02 May 2022 12:29) (94% - 96%)          05-01 @ 07:01  -  05-02 @ 07:00  --------------------------------------------------------  IN: 900 mL / OUT: 5235 mL / NET: -4335 mL    05-02 @ 07:01  - 05-02 @ 20:19  --------------------------------------------------------  IN: 0 mL / OUT: 805 mL / NET: -805 mL        REVIEW OF SYSTEMS:    Constitutional: No fever, weight loss or fatigue  Eyes: No eye pain, visual disturbances, or discharge  ENT:  No difficulty hearing, tinnitus, vertigo; No sinus or throat pain  Neck: No pain or stiffness  Breasts: No pain, masses or nipple discharge  Respiratory: No cough, wheezing, chills or hemoptysis  Cardiovascular: No chest pain, palpitations, shortness of breath, dizziness or leg swelling  Gastrointestinal: No abdominal or epigastric pain. No nausea, vomiting or hematemesis; No diarrhea or constipation. No melena or hematochezia.  Genitourinary: No dysuria, frequency, hematuria or incontinence  Rectal: No pain, hemorrhoids or incontinence  Neurological: No headaches, memory loss, loss of strength, numbness or tremors  Skin: No itching, burning, rashes or lesions   Lymph Nodes: No enlarged glands  Endocrine: No heat or cold intolerance; No hair loss  Musculoskeletal: No joint pain or swelling; No muscle, back or extremity pain  Psychiatric: No depression, anxiety, mood swings or difficulty sleeping  Heme/Lymph: No easy bruising or bleeding gums  Allergy and Immunologic: No hives or eczema    PHYSICAL EXAM:  alert  in no distress  Respiratory: CTAB/L   Cardiovascular: S1 and S2, RRR, no M/G/R  Gastrointestinal: soft  Extremities: No peripheral edema  Neurological: A/O x 3, no focal deficits  Skin: No rashes      decubiti: no                          13.4   9.95  )-----------( 220      ( 02 May 2022 07:30 )             42.2     05-02    139  |  106  |  50<H>  ----------------------------<  69<L>  3.9   |  25  |  2.40<H>    Ca    9.4      02 May 2022 07:30  Phos  2.1     05-02  Mg     2.2     05-01                .Blood Blood-Peripheral  04-29 @ 16:15   No growth to date.  --  --      Clean Catch Clean Catch (Midstream)  04-28 @ 22:12   >100,000 CFU/ml Enterobacter cloacae complex  --  Enterobacter cloacae complex      .Blood Blood-Peripheral  04-28 @ 18:00   Growth in aerobic and anaerobic bottles: Escherichia coli ESBL  ***Blood Panel PCR results on this specimen are available  approximately 3 hours after the Gram stain result.***  Gram stain, PCR, and/or culture results may not always  correspond dueto difference in methodologies.  ************************************************************  This PCR assay was performed by multiplex PCR. This  Assay tests for 66 bacterial and resistance gene targets.  Please refer to the [facility] test directory  at [email or web address] for details.  --  Blood Culture PCR  Escherichia coli ESBL        Urine Culture:  04-29 @ 16:15    --        No growth to date.  Urine Culture:  04-28 @ 22:12    --        >100,000 CFU/ml Enterobacter cloacae complex  Urine Culture:  04-28 @ 18:00    --        Growth in aerobic and anaerobic bottles: Escherichia coli ESBL  ***Blood Panel PCR results on this specimen are available  approximately 3 hours after the Gram stain result.***  Gram stain, PCR, and/or culture results may not always  correspond dueto difference in methodologies.  ************************************************************  This PCR assay was performed by multiplex PCR. This  Assay tests for 66 bacterial and resistance gene targets.  Please refer to the [facility] test directory  at [email or web address] for details.        Radiology:

## 2022-05-02 NOTE — DISCHARGE NOTE PROVIDER - CARE PROVIDER_API CALL
Mj Arellano)  Surgery  [address], [place] [id number]  Phone: [phone]  Fax: [phone]  Follow Up Time:     Rashaun Palumbo)  Urology  [address] [place] [address], [address]  [place] [address]  Phone: [phone]  Fax: [phone]  Follow Up Time:

## 2022-05-02 NOTE — DISCHARGE NOTE PROVIDER - CARE PROVIDERS DIRECT ADDRESSES
[email or web address]

## 2022-05-02 NOTE — PROGRESS NOTE ADULT - SUBJECTIVE AND OBJECTIVE BOX
Patient is a 67y Male whom presented to the hospital with ckd and aaron     PAST MEDICAL & SURGICAL HISTORY:  Gout    Enlarged prostate    No significant past surgical history        MEDICATIONS  (STANDING):  acetaminophen   IVPB .. 1000 milliGRAM(s) IV Intermittent once  piperacillin/tazobactam IVPB.. 3.375 Gram(s) IV Intermittent every 8 hours  sodium chloride 0.9%. 1000 milliLiter(s) (75 mL/Hr) IV Continuous <Continuous>  tamsulosin 0.8 milliGRAM(s) Oral once      Allergies    No Known Allergies    Intolerances        SOCIAL HISTORY:  Denies ETOh,Smoking,     FAMILY HISTORY:      REVIEW OF SYSTEMS:    CONSTITUTIONAL: No weakness, fevers or chills  RESPIRATORY: No cough, wheezing, hemoptysis; No shortness of breath  CARDIOVASCULAR: No chest pain or palpitations  GASTROINTESTINAL: pos  abdominal ,  epigastric pain. No nausea, vomiting,     No diarrhea or constipation. No melena   GENITOURINARY: No dysuria, frequency or hematuria                                              13.4   9.95  )-----------( 220      ( 02 May 2022 07:30 )             42.2       CBC Full  -  ( 02 May 2022 07:30 )  WBC Count : 9.95 K/uL  RBC Count : 4.77 M/uL  Hemoglobin : 13.4 g/dL  Hematocrit : 42.2 %  Platelet Count - Automated : 220 K/uL  Mean Cell Volume : 88.5 fl  Mean Cell Hemoglobin : 28.1 pg  Mean Cell Hemoglobin Concentration : 31.8 gm/dL  Auto Neutrophil # : x  Auto Lymphocyte # : x  Auto Monocyte # : x  Auto Eosinophil # : x  Auto Basophil # : x  Auto Neutrophil % : x  Auto Lymphocyte % : x  Auto Monocyte % : x  Auto Eosinophil % : x  Auto Basophil % : x      05-02    139  |  106  |  50<H>  ----------------------------<  69<L>  3.9   |  25  |  2.40<H>    Ca    9.4      02 May 2022 07:30  Phos  2.1     05-02  Mg     2.2     05-01        CAPILLARY BLOOD GLUCOSE          Vital Signs Last 24 Hrs  T(C): 36.8 (02 May 2022 12:29), Max: 37.6 (01 May 2022 20:44)  T(F): 98.2 (02 May 2022 12:29), Max: 99.6 (01 May 2022 20:44)  HR: 67 (02 May 2022 12:29) (67 - 89)  BP: 145/79 (02 May 2022 12:29) (127/71 - 145/79)  BP(mean): --  RR: 18 (02 May 2022 12:29) (18 - 18)  SpO2: 96% (02 May 2022 12:29) (94% - 96%)              PHYSICAL EXAM:    Constitutional: NAD  HEENT: conjunctive   clear   Neck:  No JVD  Respiratory: CTAB  Cardiovascular: S1 and S2  Gastrointestinal: BS+, soft, positive Tender /ND  Extremities: No peripheral edema          MEDICATIONS  (STANDING):  acetaminophen   IVPB .. 1000 milliGRAM(s) IV Intermittent once  piperacillin/tazobactam IVPB.. 3.375 Gram(s) IV Intermittent every 8 hours  sodium chloride 0.9%. 1000 milliLiter(s) (75 mL/Hr) IV Continuous <Continuous>  tamsulosin 0.8 milliGRAM(s) Oral once

## 2022-05-02 NOTE — DISCHARGE NOTE PROVIDER - HOSPITAL COURSE
HPI: 66 YO Male w/ PMHx of dry mouth, BPH (self-catheterizes), ?renal failure p/w abdominal pain x 1 day. Patient seen in ED last night but apparently refused further work-up. Patient returned to ED today c/o RLQ pain radiating to the groin, N/V, chills and subjective fever since last night. Admits to diarrhea a few days ago, which has resolved. Patient has not eaten since yesterday. Denies chest pain, SOB, hematochezia, hematemesis, constipation, past surgical history. Patient had a colonoscopy >10 yrs ago, but cannot recall the results. Of note, patient recently started seeing Dr. Montes and was last seen 2 weeks ago where he was referred to a nephrologist due to concern for renal failure and need for dialysis, but patient could not recall exactly who he saw and where he went.     In the ED: A CT scan of the abdomen and pelvis without HPI: 66 YO Male w/ PMHx of dry mouth, BPH (self-catheterizes), ?renal failure p/w abdominal pain x 1 day. Patient seen in ED last night but apparently refused further work-up. Patient returned to ED today c/o RLQ pain radiating to the groin, N/V, chills and subjective fever since last night. Admits to diarrhea a few days ago, which has resolved. Patient has not eaten since yesterday. Denies chest pain, SOB, hematochezia, hematemesis, constipation, past surgical history. Patient had a colonoscopy >10 yrs ago, but cannot recall the results. Of note, patient recently started seeing Dr. Montes and was last seen 2 weeks ago where he was referred to a nephrologist due to concern for renal failure and need for dialysis, but patient could not recall exactly who he saw and where he went.     In the ED: A CT scan of the abdomen and pelvis without oral/IV contrast was obtained which revealed findings concerning for acute appendicitis HPI: 66 YO Male w/ PMHx of dry mouth, BPH (self-catheterizes), ?renal failure p/w abdominal pain x 1 day. Patient seen in ED last night but apparently refused further work-up. Patient returned to ED today c/o RLQ pain radiating to the groin, N/V, chills and subjective fever since last night. Admits to diarrhea a few days ago, which has resolved. Patient has not eaten since yesterday. Denies chest pain, SOB, hematochezia, hematemesis, constipation, past surgical history. Patient had a colonoscopy >10 yrs ago, but cannot recall the results. Of note, patient recently started seeing Dr. Montes and was last seen 2 weeks ago where he was referred to a nephrologist due to concern for renal failure and need for dialysis, but patient could not recall exactly who he saw and where he went.     In the ED: A CT scan of the abdomen and pelvis without oral/IV contrast was obtained which revealed findings concerning for acute appendicitis, as follows: "Dilated appendix, measuring up to 2 cm, with 1 cm calcified appendicolith at base and 1.4 cm calcified appendicolith within body. There is significant periappendiceal stranding, with inflammatory stranding extending along the right posterior flank and pelvis. There is thickening at the cecal apex." Dr. Arellano saw the patient in the ED and explained the risks and benefits of undergoing an appendectomy HPI: 68 YO Male w/ PMHx of dry mouth, BPH (self-catheterizes), ?renal failure p/w abdominal pain x 1 day. Patient seen in ED last night but apparently refused further work-up. Patient returned to ED today c/o RLQ pain radiating to the groin, N/V, chills and subjective fever since last night. Admits to diarrhea a few days ago, which has resolved. Patient has not eaten since yesterday. Denies chest pain, SOB, hematochezia, hematemesis, constipation, past surgical history. Patient had a colonoscopy >10 yrs ago, but cannot recall the results. Of note, patient recently started seeing Dr. Montes and was last seen 2 weeks ago where he was referred to a nephrologist due to concern for renal failure and need for dialysis, but patient could not recall exactly who he saw and where he went.     In the ED: A CT scan of the abdomen and pelvis without oral/IV contrast was obtained which revealed findings concerning for acute appendicitis, as follows: "Dilated appendix, measuring up to 2 cm, with 1 cm calcified appendicolith at base and 1.4 cm calcified appendicolith within body. There is significant periappendiceal stranding, with inflammatory stranding extending along the right posterior flank and pelvis. There is thickening at the cecal apex." Dr. Arellano saw the patient in the ED and explained the risks and benefits of undergoing an appendectomy    Pt taken to OR for lap appendectomy.  Pt tolerated procedure well, and recovered in PACU.  Once recovery criteria met, was taken to the floor.  Pt remained in house for IV antibiotics due to feculent peritonitis from ruptured appendix. Pts diet was slowly advanced from clears to regular.  GI function returned.  Gavin has remained in place, until day of discharge.  Pt was able to void in discharge day.  Will follow up outpatient with private urologist.  On discharge day Right IJ placed, for need of hemodialysis outpatient.  Currently doing well, stable for discharge.     DISPO:  Will follow up outpatient with Dr. Arellano for drain removal.

## 2022-05-02 NOTE — DISCHARGE NOTE PROVIDER - NSDCFUADDAPPT_GEN_ALL_CORE_FT
Please follow up with Dr. Arellano in 1 week, please call to schedule  Please follow up with Dr. Palumbo in 3 weeks, please call to schedule  Call to schedule an appointment with IR in 2 weeks, please call to schedule- [phone], for catheter removal (after May 12, so course of antibiotics will be completed prior to)   Please follow up with Dr. Arellano in 1 week, please call to schedule.  Please follow up with Dr. Palumbo in 3 weeks, please call to schedule.  Call to schedule an appointment with IR in 2 weeks, please call to schedule- [phone], for catheter removal (after May 12, so course of antibiotics will be completed prior to).

## 2022-05-03 ENCOUNTER — TRANSCRIPTION ENCOUNTER (OUTPATIENT)
Age: 67
End: 2022-05-03

## 2022-05-03 VITALS
OXYGEN SATURATION: 95 % | SYSTOLIC BLOOD PRESSURE: 129 MMHG | TEMPERATURE: 98 F | DIASTOLIC BLOOD PRESSURE: 77 MMHG | RESPIRATION RATE: 18 BRPM

## 2022-05-03 LAB
ANION GAP SERPL CALC-SCNC: 8 MMOL/L — SIGNIFICANT CHANGE UP (ref 5–17)
BUN SERPL-MCNC: 48 MG/DL — HIGH (ref 7–23)
CALCIUM SERPL-MCNC: 9.6 MG/DL — SIGNIFICANT CHANGE UP (ref 8.5–10.1)
CHLORIDE SERPL-SCNC: 105 MMOL/L — SIGNIFICANT CHANGE UP (ref 96–108)
CO2 SERPL-SCNC: 26 MMOL/L — SIGNIFICANT CHANGE UP (ref 22–31)
CREAT SERPL-MCNC: 2.3 MG/DL — HIGH (ref 0.5–1.3)
EGFR: 31 ML/MIN/1.73M2 — LOW
GLUCOSE SERPL-MCNC: 78 MG/DL — SIGNIFICANT CHANGE UP (ref 70–99)
HCT VFR BLD CALC: 41.3 % — SIGNIFICANT CHANGE UP (ref 39–50)
HGB BLD-MCNC: 13.8 G/DL — SIGNIFICANT CHANGE UP (ref 13–17)
MAGNESIUM SERPL-MCNC: 2.2 MG/DL — SIGNIFICANT CHANGE UP (ref 1.6–2.6)
MCHC RBC-ENTMCNC: 28.6 PG — SIGNIFICANT CHANGE UP (ref 27–34)
MCHC RBC-ENTMCNC: 33.4 GM/DL — SIGNIFICANT CHANGE UP (ref 32–36)
MCV RBC AUTO: 85.7 FL — SIGNIFICANT CHANGE UP (ref 80–100)
NRBC # BLD: 0 /100 WBCS — SIGNIFICANT CHANGE UP (ref 0–0)
PHOSPHATE SERPL-MCNC: 3.2 MG/DL — SIGNIFICANT CHANGE UP (ref 2.5–4.5)
PLATELET # BLD AUTO: 241 K/UL — SIGNIFICANT CHANGE UP (ref 150–400)
POTASSIUM SERPL-MCNC: 3.9 MMOL/L — SIGNIFICANT CHANGE UP (ref 3.5–5.3)
POTASSIUM SERPL-SCNC: 3.9 MMOL/L — SIGNIFICANT CHANGE UP (ref 3.5–5.3)
RBC # BLD: 4.82 M/UL — SIGNIFICANT CHANGE UP (ref 4.2–5.8)
RBC # FLD: 14.8 % — HIGH (ref 10.3–14.5)
SODIUM SERPL-SCNC: 139 MMOL/L — SIGNIFICANT CHANGE UP (ref 135–145)
WBC # BLD: 12.07 K/UL — HIGH (ref 3.8–10.5)
WBC # FLD AUTO: 12.07 K/UL — HIGH (ref 3.8–10.5)

## 2022-05-03 PROCEDURE — 99233 SBSQ HOSP IP/OBS HIGH 50: CPT

## 2022-05-03 RX ORDER — MEROPENEM 1 G/30ML
1 INJECTION INTRAVENOUS
Qty: 0 | Refills: 0 | DISCHARGE

## 2022-05-03 RX ADMIN — PANTOPRAZOLE SODIUM 40 MILLIGRAM(S): 20 TABLET, DELAYED RELEASE ORAL at 11:33

## 2022-05-03 RX ADMIN — MEROPENEM 100 MILLIGRAM(S): 1 INJECTION INTRAVENOUS at 05:50

## 2022-05-03 RX ADMIN — Medication 12.5 MILLIGRAM(S): at 05:49

## 2022-05-03 RX ADMIN — CHLORHEXIDINE GLUCONATE 1 APPLICATION(S): 213 SOLUTION TOPICAL at 05:50

## 2022-05-03 NOTE — DIETITIAN INITIAL EVALUATION ADULT - PERTINENT LABORATORY DATA
05-03    139  |  105  |  48<H>  ----------------------------<  78  3.9   |  26  |  2.30<H>    Ca    9.6      03 May 2022 07:58  Phos  3.2     05-03  Mg     2.2     05-03

## 2022-05-03 NOTE — DISCHARGE NOTE NURSING/CASE MANAGEMENT/SOCIAL WORK - NSDCFUADDAPPT_GEN_ALL_CORE_FT
Please follow up with Dr. Arellano in 1 week, please call to schedule.  Please follow up with Dr. Palumbo in 3 weeks, please call to schedule.  Call to schedule an appointment with IR in 2 weeks, please call to schedule- [phone], for catheter removal (after May 12, so course of antibiotics will be completed prior to).

## 2022-05-03 NOTE — PROGRESS NOTE ADULT - SUBJECTIVE AND OBJECTIVE BOX
SUBJECTIVE:  Patient seen and examined at bedside. POD# 5 s/p laparoscopic appendectomy. No overnight events. Patient wishing to go home today. Patient reports no new complaints at this time.  Admits to flatus and BM. Voiding, ambulating and tolerating diet.  Patient denies any fever, chills, chest pain, shortness of breath, nausea, vomiting, or urinary complaints.    VITALS  Vital Signs Last 24 Hrs  T(C): 36.8 (03 May 2022 05:18), Max: 37.4 (02 May 2022 20:57)  T(F): 98.3 (03 May 2022 05:18), Max: 99.3 (02 May 2022 20:57)  HR: 61 (03 May 2022 05:18) (61 - 76)  BP: 130/70 (03 May 2022 05:18) (129/78 - 145/79)  BP(mean): --  RR: 18 (03 May 2022 05:18) (18 - 18)  SpO2: 94% (03 May 2022 05:18) (93% - 96%)    PHYSICAL EXAM  GENERAL:  Well-nourished, well-developed Male lying comfortably in bed in NAD.  CARDIO:  Regular rate and rhythm.  No murmur, gallop or rub appreciated.  RESPIRATORY:  Clear to auscultation bilaterally.  No wheezing, rales or rhonchi appreciated.  ABDOMEN:  Soft, nondistended, nontender. Josué drain in LLQ noted with scant serous fluid; BS appreciated on auscultation.  No rebound tenderness or guarding.  EXTREMITIES: No calf tenderness bilaterally  SKIN:  No jaundice, pallor, or cyanosis  NEURO:  A&O x 3    INTAKE & OUTPUT  I&O's Summary    02 May 2022 07:01  -  03 May 2022 07:00  --------------------------------------------------------  IN: 0 mL / OUT: 1925 mL / NET: -1925 mL      I&O's Detail    02 May 2022 07:01  -  03 May 2022 07:00  --------------------------------------------------------  IN:  Total IN: 0 mL    OUT:    Bulb (mL): 25 mL    Indwelling Catheter - Urethral (mL): 1900 mL  Total OUT: 1925 mL    Total NET: -1925 mL    MEDICATIONS  MEDICATIONS  (STANDING):  chlorhexidine 4% Liquid 1 Application(s) Topical <User Schedule>  enoxaparin Injectable 40 milliGRAM(s) SubCutaneous every 24 hours  meropenem  IVPB 1000 milliGRAM(s) IV Intermittent every 12 hours  metoprolol tartrate 12.5 milliGRAM(s) Oral daily  pantoprazole  Injectable 40 milliGRAM(s) IV Push daily  senna 2 Tablet(s) Oral at bedtime  sodium chloride 0.45%. 1000 milliLiter(s) (75 mL/Hr) IV Continuous <Continuous>  tamsulosin 0.8 milliGRAM(s) Oral once    MEDICATIONS  (PRN):  acetaminophen     Tablet .. 650 milliGRAM(s) Oral every 6 hours PRN Temp greater or equal to 38C (100.4F), Mild Pain (1 - 3)  HYDROmorphone  Injectable 0.5 milliGRAM(s) IV Push every 3 hours PRN Severe Pain (7 - 10)  melatonin 5 milliGRAM(s) Oral at bedtime PRN Insomnia  oxyCODONE    IR 5 milliGRAM(s) Oral every 4 hours PRN Moderate Pain (4 - 6)  sodium chloride 0.9% lock flush 10 milliLiter(s) IV Push every 1 hour PRN Pre/post blood products, medications, blood draw, and to maintain line patency    LABS:                      13.8   12.07 )-----------( 241      ( 03 May 2022 07:58 )             41.3     05-03    139  |  105  |  48<H>  ----------------------------<  78  3.9   |  26  |  2.30<H>    Ca    9.6      03 May 2022 07:58  Phos  3.2     05-03  Mg     2.2     05-03    ASSESSMENT & PLAN   67y Male POD# 5 s/p laparoscopic appendectomy    - Continue regular diet  - Gavin catheter removed. Patient usually issues self-catheterization at home. Urology is aware.  - Continue IV Meropenem out-patient via IJ until 5/12 as per ID  - Serial abdominal exams  - Is & Os  - DVT prophylaxis with Lovenox  - OOB, pain control  - Incentive spirometry  - Follow up AM labs  - Case to be discussed with Dr. Arellano

## 2022-05-03 NOTE — PROGRESS NOTE ADULT - SUBJECTIVE AND OBJECTIVE BOX
[facility]  INFECTIOUS DISEASES   [address] [place] [address]  Tel: [phone]     Fax: [phone]  ======================================================  MD Jessica Alicea Kaushal, MD Cho, Michelle, MD   ======================================================    [id number]  FLOYD GONSALVES     Follow up: Bacteremia and acute appendicitis     No complaint, doing well, no fever, on regular diet tolerating well.   Still has the drain. Has the tunneled catheter.     PAST MEDICAL & SURGICAL HISTORY:  Gout  Enlarged prostate    No significant past surgical history    Social Hx:  No smoking, ETOH or drugs     FAMILY HISTORY: No pertinent medical history     Allergies  No Known Allergies    Antibiotics:  MEDICATIONS  (STANDING):  enoxaparin Injectable 40 milliGRAM(s) SubCutaneous every 24 hours  meropenem  IVPB 500 milliGRAM(s) IV Intermittent every 12 hours  pantoprazole  Injectable 40 milliGRAM(s) IV Push daily  senna 2 Tablet(s) Oral at bedtime  sodium chloride 0.9%. 1000 milliLiter(s) (50 mL/Hr) IV Continuous <Continuous>  tamsulosin 0.8 milliGRAM(s) Oral once     REVIEW OF SYSTEMS:  CONSTITUTIONAL:  No Fever or chills  HEENT:  No diplopia or blurred vision.  No sore throat or runny nose.  CARDIOVASCULAR:  No chest pain or SOB.  RESPIRATORY:  No cough, shortness of breath, PND or orthopnea.  GASTROINTESTINAL:  No nausea, vomiting or diarrhea.  GENITOURINARY:  No dysuria, frequency or urgency. No Blood in urine  MUSCULOSKELETAL:  no joint aches, no muscle pain  SKIN:  No change in skin, hair or nails.  NEUROLOGIC:  No paresthesias, fasciculations, seizures or weakness.  PSYCHIATRIC:  No disorder of thought or mood.  ENDOCRINE:  No heat or cold intolerance, polyuria or polydipsia.  HEMATOLOGICAL:  No easy bruising or bleeding.     Physical Exam:  Vital Signs Last 24 Hrs  T(C): 36.8 (03 May 2022 05:18), Max: 37.4 (02 May 2022 20:57)  T(F): 98.3 (03 May 2022 05:18), Max: 99.3 (02 May 2022 20:57)  HR: 61 (03 May 2022 05:18) (61 - 76)  BP: 130/70 (03 May 2022 05:18) (129/78 - 145/79)  BP(mean): --  RR: 18 (03 May 2022 05:18) (18 - 18)  SpO2: 94% (03 May 2022 05:18) (93% - 96%)  GEN: NAD  HEENT: normocephalic and atraumatic. EOMI. PERRL.    NECK: Supple.  No lymphadenopathy   LUNGS: Clear to auscultation.  HEART: Regular rate and rhythm without murmur.  ABDOMEN: Soft, nontender, and nondistended.  Positive bowel sounds.    Wounds look clean, drain in place with purulent discharge  : No CVA tenderness  EXTREMITIES: Without any cyanosis, clubbing, rash, lesions or edema.  NEUROLOGIC: grossly intact.  PSYCHIATRIC: Appropriate affect .  SKIN: No ulceration or induration present.    Labs:                        13.8   12.07 )-----------( 241      ( 03 May 2022 07:58 )             41.3     05-03    139  |  105  |  48<H>  ----------------------------<  78  3.9   |  26  |  2.30<H>    Ca    9.6      03 May 2022 07:58  Phos  3.2     05-03  Mg     2.2     05-03    Culture - Blood (collected 04-29-22 @ 16:15)  Source: .Blood Blood-Peripheral    Culture - Blood (collected 04-29-22 @ 16:15)  Source: .Blood Blood-Peripheral    Culture - Urine (collected 04-28-22 @ 22:12)  Source: Clean Catch Clean Catch (Midstream)  Final Report (05-01-22 @ 18:57):    >100,000 CFU/ml Enterobacter cloacae complex  Organism: Enterobacter cloacae complex (05-01-22 @ 18:57)  Organism: Enterobacter cloacae complex (05-01-22 @ 18:57)    Sensitivities:      -  Amikacin: S <=16      -  Amoxicillin/Clavulanic Acid: R >16/8      -  Ampicillin: R >16 These ampicillin results predict results for amoxicillin      -  Ampicillin/Sulbactam: R >16/8 Enterobacter, Klebsiella aerogenes, Citrobacter, and Serratia may develop resistance during prolonged therapy (3-4 days)      -  Aztreonam: S <=4      -  Cefazolin: R >16      -  Cefepime: S <=2      -  Cefoxitin: R >16      -  Ceftriaxone: S <=1 Enterobacter, Klebsiella aerogenes, Citrobacter, and Serratia may develop resistance during prolonged therapy      -  Ciprofloxacin: S <=0.25      -  Ertapenem: S <=0.5      -  Gentamicin: S <=2      -  Imipenem: S <=1      -  Levofloxacin: S <=0.5      -  Meropenem: S <=1      -  Nitrofurantoin: S <=32 Should not be used to treat pyelonephritis      -  Piperacillin/Tazobactam: S <=8      -  Tigecycline: S <=2      -  Tobramycin: S <=2      -  Trimethoprim/Sulfamethoxazole: S <=0.5/9.5      Method Type: MARGRET    Culture - Blood (collected 04-28-22 @ 18:00)  Source: .Blood Blood-Peripheral  Gram Stain (04-29-22 @ 18:55):    Growth in aerobic bottle:    Gram Negative Rods    Growth in anaerobic bottle: Gram Negative Rods  Final Report (05-01-22 @ 08:54):    Growth in aerobic bottle: Pseudomonas aeruginosa    Growth in aerobic and anaerobic bottles: Escherichia coli ESBL    See previous culture [id number]  Organism: Pseudomonas aeruginosa (05-01-22 @ 08:54)  Organism: Pseudomonas aeruginosa (05-01-22 @ 08:54)    Sensitivities:      -  Amikacin: S <=16      -  Aztreonam: S <=4      -  Cefepime: S 4      -  Ceftazidime: S <=1      -  Ciprofloxacin: S 0.5      -  Gentamicin: S 4      -  Imipenem: S 2      -  Levofloxacin: S <=0.5      -  Meropenem: S <=1      -  Piperacillin/Tazobactam: S <=8      -  Tobramycin: S <=2      Method Type: MARGRET    Culture - Blood (collected 04-28-22 @ 18:00)  Source: .Blood Blood-Peripheral  Gram Stain (04-29-22 @ 12:17):    Growth in aerobic bottle: Gram Variable Rods    Growth in anaerobic bottle: Gram Negative Rods  Final Report (05-01-22 @ 08:05):    Growth in aerobic and anaerobic bottles: Escherichia coli ESBL    ***Blood Panel PCR results on this specimen are available    approximately 3 hours after the Gram stain result.***    Gram stain, PCR, and/or culture results may not always    correspond dueto difference in methodologies.    ************************************************************    This PCR assay was performed by multiplex PCR. This    Assay tests for 66 bacterial and resistance gene targets.    Please refer to the [facility] test directory    at [email or web address] for details.  Organism: Blood Culture PCR  Escherichia coli ESBL (05-01-22 @ 08:05)  Organism: Escherichia coli ESBL (05-01-22 @ 08:05)    Sensitivities:      -  Amikacin: S <=16      -  Ampicillin: R >16 These ampicillin results predict results for amoxicillin      -  Ampicillin/Sulbactam: R <=4/2 Enterobacter, Klebsiella aerogenes, Citrobacter, and Serratia may develop resistance during prolonged therapy (3-4 days)      -  Aztreonam: R >16      -  Cefazolin: R >16 Enterobacter, Klebsiella aerogenes, Citrobacter, and Serratia may develop resistance during prolonged therapy (3-4 days)      -  Cefepime: R 8      -  Ceftriaxone: R >32 Enterobacter, Klebsiella aerogenes, Citrobacter, and Serratia may develop resistance during prolonged therapy      -  Ciprofloxacin: R >2      -  Ertapenem: S <=0.5      -  Gentamicin: S <=2      -  Imipenem: S <=1      -  Levofloxacin: R >4      -  Meropenem: S <=1      -  Piperacillin/Tazobactam: R <=8      -  Tobramycin: S <=2      -  Trimethoprim/Sulfamethoxazole: S <=0.5/9.5      Method Type: MARGRET  Organism: Blood Culture PCR (05-01-22 @ 08:05)    Sensitivities:      -  CTX-M Resistance Marker: Detec      -  ESBL: Detec      -  Escherichia coli: Detec      Method Type: PCR    WBC Count: 12.07 K/uL (05-03-22 @ 07:58)  WBC Count: 9.95 K/uL (05-02-22 @ 07:30)  WBC Count: 9.89 K/uL (05-01-22 @ 07:40)  WBC Count: 15.34 K/uL (04-30-22 @ 06:34)  WBC Count: 20.26 K/uL (04-29-22 @ 04:36)  WBC Count: 7.75 K/uL (04-28-22 @ 13:04)    Creatinine, Serum: 2.30 mg/dL (05-03-22 @ 07:58)  Creatinine, Serum: 2.40 mg/dL (05-02-22 @ 07:30)  Creatinine, Serum: 2.40 mg/dL (05-01-22 @ 07:40)  Creatinine, Serum: 2.70 mg/dL (04-30-22 @ 06:34)  Creatinine, Serum: 2.70 mg/dL (04-29-22 @ 04:36)  Creatinine, Serum: 2.50 mg/dL (04-28-22 @ 13:04)    COVID-19 PCR: NotDetec (04-28-22 @ 13:04)    All imaging and other data have been reviewed.  < from: CT Abdomen and Pelvis No Cont (04.28.22 @ 14:22) >  ACC: [id number] EXAM:  CT ABDOMEN AND PELVIS                        PROCEDURE DATE:  04/28/2022    INTERPRETATION:  CLINICAL INFORMATION: Right lower quadrant abdominal   pain, radiating to groin.  COMPARISON: None.  CONTRAST/COMPLICATIONS:  IV Contrast: NONE  Oral Contrast: NONE  Complications: None reported at time of study completion  PROCEDURE:  CT of the Abdomen and Pelvis was performed.  Sagittal and coronal reformats were performed.  FINDINGS:  LOWER CHEST:  Mild dependent bibasilar atelectatic changes.  The evaluation of the solid organ parenchyma is limited without   intravenous contrast.  LIVER: Within normal limits.  BILE DUCTS: Normal caliber.  GALLBLADDER: Within normal limits.  SPLEEN: Borderline enlarged.  PANCREAS: Within normal limits.  ADRENALS: Within normal limits.  KIDNEYS/URETERS:  Bilateral renal cysts and several small indeterminate hypodense right   renal lesions.  Small hyperdense lesion medial interpolar left kidney.  Atrophic right kidney.  Prominent right extrarenal pelvis.  Mild left-sided hydroureteronephrosis, without obstructing ureteral   calculus.  BLADDER: Small focus of air within the urinary bladder, correlate   clinically for recent instrumentation versus infection.  REPRODUCTIVE ORGANS: Markedly enlarged prostate gland with intravesicular   extension.  BOWEL: Small hiatal hernia.  No bowel obstruction.  Dilated appendix, measuring up to 2 cm, with 1 cm calcified appendicolith   at base and 1.4 cm calcified appendicolith within body. There is   significant periappendiceal stranding, with inflammatory stranding   extending along the right posterior flank and pelvis.  There is thickening at the cecal apex.  PERITONEUM: No ascites.  No localized intra-abdominal fluid collection or pneumoperitoneum noted.  VESSELS: Within normal limits.  RETROPERITONEUM/LYMPH NODES: No lymphadenopathy.  ABDOMINAL WALL: Small bilateral fat-containing inguinal hernias.  BONES: Within normal limits.  IMPRESSION:  CT findings compatible with acute appendicitis as discussed above.  Prostatomegaly.    Assessment and Plan:   66 yo man with PMH of BPH and CKD/GABBY was admitted with abdominal pain for one day. CT consistent with Acute appendicitis so taken to OR for Lap appendectomy on 4/28.   Today Blood culture is back positive for ESBL ecoli.   Most likely due to perforated appendicitis, even though has an enlarged Prostate but he is asymptomatic.     Bacteremia   - Blood culture with ESBL Ecoli and pseudomonas 4/28  - Repeat blood cultures NGTD 4/29  - Continue Meropenem 500mg q12  - Follow Creat to adjust ABx  - Continue castillo as per  for urinary retention   - Midline in place will need 2 weeks of antibiotics, last day would be 5/12   - Once CBC and BMP in one week  - Region care has been informed about the order.   - Surgery follow up after discharge to remove the drain    Will sign off please call with any question.     Natacha Kramer MD  Division of Infectious Diseases   Please call ID service at [phone] with any question.      35 minutes spent on total encounter assessing patient, examination, chart reivew, counseling and coordinating care by the attending physician/nurse/care manager.

## 2022-05-03 NOTE — PROGRESS NOTE ADULT - ASSESSMENT
66 YO Male w/ PMHx of dry mouth, BPH (self-catheterizes), ?renal failure p/w abdominal pain x 1 day. Patient seen in ED last night but apparently refused further work-up. Patient returned to ED today c/o RLQ pain radiating to the groin, N/V, chills and subjective fever since last night. Admits to diarrhea a few days ago, which has resolved. Patient has not eaten since yesterday. Denies chest pain, SOB, hematochezia, hematemesis, constipation, past surgical history. Patient had a colonoscopy >10 yrs ago, but cannot recall the results. Of note, patient recently started seeing Dr. Montes and was last seen 2 weeks ago where he was referred to a nephrologist due to concern for renal failure and need for dialysis, but patient could not recall exactly who he saw and where he went.  (28 Apr 2022 16:31)      ckd stage 4 annd ACUTE RENAL FAILURE: sodium chloride 0.45%. 1000 milliLiter(s) (75 mL/Hr) IV Continuous  Serum creatinine is  at  2.4   , approximating GFR at   ml/min.   There is no progression . No uremic symptoms  No evidence of anemia .  Fluid status stable.  Will continue to avoid nephrotoxic drugs.  Patient remains asymptomatic.   Continue current therapy.    f/u blood and urine cx,serial lactate levels,monitor vitals closley,ivfs hydration,monitor urine output and renal profile,iv abx     BP monitoring,continue current antihypertensive meds, low salt diet,followup with PMD in 1-2 weeks    Urinary retention from markedly enlarged prostate.  f/u with urology   
  66 YO Male w/ PMHx of dry mouth, BPH (self-catheterizes), ?renal failure p/w abdominal pain x 1 day. Patient seen in ED last night but apparently refused further work-up. Patient returned to ED today c/o RLQ pain radiating to the groin, N/V, chills and subjective fever since last night. Admits to diarrhea a few days ago, which has resolved. Patient has not eaten since yesterday. Denies chest pain, SOB, hematochezia, hematemesis, constipation, past surgical history. Patient had a colonoscopy >10 yrs ago, but cannot recall the results. Of note, patient recently started seeing Dr. Montes and was last seen 2 weeks ago where he was referred to a nephrologist due to concern for renal failure and need for dialysis, but patient could not recall exactly who he saw and where he went.  (28 Apr 2022 16:31)      ckd stage 4 annd ACUTE RENAL FAILURE: sodium chloride 0.45%. 1000 milliLiter(s) (75 mL/Hr) IV Continuous  Serum creatinine is  at  2.5   , approximating GFR at   ml/min.   There is no progression . No uremic symptoms  No evidence of anemia .  Fluid status stable.  Will continue to avoid nephrotoxic drugs.  Patient remains asymptomatic.   Continue current therapy.  hold  diuretic.  hold   ACE inhibitor.  hold   ARB.  Additional evaluation:   ECG,    echocardiogram,     CXR,  will obtained recent   renal ultrasound to evalaute kidney size and possible stones ,if cr is not improving     ,send blood and urine cx,serial lactate levels,monitor vitals closley,ivfs hydration,monitor urine output and renal profile,iv abx     BP monitoring,continue current antihypertensive meds, low salt diet,followup with PMD in 1-2 weeks    Urinary retention from markedly enlarged prostate.  f/u with urology   
  66 YO Male w/ PMHx of dry mouth, BPH (self-catheterizes), ?renal failure p/w abdominal pain x 1 day. Patient seen in ED last night but apparently refused further work-up. Patient returned to ED today c/o RLQ pain radiating to the groin, N/V, chills and subjective fever since last night. Admits to diarrhea a few days ago, which has resolved. Patient has not eaten since yesterday. Denies chest pain, SOB, hematochezia, hematemesis, constipation, past surgical history. Patient had a colonoscopy >10 yrs ago, but cannot recall the results. Of note, patient recently started seeing Dr. Montes and was last seen 2 weeks ago where he was referred to a nephrologist due to concern for renal failure and need for dialysis, but patient could not recall exactly who he saw and where he went.  (28 Apr 2022 16:31)      ckd stage 4 annd ACUTE RENAL FAILURE: sodium chloride 0.9%. 1000 milliLiter(s) (75 mL/Hr) IV Continuous  Serum creatinine is  at  2.5   , approximating GFR at   ml/min.   There is no progression . No uremic symptoms  No evidence of anemia .  Fluid status stable.  Will continue to avoid nephrotoxic drugs.  Patient remains asymptomatic.   Continue current therapy.  hold  diuretic.  hold   ACE inhibitor.  hold   ARB.  Additional evaluation:   ECG,    echocardiogram,     CXR,  will obtained recent   renal ultrasound to evalaute kidney size and possible stones ,if cr is not improving     ,send blood and urine cx,serial lactate levels,monitor vitals closley,ivfs hydration,monitor urine output and renal profile,iv abx     BP monitoring,continue current antihypertensive meds, low salt diet,followup with PMD in 1-2 weeks    
Urine retention from markedly enlarged prostate  Continue castillo drainage while in hospital
  68 YO Male w/ PMHx of dry mouth, BPH (self-catheterizes), ?renal failure p/w abdominal pain x 1 day. Patient seen in ED last night but apparently refused further work-up. Patient returned to ED today c/o RLQ pain radiating to the groin, N/V, chills and subjective fever since last night. Admits to diarrhea a few days ago, which has resolved. Patient has not eaten since yesterday. Denies chest pain, SOB, hematochezia, hematemesis, constipation, past surgical history. Patient had a colonoscopy >10 yrs ago, but cannot recall the results. Of note, patient recently started seeing Dr. Montes and was last seen 2 weeks ago where he was referred to a nephrologist due to concern for renal failure and need for dialysis, but patient could not recall exactly who he saw and where he went.  (28 Apr 2022 16:31)      ckd stage 4 annd ACUTE RENAL FAILURE: sodium chloride 0.45%. 1000 milliLiter(s) (75 mL/Hr) IV Continuous  Serum creatinine is  at  2.3   , approximating GFR at   ml/min.   There is no progression . No uremic symptoms  No evidence of anemia .  Fluid status stable.  Will continue to avoid nephrotoxic drugs.  Patient remains asymptomatic.   Continue current therapy.    f/u blood and urine cx,serial lactate levels,monitor vitals closley,ivfs hydration,monitor urine output and renal profile      BP monitoring,continue current antihypertensive meds, low salt diet,followup with PMD in 1-2 weeks    Urinary retention from markedly enlarged prostate.  f/u with urology   
  68 YO Male w/ PMHx of dry mouth, BPH (self-catheterizes), ?renal failure p/w abdominal pain x 1 day. Patient seen in ED last night but apparently refused further work-up. Patient returned to ED today c/o RLQ pain radiating to the groin, N/V, chills and subjective fever since last night. Admits to diarrhea a few days ago, which has resolved. Patient has not eaten since yesterday. Denies chest pain, SOB, hematochezia, hematemesis, constipation, past surgical history. Patient had a colonoscopy >10 yrs ago, but cannot recall the results. Of note, patient recently started seeing Dr. Montes and was last seen 2 weeks ago where he was referred to a nephrologist due to concern for renal failure and need for dialysis, but patient could not recall exactly who he saw and where he went.  (28 Apr 2022 16:31)      ckd stage 4 annd ACUTE RENAL FAILURE: sodium chloride 0.45%. 1000 milliLiter(s) (75 mL/Hr) IV Continuous  Serum creatinine is  at  2.4   , approximating GFR at   ml/min.   There is no progression . No uremic symptoms  No evidence of anemia .  Fluid status stable.  Will continue to avoid nephrotoxic drugs.  Patient remains asymptomatic.   Continue current therapy.    f/u blood and urine cx,serial lactate levels,monitor vitals closley,ivfs hydration,monitor urine output and renal profile,iv abx     BP monitoring,continue current antihypertensive meds, low salt diet,followup with PMD in 1-2 weeks    Urinary retention from markedly enlarged prostate.  f/u with urology   
UTI with ESBL E. coli  Continue castillo while in hospital  Outpatient f/u with pt's urologist
  BRINA FLOYD VASQUEZ 67 m 4/28/2022 1955 DR SARAHI LOPEZ     REVIEW OF SYMPTOMS      Able to give ROS  Yes     RELIABLE +/-   CONSTITUTIONAL Weakness Yes  Chills No   ENDOCRINE  No heat or cold intolerance    ALLERGY No hives  Sore throat No stridor  RESP Coughing blood no  Shortness of breath YES   NEURO No Headache  Confusion Pain neck No   CARDIAC No Chest pain No Palpitations   GI  Pain abdomen NO   Vomiting NO     PHYSICAL EXAM    HEENT Unremarkable  atraumatic   RESP Fair air entry EXP prolonged    Harsh breath sound Resp distres mild   CARDIAC S1 S2 No S3     NO JVD    ABDOMEN SOFT BS PRESENT NOT DISTENDED No hepatosplenomegaly PEDAL EDEMA present No calf tenderness  NO rash       DOA/CC/PROBLEMS poa .  67 m  doa 4/28/2022  cc   4/28/2022 rlq pain x 1 d   4/28/2022 fever x 1 d      PMH-PSH .  pmh BPH  pmh Self catheterizes  pmh Renal failure   pmh    HOSPITAL COURSE .  AC APPENDICITIS poa   4/28/2022 zosyn started   4/28 Surgery done Dr Lopez    4/28 Found to have perfo appy    E coli ESBL bacteremia 4/28 4/29/2022 Started meropenem   L HYDRONEPHROSOIS 4/28/2022 CTAP 4/28 Dr Luis Carlso bear advised indwelling vincent while in hospital He also recommended ,more frequent catherizations as ct foindings cw chr retn   CKD 4/28/2022 Cr 2.5 renal calld   VINCENT Placed 4/28 as martín by VENUS      COVID/ICU/CODE STATUS.                       COVID  STATUS.   4/28/2022 scv2 (-)        ICU STAY. none  GOC.  4/28/2022 full code     BEST PRACTICE ISSUES.                                                  HEAD OF BED ELEVATION. Yes  DVT PROPHYLAXIS.   4/29/2022 lvnx 40      MENDOZA PROPHYLAXIS.   4/28 protonix                                                                                      DIET.   4/29/2022 reg     VITALS/PO/IO/VENT/DRIPS.   5/1/2022 afeb 89 160/90      PROBLEM DATA/ASSESSMENT RECOMMENDATIONS (A/R).    HEMODYNAMICS.   Monitor bp Target MAP 65 (+)    RESP.   Monitor po Target po 90-95%    PMH/PSH PROBLEMS.  Management continued/modified as indicated    OXYGEN REQUIREMENTS.  5/1/2022 ra 95%       INFECTION SOURCE.   acute appy  4/28/2022 Pain rlq x 1 d  4/28/2022  ctap nc ac appy prostatomeg  4/28 esbl e coli bacteremia   4/29 blod c (-)   INFECTION A/R.   4/29/2022 sp appy surgery 4/28  n po diet  on meropenem for bacteremia started 4/29     CKD.  Na 4/28/2022 Na 138  Cr 4/28-4/29-4/30-5/1/2022 Cr 2.5 -2.7- 2.7- 2.4   CO2 4/28/2022 CO2 21   Likely has obstructive uropathy from BPH (self-catheterizes)   Renal consulted as requested by Dr Barry     HYDRONEPHROSIS.  4/28/2022 ctap mild l hydroureteronephrposis no obstr ureteral calculus   4/28/2022 Dr Aldridge recommended id floey while in hospital     VINCENT PLACED 4/28  As martín  to keep while in hosp    ACUTE APPENDICITIS  4/28/2022 Pain rlq x 1 d  4/28/2022  ctap nc ac appy prostatomeg  4/28/2022 Pt admitted by Dr Lopez  4/28/2022 zosyn-> 4/29/2022 meropenem  4/28/2022 npo iv abio  4/29 reg diet      PAIN CONSTROL.    IV fl.  4/30/2022 1/2 ns 75    TIME SPENT   Over 25 minutes aggregate care time spent on encounter; activities included   direct patient care, counseling and/or coordinating care reviewing notes, lab data/ imaging , discussion with multidisciplinary team/ patient  /family and explaining in detail risks, benefits, alternatives  of the recommendations     BRINA VASQUEZ 67 m 4/28/2022 1955 DR SARAHI LOPEZ   
  BRINA FLOYD VASQUEZ 67 m 4/28/2022 1955 DR SARAHI LOPEZ     REVIEW OF SYMPTOMS      Able to give ROS  Yes     RELIABLE +/-   CONSTITUTIONAL Weakness Yes  Chills No   ENDOCRINE  No heat or cold intolerance    ALLERGY No hives  Sore throat No stridor  RESP Coughing blood no  Shortness of breath YES   NEURO No Headache  Confusion Pain neck No   CARDIAC No Chest pain No Palpitations   GI  Pain abdomen NO   Vomiting NO     PHYSICAL EXAM    HEENT Unremarkable  atraumatic   RESP Fair air entry EXP prolonged    Harsh breath sound Resp distres mild   CARDIAC S1 S2 No S3     NO JVD    ABDOMEN SOFT BS PRESENT NOT DISTENDED No hepatosplenomegaly PEDAL EDEMA present No calf tenderness  NO rash       DOA/CC/PROBLEMS poa .  67 m  doa 4/28/2022  cc   4/28/2022 rlq pain x 1 d   4/28/2022 fever x 1 d      PMH-PSH .  pmh BPH  pmh Self catheterizes  pmh Renal failure   pmh    HOSPITAL COURSE .  AC APPENDICITIS poa 4/28/2022 zosyn started 4/28/2022 Dr Lopez surg on case  4/28 Surgery done Found to have perfo appy    E coli ESBL bacteremia 4/28 4/29/2022 Started meropenem   L HYDRONEPHROSOIS 4/28/2022 CTAP Dr Aldridge callgraham advised indwelling vincent while in hospital He also recommended ,more frequent catherizations as ct foindings cw chr retn   CKD 4/28/2022 Cr 2.5 renal calld       COVID/ICU/CODE STATUS.                       COVID  STATUS.   4/28/2022 scv2 (-)        ICU STAY. none  GOC.  4/28/2022 full code     BEST PRACTICE ISSUES.                                                  HEAD OF BED ELEVATION. Yes  DVT PROPHYLAXIS.   4/29/2022 lvnx 40      MENDOZA PROPHYLAXIS.   4/28 protonix                                                                                      DIET.   4/29/2022 reg     VITALS/PO/IO/VENT/DRIPS.   4/30/2022 afeb 78 150/60       PROBLEM DATA/ASSESSMENT RECOMMENDATIONS (A/R).    HEMODYNAMICS.   Monitor bp Target MAP 65 (+)    RESP.   Monitor po Target po 90-95%    PMH/PSH PROBLEMS.  Management continued/modified as indicated    OXYGEN REQUIREMENTS.  4/30/2022 ra 93%       INFECTION SOURCE.   acute appy  4/28/2022 Pain rlq x 1 d  4/28/2022  ctap nc ac appy prostatomeg  4/28 esbl e coli bacteremia   INFECTION A/R.   4/29/2022 sp appy surgery 4/28  n po diet  on meropenem for bacteremia       INFECTION LAZO.  W 4/28-4/29-4/30/2022 w 7.7 -20 - 15   ua 4/28/2022 ua w 11-25   CXR 4/28/2022 (-)   ct 4/28/2022 ctap nc ac appy prostatomeg  MICROBIO.  blod c4/28 e coli esbl   urine c 4/28 100 k gn r   ABIO.  4/29/2022 meropenem Dr BERMUDEZ     CKD.  Na 4/28/2022 Na 138  Cr 4/28-4/29-4/30/2022 Cr 2.5 -2.7- 2.7  CO2 4/28/2022 CO2 21   Likely has obstructive uropathy from BPH (self-catheterizes)   Renal consulted as requested by Dr Barry     HYDRONEPHROSIS.  4/28/2022 ctap mild l hydroureteronephrposis no obstr ureteral calculus   4/28/2022 Dr Aldridge recommended id floey while in hospital     VINCENT PLACED 4/28  As martín  to keep while in hosp    ACUTE APPENDICITIS  4/28/2022 Pain rlq x 1 d  4/28/2022  ctap nc ac appy prostatomeg  4/28/2022 Pt admitted by Dr Lopez  4/28/2022 zosyn-> 4/29/2022 meropenem  4/28/2022 npo iv abio  4/29 reg diet      PAIN CONSTROL.    IV fl.  4/30/2022 1/2 ns 75    TIME SPENT   Over 25 minutes aggregate care time spent on encounter; activities included   direct patient care, counseling and/or coordinating care reviewing notes, lab data/ imaging , discussion with multidisciplinary team/ patient  /family and explaining in detail risks, benefits, alternatives  of the recommendations     BRINA VASQUEZ 67 m 4/28/2022 1955 DR SARAHI LOPEZ     
  BRINA FLOYD VASQUEZ 67 m 4/28/2022 1955 DR SARAHI LOPEZ     REVIEW OF SYMPTOMS      Able to give ROS  Yes     RELIABLE +/-   CONSTITUTIONAL Weakness Yes  Chills No   ENDOCRINE  No heat or cold intolerance    ALLERGY No hives  Sore throat No stridor  RESP Coughing blood no  Shortness of breath YES   NEURO No Headache  Confusion Pain neck No   CARDIAC No Chest pain No Palpitations   GI  Pain abdomen NO   Vomiting NO     PHYSICAL EXAM    HEENT Unremarkable  atraumatic   RESP Fair air entry EXP prolonged    Harsh breath sound Resp distres mild   CARDIAC S1 S2 No S3     NO JVD    ABDOMEN SOFT BS PRESENT NOT DISTENDED No hepatosplenomegaly PEDAL EDEMA present No calf tenderness  NO rash       DOA/CC/PROBLEMS poa .  67 m  doa 4/28/2022  cc   4/28/2022 rlq pain x 1 d   4/28/2022 fever x 1 d      PMH-PSH .  pmh BPH  pmh Self catheterizes  pmh Renal failure   pmh    HOSPITAL COURSE .  AC APPENDICITIS poa 4/28/2022 zosyn started 4/28/2022 Dr Lopez surg on case  4/28 Surgery ocasio    L HYDRONEPHROSOIS 4/28/2022 CTAP Dr Aldridge calld   CKD 4/28/2022 Cr 2.5 renal calld       COVID/ICU/CODE STATUS.                       COVID  STATUS.   4/28/2022 scv2 (-)        ICU STAY. none  GOC.  4/28/2022 full code     BEST PRACTICE ISSUES.                                                  HEAD OF BED ELEVATION. Yes  DVT PROPHYLAXIS.   4/29/2022 lvnx 40      MENDOZA PROPHYLAXIS.                                                                                        DIET.   4/29/2022 reg     VITALS/PO/IO/VENT/DRIPS.    4/29/2022 afeb 68 115/60       PROBLEM DATA/ASSESSMENT RECOMMENDATIONS (A/R).    HEMODYNAMICS.   Monitor bp Target MAP 65 (+)    RESP.   Monitor po Target po 90-95%    PMH/PSH PROBLEMS.  Management continued/modified as indicated    OXYGEN REQUIREMENTS.  4/28/2022 ra 96%       INFECTION SOURCE.   acute appy  4/28/2022 Pain rlq x 1 d  4/28/2022  ctap nc ac appy prostatomeg  4/28 esbl e coli bacteremia   INFECTION A/R.   4/29/2022 sp appy surgery 4/28  n po diet  on meropenem for bacteremia     INFECTION LAZO.  W 4/28-4/29/2022 w 7.7 -20   ua 4/28/2022 ua w 11-25   CXR 4/28/2022 (-)   ct 4/28/2022 ctap nc ac appy prostatomeg  MICROBIO.  blod c4/28 e coli esbl   ABIO.  4/29/2022 meropenem Dr BERMUDEZ     CKD.  Na 4/28/2022 Na 138  Cr 4/28-4/29/2022 Cr 2.5 -2.7  CO2 4/28/2022 CO2 21   Likely has obstructive uropathy from BPH (self-catheterizes)   Renal consulted as requested by Dr Barry     HYDRONEPHROSIS.  4/28/2022 ctap mild l hydroureteronephrposis no obstr ureteral calculus   4/28/2022 Dr Aldridge recommended id floey while in hospital     ACUTE APPENDICITIS  4/28/2022 Pain rlq x 1 d  4/28/2022  ctap nc ac appy prostatomeg  4/28/2022 Pt admitted by Dr Lopez  4/28/2022 zosyn-> 4/29/2022 meropenem  4/28/2022 npo iv abio      PAIN CONSTROL.    IV fl.  4/29/2022 ns 50    TIME SPENT   Over 25 minutes aggregate care time spent on encounter; activities included   direct patient care, counseling and/or coordinating care reviewing notes, lab data/ imaging , discussion with multidisciplinary team/ patient  /family and explaining in detail risks, benefits, alternatives  of the recommendations     BRINA VASQUEZ 67 m 4/28/2022 1955 DR SARAHI LOPEZ   
67 year old male with BPH POD#3 s/p laparoscopic appendectomy for perforated appendix.
67y Male POD# 2 s/p lap appendectomy for perforated appendicitis. Noted today to have gram variable rods on blood culture.
POD #1, S/P Laparoscopic Appendectomy for Gangrenous/ Perforated Appendicitis  Patient personally seen and examined during rounds.  In great spirits, but demanding food.  Reports minimal incisional pain and no RLQ/ diffuse abdominal pain.  Vitals non-suggestive.  JAISON with seropurulent fluid.  Wounds clean and abdomen soft with appropriate incisional tenderness.  Labs with likely reactive leukocytosis, but relatively stable HgB and chemistry.  Clinically, improving overall.  Surgically, stable for present.  To continue current supportive care.  Mr. Ghotra is aware of need for prolonged ABX in light of bacteremia.  Awaiting further recommendations from ID.  Reviewed with patient in detail, ID attending, Surgery PA and today's RN. 
POD #5  Mr. Aguillon personally seen and examined during AM rounds.  Vitals non-suggestive.  Josué drain still producing, but very light and thin serous character.  Wounds clean and abdomen soft.  RLQ without visible mass.  Lower abdomen without palpable collection.  Entire abdomen non tender.  Labs reassuring overall, slight increase in WBCs likely reactive with procedure yesterday.  Clinically, improving overall.  Surgically, stable with excellent overall continued progress.  Transition to home IV ABX to complete course.  Plan remains for follow-up in office in 1 week for drain removal.  Mr. Aguillon aware of plan for IR follow-up in 2 weeks for catheter removal.  Information/ contact number provided for Urology follow-up.  Encouraged to call with questions/ concerns/ changes at any time.  Reviewed with patient in detail, Surgery PA and today's RN.  Discharge today once VNS confirmed as in placen.  Mr. Aguillon is pleased and agrees.
POD #4  Mr. Aguillon personally seen and examined during early PM rounds.  Vitals non-suggestive.  Josué drain still producing, but tapering and now light serous color/ character.  Wounds clean, abdomen soft with slight/ appropriate incisional tenderness.  Drain secure.  Labs reassuring.  Clinically, improving overall.  Surgically, stable with excellent overall progress.  To continue current supportive care with transition to home IV ABX to complete course.  Plan for follow-up in office in 1 week for drain removal.  Plan for IR follow-up in 1 to 2 weeks for PICC/ catheter removal.  Plan for routine outpatient Urology follow-up for BPH.  Encouraged to call with questions/ concerns/ changes at any time.  Reviewed with patient in detail, Surgery PA and today's RN.  Will update PMD/ Hospitalist attending.

## 2022-05-03 NOTE — DIETITIAN INITIAL EVALUATION ADULT - PERTINENT MEDS FT
MEDICATIONS  (STANDING):  chlorhexidine 4% Liquid 1 Application(s) Topical <User Schedule>  enoxaparin Injectable 40 milliGRAM(s) SubCutaneous every 24 hours  meropenem  IVPB 1000 milliGRAM(s) IV Intermittent every 12 hours  metoprolol tartrate 12.5 milliGRAM(s) Oral daily  pantoprazole  Injectable 40 milliGRAM(s) IV Push daily  senna 2 Tablet(s) Oral at bedtime  sodium chloride 0.45%. 1000 milliLiter(s) (75 mL/Hr) IV Continuous <Continuous>  tamsulosin 0.8 milliGRAM(s) Oral once    MEDICATIONS  (PRN):  acetaminophen     Tablet .. 650 milliGRAM(s) Oral every 6 hours PRN Temp greater or equal to 38C (100.4F), Mild Pain (1 - 3)  HYDROmorphone  Injectable 0.5 milliGRAM(s) IV Push every 3 hours PRN Severe Pain (7 - 10)  melatonin 5 milliGRAM(s) Oral at bedtime PRN Insomnia  oxyCODONE    IR 5 milliGRAM(s) Oral every 4 hours PRN Moderate Pain (4 - 6)  sodium chloride 0.9% lock flush 10 milliLiter(s) IV Push every 1 hour PRN Pre/post blood products, medications, blood draw, and to maintain line patency

## 2022-05-03 NOTE — DISCHARGE NOTE NURSING/CASE MANAGEMENT/SOCIAL WORK - NSDCPEFALRISK_GEN_ALL_CORE
For information on Fall & Injury Prevention, visit: [email or web address] OR  [email or web address] OR  https://www.cdc.gov/steadi/patient.html

## 2022-05-03 NOTE — PROGRESS NOTE ADULT - SUBJECTIVE AND OBJECTIVE BOX
Patient is a 67y Male whom presented to the hospital with ckd and aaron     PAST MEDICAL & SURGICAL HISTORY:  Gout    Enlarged prostate    No significant past surgical history        MEDICATIONS  (STANDING):  acetaminophen   IVPB .. 1000 milliGRAM(s) IV Intermittent once  piperacillin/tazobactam IVPB.. 3.375 Gram(s) IV Intermittent every 8 hours  sodium chloride 0.9%. 1000 milliLiter(s) (75 mL/Hr) IV Continuous <Continuous>  tamsulosin 0.8 milliGRAM(s) Oral once      Allergies    No Known Allergies    Intolerances        SOCIAL HISTORY:  Denies ETOh,Smoking,     FAMILY HISTORY:      REVIEW OF SYSTEMS:    CONSTITUTIONAL: No weakness, fevers or chills  RESPIRATORY: No cough, wheezing, hemoptysis; No shortness of breath  CARDIOVASCULAR: No chest pain or palpitations  GASTROINTESTINAL: pos  abdominal ,  epigastric pain. No nausea, vomiting,     No diarrhea or constipation. No melena   GENITOURINARY: No dysuria, frequency or hematuria                                         pip      PHYSICAL EXAM:    Constitutional: NAD  HEENT: conjunctive   clear   Neck:  No JVD  Respiratory: CTAB  Cardiovascular: S1 and S2  Gastrointestinal: BS+, soft, positive Tender /ND  Extremities: No peripheral edema                          13.8   12.07 )-----------( 241      ( 03 May 2022 07:58 )             41.3       CBC Full  -  ( 03 May 2022 07:58 )  WBC Count : 12.07 K/uL  RBC Count : 4.82 M/uL  Hemoglobin : 13.8 g/dL  Hematocrit : 41.3 %  Platelet Count - Automated : 241 K/uL  Mean Cell Volume : 85.7 fl  Mean Cell Hemoglobin : 28.6 pg  Mean Cell Hemoglobin Concentration : 33.4 gm/dL  Auto Neutrophil # : x  Auto Lymphocyte # : x  Auto Monocyte # : x  Auto Eosinophil # : x  Auto Basophil # : x  Auto Neutrophil % : x  Auto Lymphocyte % : x  Auto Monocyte % : x  Auto Eosinophil % : x  Auto Basophil % : x      05-03    139  |  105  |  48<H>  ----------------------------<  78  3.9   |  26  |  2.30<H>    Ca    9.6      03 May 2022 07:58  Phos  3.2     05-03  Mg     2.2     05-03        CAPILLARY BLOOD GLUCOSE          Vital Signs Last 24 Hrs  T(C): 36.6 (03 May 2022 12:59), Max: 37.4 (02 May 2022 20:57)  T(F): 97.8 (03 May 2022 12:59), Max: 99.3 (02 May 2022 20:57)  HR: 61 (03 May 2022 05:18) (61 - 76)  BP: 129/77 (03 May 2022 12:59) (129/77 - 130/70)  BP(mean): --  RR: 18 (03 May 2022 12:59) (18 - 18)  SpO2: 95% (03 May 2022 12:59) (93% - 95%)              MEDICATIONS  (STANDING):  acetaminophen   IVPB .. 1000 milliGRAM(s) IV Intermittent once  piperacillin/tazobactam IVPB.. 3.375 Gram(s) IV Intermittent every 8 hours  sodium chloride 0.9%. 1000 milliLiter(s) (75 mL/Hr) IV Continuous <Continuous>  tamsulosin 0.8 milliGRAM(s) Oral once

## 2022-05-03 NOTE — DISCHARGE NOTE NURSING/CASE MANAGEMENT/SOCIAL WORK - PATIENT PORTAL LINK FT
You can access the FollowMyHealth Patient Portal offered by [facility] by registering at the following website: [email or web address]. By joining [employer]’s FollowMyHealth portal, you will also be able to view your health information using other applications (apps) compatible with our system.

## 2022-05-03 NOTE — PROGRESS NOTE ADULT - REASON FOR ADMISSION
acute appendicitis

## 2022-05-03 NOTE — PROGRESS NOTE ADULT - NUTRITIONAL ASSESSMENT
MEDICATIONS  (STANDING):  enoxaparin Injectable 40 milliGRAM(s) SubCutaneous every 24 hours  meropenem  IVPB 500 milliGRAM(s) IV Intermittent every 12 hours  pantoprazole  Injectable 40 milliGRAM(s) IV Push daily  senna 2 Tablet(s) Oral at bedtime  sodium chloride 0.9%. 1000 milliLiter(s) (50 mL/Hr) IV Continuous <Continuous>  tamsulosin 0.8 milliGRAM(s) Oral once

## 2022-05-04 LAB
CULTURE RESULTS: SIGNIFICANT CHANGE UP
CULTURE RESULTS: SIGNIFICANT CHANGE UP
SPECIMEN SOURCE: SIGNIFICANT CHANGE UP
SPECIMEN SOURCE: SIGNIFICANT CHANGE UP

## 2022-05-05 NOTE — PROGRESS NOTE ADULT - PROBLEM SELECTOR PLAN 1
- Blood cultures reviewed.  - Continue IV meropenem per ID  - Continue REG diet  - monitor drain output  - Serial abdominal exams  - Is & Os  - DVT prophylaxis with Lovenox  - OOB, pain control  - Incentive spirometry  - Follow up AM labs Problem: Discharge Planning  Goal: Discharge to home or other facility with appropriate resources  Outcome: Progressing     Problem: Safety - Adult  Goal: Free from fall injury  5/4/2022 2107 by Viktor Rushing LPN  Outcome: Progressing  5/4/2022 1253 by Jose Carrington RN  Outcome: Progressing     Problem: Skin/Tissue Integrity  Goal: Absence of new skin breakdown  Description: 1. Monitor for areas of redness and/or skin breakdown  2. Assess vascular access sites hourly  3. Every 4-6 hours minimum:  Change oxygen saturation probe site  4. Every 4-6 hours:  If on nasal continuous positive airway pressure, respiratory therapy assess nares and determine need for appliance change or resting period.   5/4/2022 2107 by Viktor Rushing LPN  Outcome: Progressing  5/4/2022 1253 by Jose Carrington RN  Outcome: Progressing     Problem: ABCDS Injury Assessment  Goal: Absence of physical injury  Outcome: Progressing  Flowsheets (Taken 5/4/2022 2022)  Absence of Physical Injury: Implement safety measures based on patient assessment

## 2022-05-09 PROBLEM — Z00.00 ENCOUNTER FOR PREVENTIVE HEALTH EXAMINATION: Status: ACTIVE | Noted: 2022-05-09

## 2022-05-10 ENCOUNTER — NON-APPOINTMENT (OUTPATIENT)
Age: 67
End: 2022-05-10

## 2022-05-11 ENCOUNTER — NON-APPOINTMENT (OUTPATIENT)
Age: 67
End: 2022-05-11

## 2022-05-11 ENCOUNTER — APPOINTMENT (OUTPATIENT)
Dept: SURGERY | Facility: CLINIC | Age: 67
End: 2022-05-11
Payer: MEDICAID

## 2022-05-11 VITALS
BODY MASS INDEX: 25.92 KG/M2 | SYSTOLIC BLOOD PRESSURE: 100 MMHG | OXYGEN SATURATION: 99 % | WEIGHT: 175 LBS | DIASTOLIC BLOOD PRESSURE: 60 MMHG | HEART RATE: 69 BPM | HEIGHT: 69 IN | TEMPERATURE: 97.9 F

## 2022-05-11 DIAGNOSIS — K35.80 UNSPECIFIED ACUTE APPENDICITIS: ICD-10-CM

## 2022-05-11 DIAGNOSIS — Z83.3 FAMILY HISTORY OF DIABETES MELLITUS: ICD-10-CM

## 2022-05-11 DIAGNOSIS — Z82.3 FAMILY HISTORY OF STROKE: ICD-10-CM

## 2022-05-11 DIAGNOSIS — Z80.1 FAMILY HISTORY OF MALIGNANT NEOPLASM OF TRACHEA, BRONCHUS AND LUNG: ICD-10-CM

## 2022-05-11 DIAGNOSIS — Z78.9 OTHER SPECIFIED HEALTH STATUS: ICD-10-CM

## 2022-05-11 PROCEDURE — 99024 POSTOP FOLLOW-UP VISIT: CPT

## 2022-05-12 PROBLEM — Z82.3 FAMILY HISTORY OF CEREBROVASCULAR ACCIDENT (CVA): Status: ACTIVE | Noted: 2022-05-11

## 2022-05-12 PROBLEM — Z80.1 FAMILY HISTORY OF LUNG CANCER: Status: ACTIVE | Noted: 2022-05-11

## 2022-05-12 PROBLEM — K35.80 ACUTE APPENDICITIS: Status: RESOLVED | Noted: 2022-05-12 | Resolved: 2022-05-12

## 2022-05-12 PROBLEM — Z78.9 NON-SMOKER: Status: ACTIVE | Noted: 2022-05-11

## 2022-05-12 PROBLEM — Z83.3 FAMILY HISTORY OF DIABETES MELLITUS: Status: ACTIVE | Noted: 2022-05-11

## 2022-05-12 NOTE — PLAN
[FreeTextEntry1] : S/P uneventful general anesthesia with uncomplicated laparoscopic appendectomy for perforated appendicitis with localized fecal peritonitis and associated bacteremia.\par Mr. GONSALVES is clinically well by this history and surgically stable by this examination.\par There is no evidence by history or examination to suggest complication.\par The Pathology report and it's benign nature was reviewed in detail.\par Josué drain removed today without issue.\par In light of bacteremia and persistent elevation of WBCs, will continue IV ABX for now and arrange for interval CT to rule out abscess/ collection.  However, by history, vitals and exam, Mr. Gonsalves is progressing well.\par Mr. GONSALVES  has been encouraged to call with questions or concerns at any time.  \par Otherwise, further recommendations to follow CT.  All the above discussed with him, girlfriend and ID attending.\par Mr. GONSALVES is pleased and agrees, leaving in good spirits able to verbalize instructions as outlined above.

## 2022-05-12 NOTE — HISTORY OF PRESENT ILLNESS
[de-identified] : Very pleasant patient presenting to the office today in the company of his girlfriend/ long term partner.\par Mr. Aguillon is now s/p recent hospitalization and laparoscopic appendectomy for perforated appendicitis.\par He has been discharged to home on ongoing IV ABX in coordination with his ID consultant.\par Mr. Aguillon reports minimal serous drainage from his Josué drain of less than 10 cc of serous content daily.\par Presently, outside of minimal fatigue, he denies ongoing systemic complaints such as fever or chills, any abdominal pain or GI complaints...

## 2022-05-12 NOTE — PHYSICAL EXAM
[Respiratory Effort] : normal respiratory effort [Normal Rate and Rhythm] : normal rate and rhythm [No HSM] : no hepatosplenomegaly [Tender] : was nontender [Enlarged] : not enlarged [No Rash or Lesion] : No rash or lesion [Alert] : alert [Oriented to Person] : oriented to person [Oriented to Place] : oriented to place [Oriented to Time] : oriented to time [Calm] : calm [de-identified] : Appears well, no acute distress, ambulates easily into office and assumes examination table without need of assistance.  [de-identified] : Normocephalic and atraumatic.  [de-identified] : Supple with full range of motion.  [FreeTextEntry1] : No cervical, supraclavicular, axillary or inguinal adenopathy.  [de-identified] : No visible lesions or palpable masses. [de-identified] : RLQ without visible fullness.\par Lower abdomen without palpable fullness.\par Entire abdomen non tender to deep palpation.\par Josué drain secure with light serous drainage in LLQ.\par All operative incisions clean and dry and intact. [de-identified] : Normal external genitalia. [de-identified] : Deferred.  [de-identified] : Grossly symmetric and within normal limits without motor or sensory deficits.

## 2022-05-12 NOTE — REVIEW OF SYSTEMS
[Feeling Poorly] : not feeling poorly [Feeling Tired] : feeling tired [Sore Throat] : sore throat [Hoarseness] : hoarseness [Anxiety] : no anxiety [Depression] : no depression [Negative] : Heme/Lymph [FreeTextEntry4] : Previous/ longstanding... [FreeTextEntry8] : Self catheterization for longstanding retention/ BPH disease...

## 2022-05-14 ENCOUNTER — APPOINTMENT (OUTPATIENT)
Dept: CT IMAGING | Facility: CLINIC | Age: 67
End: 2022-05-14

## 2022-05-16 ENCOUNTER — APPOINTMENT (OUTPATIENT)
Dept: CT IMAGING | Facility: CLINIC | Age: 67
End: 2022-05-16
Payer: MEDICAID

## 2022-05-16 ENCOUNTER — OUTPATIENT (OUTPATIENT)
Dept: OUTPATIENT SERVICES | Facility: HOSPITAL | Age: 67
LOS: 1 days | End: 2022-05-16
Payer: MEDICAID

## 2022-05-16 DIAGNOSIS — Z90.49 ACQUIRED ABSENCE OF OTHER SPECIFIED PARTS OF DIGESTIVE TRACT: ICD-10-CM

## 2022-05-16 PROCEDURE — 74176 CT ABD & PELVIS W/O CONTRAST: CPT | Mod: 26

## 2022-05-16 PROCEDURE — 74176 CT ABD & PELVIS W/O CONTRAST: CPT

## 2022-05-17 ENCOUNTER — NON-APPOINTMENT (OUTPATIENT)
Age: 67
End: 2022-05-17

## 2022-05-18 ENCOUNTER — APPOINTMENT (OUTPATIENT)
Dept: SURGERY | Facility: CLINIC | Age: 67
End: 2022-05-18
Payer: MEDICAID

## 2022-05-18 ENCOUNTER — RESULT REVIEW (OUTPATIENT)
Age: 67
End: 2022-05-18

## 2022-05-18 ENCOUNTER — OUTPATIENT (OUTPATIENT)
Dept: OUTPATIENT SERVICES | Facility: HOSPITAL | Age: 67
LOS: 1 days | End: 2022-05-18
Payer: MEDICAID

## 2022-05-18 VITALS
BODY MASS INDEX: 26.12 KG/M2 | SYSTOLIC BLOOD PRESSURE: 100 MMHG | DIASTOLIC BLOOD PRESSURE: 72 MMHG | OXYGEN SATURATION: 96 % | TEMPERATURE: 97.2 F | HEIGHT: 69 IN | HEART RATE: 60 BPM | WEIGHT: 176.37 LBS

## 2022-05-18 DIAGNOSIS — Z79.2 LONG TERM (CURRENT) USE OF ANTIBIOTICS: ICD-10-CM

## 2022-05-18 DIAGNOSIS — Z90.49 ACQUIRED ABSENCE OF OTHER SPECIFIED PARTS OF DIGESTIVE TRACT: ICD-10-CM

## 2022-05-18 DIAGNOSIS — R10.9 UNSPECIFIED ABDOMINAL PAIN: ICD-10-CM

## 2022-05-18 DIAGNOSIS — K21.9 GASTRO-ESOPHAGEAL REFLUX DISEASE W/OUT ESOPHAGITIS: ICD-10-CM

## 2022-05-18 DIAGNOSIS — N40.0 BENIGN PROSTATIC HYPERPLASIA WITHOUT LOWER URINARY TRACT SYMPMS: ICD-10-CM

## 2022-05-18 PROCEDURE — 99214 OFFICE O/P EST MOD 30 MIN: CPT | Mod: 24

## 2022-05-18 PROCEDURE — 36589 REMOVAL TUNNELED CV CATH: CPT | Mod: RT

## 2022-05-18 NOTE — PROCEDURE NOTE - PROCEDURE FINDINGS AND DETAILS
Under sterile conditions, Powerline removed without incident.  Pressure was applied over right IJ for 15 minutes.

## 2022-05-18 NOTE — PROCEDURE NOTE - NSICDXPROCEDURE_GEN_ALL_CORE_FT
PROCEDURES:  Removal of tunnelled central venous catheter without port 18-May-2022 12:37:32  Alan Han

## 2022-05-23 ENCOUNTER — EMERGENCY (EMERGENCY)
Facility: HOSPITAL | Age: 67
LOS: 1 days | Discharge: HOME | End: 2022-05-23
Attending: EMERGENCY MEDICINE | Admitting: EMERGENCY MEDICINE
Payer: MEDICAID

## 2022-05-23 VITALS
DIASTOLIC BLOOD PRESSURE: 79 MMHG | RESPIRATION RATE: 18 BRPM | SYSTOLIC BLOOD PRESSURE: 147 MMHG | HEART RATE: 103 BPM | HEIGHT: 69 IN | OXYGEN SATURATION: 99 % | WEIGHT: 175.05 LBS | TEMPERATURE: 98 F

## 2022-05-23 VITALS
TEMPERATURE: 98 F | SYSTOLIC BLOOD PRESSURE: 161 MMHG | OXYGEN SATURATION: 98 % | RESPIRATION RATE: 16 BRPM | HEART RATE: 62 BPM | DIASTOLIC BLOOD PRESSURE: 90 MMHG

## 2022-05-23 LAB
APPEARANCE UR: ABNORMAL
BACTERIA # UR AUTO: ABNORMAL
BILIRUB UR-MCNC: NEGATIVE — SIGNIFICANT CHANGE UP
COLOR SPEC: ABNORMAL
DIFF PNL FLD: ABNORMAL
EPI CELLS # UR: SIGNIFICANT CHANGE UP
GLUCOSE UR QL: NEGATIVE — SIGNIFICANT CHANGE UP
KETONES UR-MCNC: NEGATIVE — SIGNIFICANT CHANGE UP
LEUKOCYTE ESTERASE UR-ACNC: NEGATIVE — SIGNIFICANT CHANGE UP
NITRITE UR-MCNC: NEGATIVE — SIGNIFICANT CHANGE UP
PH UR: 7 — SIGNIFICANT CHANGE UP (ref 5–8)
PROT UR-MCNC: 500 MG/DL
RBC CASTS # UR COMP ASSIST: >50 /HPF (ref 0–4)
SP GR SPEC: 1 — LOW (ref 1.01–1.02)
UROBILINOGEN FLD QL: NEGATIVE — SIGNIFICANT CHANGE UP

## 2022-05-23 PROCEDURE — 99284 EMERGENCY DEPT VISIT MOD MDM: CPT

## 2022-05-23 PROCEDURE — 87077 CULTURE AEROBIC IDENTIFY: CPT

## 2022-05-23 PROCEDURE — 87186 SC STD MICRODIL/AGAR DIL: CPT

## 2022-05-23 PROCEDURE — 99283 EMERGENCY DEPT VISIT LOW MDM: CPT

## 2022-05-23 PROCEDURE — 87086 URINE CULTURE/COLONY COUNT: CPT

## 2022-05-23 PROCEDURE — 81001 URINALYSIS AUTO W/SCOPE: CPT

## 2022-05-23 NOTE — ED PROVIDER NOTE - ATTENDING APP SHARED VISIT CONTRIBUTION OF CARE
Pt is a 68 yo male who has hx of bph needs to self cath  has hx of enlarged kidneys, and is most recently sp appy. while in hosp he was seen by urology dr Palumbo.  on dc pt has had no post op complications and 3 days ago went to cath himself.  he realized he did not have appropriate lubricant for the cath so he used water. since then he has had blood in urine every time he caths himself.  no fever no chills no other co  he is still able to cath himself  on exam  wd wn male nad   heent nc at with weak/lazy r eye no distress  neck supple resp no distress  abd soft nt nd nohsm no rebound  no guard no cvat  gu: un circ male with scant blood at meatus no pus or drainage   plan-consult with urology ua ro infection dc per conversation with dr Palumbo

## 2022-05-23 NOTE — ED PROVIDER NOTE - NSFOLLOWUPINSTRUCTIONS_ED_ALL_ED_FT
Follow up with urology  return to er for any worsening symptoms            HEMATURIA - General Information           Hematuria    WHAT YOU NEED TO KNOW:    What is hematuria? Hematuria is blood in your urine. Your urine may be bright red to dark brown.    What other signs and symptoms might I have with hematuria?   •Fever      •Nausea and vomiting      •Pain or bruising on your lower back or sides      •Pain or burning when you urinate      •More urination than usual, or the need to urinate right away      •Blood clots in the toilet after you urinate      What causes hematuria? Ask your healthcare provider for more information about these and other causes of hematuria:  •Urinary tract infection      •Kidney or bladder stones      •Swollen prostate      •Kidney disease      •Abdomen or pelvic injury      •Kidney, bladder, or prostate cancer      •Intense exercise      How is hematuria diagnosed? Your healthcare provider will ask when you first saw a change in the color of your urine. Tell him or her about any medical conditions or medicines you take. Some medicines can damage your kidneys or increase your risk for bleeding. You may need any of the following:  •Blood and urine tests may show infection and how well your kidneys are working.      •An ultrasound or CT may show the cause of your hematuria. You may be given contrast liquid to help your urinary tract show up better in the pictures. Tell the healthcare provider if you have ever had an allergic reaction to contrast liquid.      •A cystoscopy may show problems inside your bladder. The cystoscope is a long tube with a lens and a light on the end.      How is hematuria treated? Hematuria may go away without treatment. You may need medicines to treat an infection. Treatment depends on the cause of your hematuria. Ask your healthcare provider for more information about the treatment you may need.    How can I manage my symptoms? Drink liquids as directed. You may need to drink extra liquids to help flush the blood from your body through your urine. Water is the best liquid to drink. Ask how much liquid to drink each day and which liquids are best for you.    When should I seek immediate care?   •You have blood in your urine after a new injury, such as a fall.      •You have severe back or side pain that does not go away with treatment.      When should I call my doctor?   •You are urinating very small amounts or not at all.      •You feel like you cannot empty your bladder.      •You have a fever that gets worse or does not go away with treatment.      •You cannot keep liquids or medicines down.      •Your urine gets darker, even after you drink extra liquids.      •You have questions or concerns about your condition, treatment, or care.      CARE AGREEMENT:    You have the right to help plan your care. Learn about your health condition and how it may be treated. Discuss treatment options with your healthcare providers to decide what care you want to receive. You always have the right to refuse treatment.        © Copyright [employer] 2022           back to top                          © Copyright [employer] 2022

## 2022-05-23 NOTE — ED ADULT TRIAGE NOTE - CHIEF COMPLAINT QUOTE
67yr old male, arrived to ED c/o " 3x days of bright red blood after self catheterizing," per patient didn't have lubrication and thinks that contributed to bleeding, denies lightheadedness or dizziness a this time.

## 2022-05-23 NOTE — ED ADULT NURSE REASSESSMENT NOTE - BP NONINVASIVE DIASTOLIC (MM HG)
90 Methotrexate Counseling:  Patient counseled regarding adverse effects of methotrexate including but not limited to nausea, vomiting, abnormalities in liver function tests. Patients may develop mouth sores, rash, diarrhea, and abnormalities in blood counts. The patient understands that monitoring is required including LFT's and blood counts.  There is a rare possibility of scarring of the liver and lung problems that can occur when taking methotrexate. Persistent nausea, loss of appetite, pale stools, dark urine, cough, and shortness of breath should be reported immediately. Patient advised to discontinue methotrexate treatment at least three months before attempting to become pregnant.  I discussed the need for folate supplements while taking methotrexate.  These supplements can decrease side effects during methotrexate treatment. The patient verbalized understanding of the proper use and possible adverse effects of methotrexate.  All of the patient's questions and concerns were addressed.

## 2022-05-23 NOTE — ED PROVIDER NOTE - CARE PROVIDER_API CALL
Rashaun Palumbo)  Urology  [address] [place] [address], [address]  [place] [address]  Phone: [phone]  Fax: [phone]  Follow Up Time:

## 2022-05-23 NOTE — ED ADULT NURSE REASSESSMENT NOTE - NSFALLRSKINDICATORS_ED_ALL_ED
[facility]     Office Note      Date: 2022  Patient Name: Dianna Acuña  MRN: [id number]  : 1968    Chief Complaint   Patient presents with   • Shortness of Breath   • Bronchitis       History of Present Illness: Dianna Acuña is a 53 y.o. female who presents for Shortness of Breath and Bronchitis.  Having persistent shortness of breath.  Chest x-ray from last visit negative.  Seen in urgent care and treated for bronchitis with steroids and antibiotic.  Has been taking laxative for constipation.  Reports barking cough.  CT angiogram from May 2021 emergency room visit showed hilar adenopathy.  Recommended follow-up CT scan.  Has GERD and takes omeprazole 40 mg once daily.  Has seen GI in the past.    Subjective      Review of Systems:   Pertinent review of systems per HPI.    Review of Systems   Constitutional: Negative for activity change, appetite change, chills, diaphoresis, fatigue, fever and unexpected weight change.   HENT: Negative for congestion, dental problem, drooling, ear discharge, ear pain, facial swelling, hearing loss and mouth sores.    Eyes: Negative for pain, discharge and itching.   Respiratory: Positive for cough and shortness of breath. Negative for apnea, choking and chest tightness.    Cardiovascular: Negative for chest pain, palpitations and leg swelling.   Gastrointestinal: Negative for abdominal distention, abdominal pain, blood in stool, constipation and diarrhea.   Endocrine: Negative for cold intolerance, heat intolerance, polydipsia and polyuria.   Genitourinary: Negative for difficulty urinating, dysuria, frequency and hematuria.   Skin: Negative for color change, pallor, rash and wound.   Allergic/Immunologic: Negative for environmental allergies, food allergies and immunocompromised state.   Neurological: Negative for dizziness, weakness and light-headedness.   Psychiatric/Behavioral: Negative for agitation, behavioral problems, confusion, decreased concentration and  Subjective:      History was provided by the mother. Brigida Wells is a 2 y.o. male who is brought in by his mother for this well child visit. Birth History    Birth     Weight: 5 lb 6.8 oz (2.46 kg)    Apgar     One: 8.0     Five: 9.0    Discharge Weight: 5 lb 1 oz (2.295 kg)    Delivery Method: Vaginal, Spontaneous    Gestation Age: 40 1/7 wks    Days in Hospital: 2.0     Passed  hearing screening and Passed Critical Congenital Heart Disease Screening  NB metabolic screen - all low risk    Maternal gestational DM, gestational HTN, pre-eclampsia, gestational, gestational thrombocytopenia     Immunization History   Administered Date(s) Administered    DTaP (Infanrix) 2018    DTaP/Hib/IPV (Pentacel) 2017, 2017, 2018    HIB PRP-T (ActHIB, Hiberix) 2018    Hepatitis A Ped/Adol (Havrix, Vaqta) 2018    Hepatitis A Ped/Adol (Vaqta) 2019    Hepatitis B 2017    Hepatitis B Ped/Adol (Engerix-B, Recombivax HB) 2017, 2018    Influenza, Quadv, 6-35 months, IM, PF (Fluzone, Afluria) 2018, 2018, 2018    MMR 2018    Pneumococcal Conjugate 13-valent (Hayley Shannan) 2017, 2017, 2018, 2019    Rotavirus Pentavalent (RotaTeq) 2017, 2017, 2018    Varicella (Varivax) 2018     Patient's medications, allergies, past medical, surgical, social and family histories were reviewed and updated as appropriate. CC: well    * Concerns - he is not following commands well and mom is unsure he is hearing well (she thinks he hears loud sounds well); prev referred to [facility] but has not been seen much r/t COVID - has another session Friday and mom does not think his language skills are progressing very well. Discussed. Pt also had a MCHAT score of 6 previously - had been referred to developmental peds, Dr Huber Moncada, but has not been seen. He is hitting and punching at mom's head frequently.   He "self-injury. The patient is not nervous/anxious.    All other systems reviewed and are negative.    No Known Allergies    Objective     Physical Exam:  Vital Signs:   Vitals:    04/11/22 1148   BP: 114/78   Pulse: 76   Resp: 16   Temp: 98.5 °F (36.9 °C)   SpO2: 97%   Weight: 97.1 kg (214 lb)   Height: 165.1 cm (65\")      Body mass index is 35.61 kg/m².    Physical Exam  Vitals and nursing note reviewed.   Constitutional:       General: She is not in acute distress.     Appearance: She is well-developed.   HENT:      Head: Normocephalic and atraumatic.      Right Ear: External ear normal.      Left Ear: External ear normal.   Eyes:      General: No scleral icterus.        Right eye: No discharge.         Left eye: No discharge.      Conjunctiva/sclera: Conjunctivae normal.   Cardiovascular:      Rate and Rhythm: Normal rate and regular rhythm.      Heart sounds: Normal heart sounds. No murmur heard.    No friction rub. No gallop.   Pulmonary:      Effort: Pulmonary effort is normal. No respiratory distress.      Breath sounds: Normal breath sounds. No wheezing or rales.   Skin:     General: Skin is warm and dry.      Coloration: Skin is not pale.         Assessment / Plan      Assessment & Plan:    1. Hilar adenopathy  We will get follow-up CT to reevaluate for hilar adenopathy  - CT Chest Without Contrast; Future    2. SOB (shortness of breath)  3. GERD without esophagitis  Referral to pulmonology for persistent shortness of breath.  Also discussed likely cause of shortness of breath could be acid reflux as it can present with cough as well.  Lungs completely clear on exam with equal breath.  Increase Prilosec to 40 mg twice daily.  - Ambulatory Referral to Pulmonology          Arianne Silverman MD  04/11/2022   " no tends to wine and cry and throw himself around instead of communicating w mom. He does make some eye contact w mom. Discussed all w mom. He is potty training but often times is disinterested - discussed methods to improve his willingness. He does help to feed himself but he does not seem to be dressing himself. Will obtain a SHEMAR test at this time and have him see audiology and also see dev peds and also have him see the autism ctr for evaluation and also have the microarray testing done - discussed all w mom who stated an understanding. We were unable to provide the ASQ today due to the language barrier and pts behavioral issues. It was a very challenging appt due to the same issues, radha w pt being combative and hitting mom's head and also walking directly in to the counter top (followed by crying through which the  could not hear and interpret). * This was a prolonged visit lasting over 20 minutes in face-to-face time. Visit complete w the  over the phone. We were disconnected and called back again. Mom is interested in getting a MVI for him as he does not drink milk but does have some other dairy. He prefers fruits over veggies but eats both. He also consumes proteins on what sounds like is prob a pretty regular basis. Current Issues:  Current concerns on the part of Frandy's mother include not listening well to commands . Toilet trained? no - not yet - working on it   Concerns regarding hearing?  yes - doesn't seem to listen well   Does patient snore? no     Review of Nutrition:  Current diet: Patient eats very  little vegetables and milk  Doesn't drink   Juice Yes , Water-Yes   Balanced diet? no - very few vegetables and milk     Concerns about going to the bathroom- NO   Brushes teeth- Yes mom brushes      Social Screening:  Current child-care arrangements: in home: primary caregiver is mother  Sibling relations: sisters: 1  Parental coping and self-care: doing well; no concerns  Opportunities for peer interaction? yes   Concerns regarding behavior with peers? no  Secondhand smoke exposure? no       Visit Information    Have you changed or started any medications since your last visit including any over-the-counter medicines, vitamins, or herbal medicines? no   Have you stopped taking any of your medications? Is so, why? -  no  Are you having any side effects from any of your medications? - no    Have you seen any other physician or provider since your last visit?  no   Have you had any other diagnostic tests since your last visit?  no   Have you been seen in the emergency room and/or had an admission in a hospital since we last saw you?  no   Have you had your routine dental cleaning in the past 6 months?  no     Do you have an active MyChart account? If no, what is the barrier? Yes    Patient Care Team:  ORQUIDEA East CNP as PCP - General (Pediatrics)  ORQUIDEA East C[facility]    Medical History Review  Past Medical, Family, and Social History reviewed and does not contribute to the patient presenting condition    Health Maintenance   Topic Date Due    Flu vaccine (1) 09/01/2020    Polio vaccine (4 of 4 - 4-dose series) 08/13/2021    Carine Lapidus (MMR) vaccine (2 of 2 - Standard series) 08/13/2021    Varicella vaccine (2 of 2 - 2-dose childhood series) 08/13/2021    DTaP/Tdap/Td vaccine (5 - DTaP) 08/13/2021    HPV vaccine (1 - Male 2-dose series) 08/13/2028    Meningococcal (ACWY) vaccine (1 - 2-dose series) 08/13/2028    Hepatitis A vaccine  Completed    Hepatitis B vaccine  Completed    Hib vaccine  Completed    Rotavirus vaccine  Completed    Pneumococcal 0-64 years Vaccine  Completed    Lead screen 3-5  Completed                  Objective:        Growth parameters are noted and are appropriate for age. Appears to respond to sounds?  yes  Vision screening done? no    General:   alert and appears stated age uncooperative with exam, easily aggitated   Gait:   normal   Skin:   normal   Oral cavity:   lips, mucosa, and tongue normal; teeth and gums normal   Eyes:   sclerae white, pupils equal and reactive, red reflex normal bilaterally   Ears:   normal bilaterally   Neck:   no adenopathy, supple, symmetrical, trachea midline and thyroid not enlarged, symmetric, no tenderness/mass/nodules   Lungs:  clear to auscultation bilaterally   Heart:   regular rate and rhythm, S1, S2 normal, no murmur, click, rub or gallop   Abdomen:  soft, non-tender; bowel sounds normal; no masses,  no organomegaly   :  normal male - testes descended bilaterally and uncircumcised   Extremities:   extremities normal, atraumatic, no cyanosis or edema   Neuro:  normal without focal findings, OSCAR and language barrier, speech delayed         Assessment:      Healthy exam. 2year old   Diagnosis Orders   1. Encounter for routine child health examination without abnormal findings     2. 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 Uncircumcised male     3. Expressive speech delay  RI AUDITORY EVOKED POTENTIAL    Amb External Referral To Audiology    [address] [place] [address] SIGCHIP   5. Developmental delay  RI AUDITORY EVOKED POTENTIAL    Amb External Referral To Audiology    [address] [place] [address] SIG[place]IP   6. Abnormal developmental screening  [facility]   7. Medium risk of autism based on Modified Checklist for Autism in Toddlers, Revised (M-CHAT-R)  RI AUDITORY EVOKED POTENTIAL    Amb External Referral To Audiology    [address] [place] [address] SIGCHIP   8. Hearing difficulty, unspecified laterality  RI AUDITORY EVOKED POTENTIAL    Amb External Referral To Audiology    [address] [place] [address] SIGCHIP   9. Family history of chromosomal abnormality  CHROM ANAL [address] SIGCHIP   10. Picky eater  pediatric multivitamin-iron (POLY-VI-SOL WITH IRON) 15 MG chewable tablet            Plan:      1. Anticipatory guidance: Gave CRS handout on well-child issues at this age. 2. Screening tests:   a. Venous lead level: no (CDC/AAP recommends if at risk and never done previously)    b. Hb or HCT: no (CDC recommends annually through age 11 years for children at risk;; AAP recommends once age 6-12 months then once at 13 months-5 years)    c. PPD: not applicable (Recommended annually if at risk: immunosuppression, clinical suspicion, poor/overcrowded living conditions, recent immigrant from [facility], contact with adults who are HIV+, homeless, IV drug users, NH residents, farm workers, or with active TB)    d. Cholesterol screening: not applicable (AAP, AHA, and NCEP but not USPSTF recommends fasting lipid profile for h/o premature cardiovascular disease in a parent or grandparent less than 54years old; AAP but not USPSTF recommends total cholesterol if either parent has a cholesterol greater than 240)    3. Immunizations today: none  History of previous adverse reactions to immunizations? no    4. Follow-up visit in 1 year for next well child visit, or sooner as needed. Patient Instructions     Well exam.  Brush teeth twice daily and see the dentist every 6 months. Please follow up with all specialists, as discussed. Referrals provided. Call if any questions or concerns. Return in  1 year for the next well exam.      Child's Well Visit, 3 Years: Care Instructions  Your Care Instructions  Three-year-olds can have a range of feelings, such as being excited one minute to having a temper tantrum the next. Your child may try to push, hit, or bite other children. It may be hard for your child to understand how he or she feels and to listen to you. At this age, your child may be ready to jump, hop, or ride a tricycle. Your child likely knows his or her name, age, and whether he or she is a boy or girl. He or she can copy easy shapes, like circles and crosses. Your child probably likes to dress and feed himself or herself. Follow-up care is a key part of your child's treatment and safety. Be sure to make and go to all appointments, and call your doctor if your child is having problems. It's also a good idea to know your child's test results and keep a list of the medicines your child takes. How can you care for your child at home? Eating  · Make meals a family time. Have nice conversations at mealtime and turn the TV off. · Do not give your child foods that may cause choking, such as nuts, whole grapes, hard or sticky candy, or popcorn. · Give your child healthy foods. Even if your child does not seem to like them at first, keep trying. Buy snack foods made from wheat, corn, rice, oats, or other grains, such as breads, cereals, tortillas, noodles, crackers, and muffins. · Give your child fruits and vegetables every day. Try to give him or her five servings or more. · Give your child at least two servings a day of nonfat or low-fat dairy foods and protein foods. Dairy foods include milk, yogurt, and cheese. Protein foods include lean meat, poultry, fish, eggs, dried beans, peas, lentils, and soybeans. · Do not eat much fast food. Choose healthy snacks that are low in sugar, fat, and salt instead of candy, chips, and other junk foods. · Offer water when your child is thirsty. Do not give your child juice drinks more than one time a day. · Do not use food as a reward or punishment for your child's behavior. Healthy habits  · Help your child brush his or her teeth every day using a \"pea-size\" amount of toothpaste with fluoride. · Limit your child's TV or video time to 1 to 2 hours per day. Check for TV programs that are good for 1year olds. · Do not smoke or allow others to smoke around your child.  Smoking around your child increases the child's risk for ear infections, asthma, colds, and pneumonia. If you need help quitting, talk to your doctor about stop-smoking programs and medicines. These can increase your chances of quitting for good. Safety  · For every ride in a car, secure your child into a properly installed car seat that meets all current safety standards. For questions about car seats and booster seats, call the [employer] at 6-423.675.4604. · Keep cleaning products and medicines in locked cabinets out of your child's reach. Keep the number for Poison Control (1-725.307.1354) near your phone. · Put locks or guards on all windows above the first floor. Watch your child at all times near play equipment and stairs. · Watch your child at all times when he or she is near water, including pools, hot tubs, and bathtubs. Parenting  · Read stories to your child every day. One way children learn to read is by hearing the same story over and over. · Play games, talk, and sing to your child every day. Give them love and attention. · Give your child simple chores to do. Children usually like to help. Potty training  · Let your child decide when to potty train. Your child will decide to use the potty when there is no reason to resist. Tell your child that the body makes \"pee\" and \"poop\" every day, and that those things want to go in the toilet. Ask your child to \"help the poop get into the toilet. \" Then help your child use the potty as much as he or she needs help. · Give praise and rewards. Give praise, smiles, hugs, and kisses for any success. Rewards can include toys, stickers, or a trip to the park. Sometimes it helps to have one big reward, such as a doll or a fire truck, that must be earned by using the toilet every day. Keep this toy in a place that can be easily seen. Try sticking stars on a calendar to keep track of your child's success. When should you call for help?   Watch closely for changes in your child's health, and be sure to contact your doctor if:  · You are concerned that your child is not growing or developing normally. · You are worried about your child's behavior. · You need more information about how to care for your child, or you have questions or concerns. Where can you learn more? Go to [email or web address] org and sign in to your Info account. Enter U435 in the [facility] box to learn more about Child's Well Visit, 3 Years: Care Instructions.     If you do not have an account, please click on the Sign Up Now link. © 0697-8472 Healthwise, Incorporated. Care instructions adapted under license by [facility] ([facility]). This care instruction is for use with your licensed healthcare professional. If you have questions about a medical condition or this instruction, always ask your healthcare professional. [address] any warranty or liability for your use of this information.   Content Version: 92.5.669122; Current as of: September 9, 2014

## 2022-05-23 NOTE — ED PROVIDER NOTE - QUALITY
"Tony discharged 1706 care of sister to return home-Denies SI-Denies current mental health sxs stating repeatedly, \"I just fainted.\"-reviewed use of PRN Haldol and outpt tx plan-verbalized understanding-has PRN supply of Haldol in her possession-  " blood in urine

## 2022-05-23 NOTE — ED ADULT NURSE REASSESSMENT NOTE - NS ED NURSE REASSESS COMMENT FT1
Pt resting comfortably in bed at this time, awaiting dc home.  Gavin to bsd- red bloody urine noted.  Pt denies any pain or discomfort.  No n/v/d.  Will continue to monitor.

## 2022-05-23 NOTE — ED PROVIDER NOTE - PATIENT PORTAL LINK FT
You can access the FollowMyHealth Patient Portal offered by [facility] by registering at the following website: [email or web address]. By joining [employer]’s FollowMyHealth portal, you will also be able to view your health information using other applications (apps) compatible with our system.

## 2022-05-23 NOTE — ED CLERICAL - NS ED CLERK NOTE PRE-ARRIVAL INFORMATION; ADDITIONAL PRE-ARRIVAL INFORMATION
This patient is enrolled in the [facility] readmission reduction program and has active care navigation. This patient can be followed up by the care navigation team within 24 hours. To arrange close follow-up or to obtain additional clinical information about this patient, please call the contact number above.

## 2022-05-23 NOTE — ED PROVIDER NOTE - OBJECTIVE STATEMENT
Pt is a 66 yo male with pmhx of gout and BPH needs to self cath has hx of enlarged kidneys, and is most recently sp appy. while in hosp he was seen by urology dr Palumbo. A few days ago he realized he did not have appropriate lubricant for the cath so he used water. since then he has had blood in urine every time he caths himself. no fever no chills no other co he is still able to cath himself no blood thinner use

## 2022-05-23 NOTE — ED PROVIDER NOTE - NS ED ATTENDING STATEMENT MOD
This was a shared visit with the JORDY. I reviewed and verified the documentation and independently performed the documented:

## 2022-05-23 NOTE — ED PROVIDER NOTE - CLINICAL SUMMARY MEDICAL DECISION MAKING FREE TEXT BOX
Pt is a 66 yo male who has hx of bph needs to self cath  has hx of enlarged kidneys, and is most recently sp appy. while in hosp he was seen by urology dr Palumbo.  on dc pt has had no post op complications and 3 days ago went to cath himself.  he realized he did not have appropriate lubricant for the cath so he used water. since then he has had blood in urine every time he caths himself.  no fever no chills no other co  he is still able to cath himself  on exam  wd wn male nad   heent nc at with weak/lazy r eye no distress  neck supple resp no distress  abd soft nt nd nohsm no rebound  no guard no cvat  gu: un circ male with scant blood at meatus no pus or drainage   plan-consult with urology ua ro infection dc per conversation with dr Palumbo

## 2022-05-23 NOTE — ED ADULT NURSE NOTE - OBJECTIVE STATEMENT
Patient states he self-caths and on Saturday he did not have any lubricant with him and he just used water and he thinks he caused a laceration inside the urethra and whenever he urinates there is blood in the urine.

## 2022-05-24 ENCOUNTER — INPATIENT (INPATIENT)
Facility: HOSPITAL | Age: 67
LOS: 2 days | Discharge: HOME | DRG: 699 | End: 2022-05-27
Attending: FAMILY MEDICINE | Admitting: FAMILY MEDICINE
Payer: MEDICAID

## 2022-05-24 VITALS
TEMPERATURE: 97 F | SYSTOLIC BLOOD PRESSURE: 164 MMHG | HEIGHT: 69 IN | HEART RATE: 112 BPM | OXYGEN SATURATION: 97 % | RESPIRATION RATE: 20 BRPM | WEIGHT: 175.05 LBS | DIASTOLIC BLOOD PRESSURE: 91 MMHG

## 2022-05-24 DIAGNOSIS — T83.9XXA UNSPECIFIED COMPLICATION OF GENITOURINARY PROSTHETIC DEVICE, IMPLANT AND GRAFT, INITIAL ENCOUNTER: ICD-10-CM

## 2022-05-24 DIAGNOSIS — R31.9 HEMATURIA, UNSPECIFIED: ICD-10-CM

## 2022-05-24 PROBLEM — Z79.2 RECEIVING INTRAVENOUS ANTIBIOTIC TREATMENT AT HOME: Status: RESOLVED | Noted: 2022-05-16 | Resolved: 2022-05-24

## 2022-05-24 PROBLEM — N40.0 BPH (BENIGN PROSTATIC HYPERPLASIA): Status: ACTIVE | Noted: 2022-05-24

## 2022-05-24 PROBLEM — Z90.49 S/P LAPAROSCOPIC APPENDECTOMY: Status: ACTIVE | Noted: 2022-05-12

## 2022-05-24 PROBLEM — K21.9 GERD (GASTROESOPHAGEAL REFLUX DISEASE): Status: ACTIVE | Noted: 2022-05-24

## 2022-05-24 LAB
ALBUMIN SERPL ELPH-MCNC: 3 G/DL — LOW (ref 3.3–5)
ALP SERPL-CCNC: 84 U/L — SIGNIFICANT CHANGE UP (ref 40–120)
ALT FLD-CCNC: 19 U/L — SIGNIFICANT CHANGE UP (ref 12–78)
ANION GAP SERPL CALC-SCNC: 6 MMOL/L — SIGNIFICANT CHANGE UP (ref 5–17)
APTT BLD: 30.3 SEC — SIGNIFICANT CHANGE UP (ref 27.5–35.5)
AST SERPL-CCNC: 14 U/L — LOW (ref 15–37)
BASOPHILS # BLD AUTO: 0.04 K/UL — SIGNIFICANT CHANGE UP (ref 0–0.2)
BASOPHILS NFR BLD AUTO: 0.4 % — SIGNIFICANT CHANGE UP (ref 0–2)
BILIRUB SERPL-MCNC: 1 MG/DL — SIGNIFICANT CHANGE UP (ref 0.2–1.2)
BLD GP AB SCN SERPL QL: SIGNIFICANT CHANGE UP
BUN SERPL-MCNC: 34 MG/DL — HIGH (ref 7–23)
CALCIUM SERPL-MCNC: 9.2 MG/DL — SIGNIFICANT CHANGE UP (ref 8.5–10.1)
CHLORIDE SERPL-SCNC: 109 MMOL/L — HIGH (ref 96–108)
CO2 SERPL-SCNC: 23 MMOL/L — SIGNIFICANT CHANGE UP (ref 22–31)
CREAT SERPL-MCNC: 2.4 MG/DL — HIGH (ref 0.5–1.3)
EGFR: 29 ML/MIN/1.73M2 — LOW
EOSINOPHIL # BLD AUTO: 0.28 K/UL — SIGNIFICANT CHANGE UP (ref 0–0.5)
EOSINOPHIL NFR BLD AUTO: 3.1 % — SIGNIFICANT CHANGE UP (ref 0–6)
GLUCOSE SERPL-MCNC: 117 MG/DL — HIGH (ref 70–99)
HCT VFR BLD CALC: 33.9 % — LOW (ref 39–50)
HCT VFR BLD CALC: 37.1 % — LOW (ref 39–50)
HGB BLD-MCNC: 10.7 G/DL — LOW (ref 13–17)
HGB BLD-MCNC: 11.7 G/DL — LOW (ref 13–17)
IMM GRANULOCYTES NFR BLD AUTO: 0.4 % — SIGNIFICANT CHANGE UP (ref 0–1.5)
INR BLD: 1.1 RATIO — SIGNIFICANT CHANGE UP (ref 0.88–1.16)
LYMPHOCYTES # BLD AUTO: 2.25 K/UL — SIGNIFICANT CHANGE UP (ref 1–3.3)
LYMPHOCYTES # BLD AUTO: 25.3 % — SIGNIFICANT CHANGE UP (ref 13–44)
MCHC RBC-ENTMCNC: 28.2 PG — SIGNIFICANT CHANGE UP (ref 27–34)
MCHC RBC-ENTMCNC: 28.2 PG — SIGNIFICANT CHANGE UP (ref 27–34)
MCHC RBC-ENTMCNC: 31.5 GM/DL — LOW (ref 32–36)
MCHC RBC-ENTMCNC: 31.6 GM/DL — LOW (ref 32–36)
MCV RBC AUTO: 89.4 FL — SIGNIFICANT CHANGE UP (ref 80–100)
MCV RBC AUTO: 89.4 FL — SIGNIFICANT CHANGE UP (ref 80–100)
MONOCYTES # BLD AUTO: 0.65 K/UL — SIGNIFICANT CHANGE UP (ref 0–0.9)
MONOCYTES NFR BLD AUTO: 7.3 % — SIGNIFICANT CHANGE UP (ref 2–14)
NEUTROPHILS # BLD AUTO: 5.64 K/UL — SIGNIFICANT CHANGE UP (ref 1.8–7.4)
NEUTROPHILS NFR BLD AUTO: 63.5 % — SIGNIFICANT CHANGE UP (ref 43–77)
NRBC # BLD: 0 /100 WBCS — SIGNIFICANT CHANGE UP (ref 0–0)
NRBC # BLD: 0 /100 WBCS — SIGNIFICANT CHANGE UP (ref 0–0)
PLATELET # BLD AUTO: 316 K/UL — SIGNIFICANT CHANGE UP (ref 150–400)
PLATELET # BLD AUTO: 348 K/UL — SIGNIFICANT CHANGE UP (ref 150–400)
POTASSIUM SERPL-MCNC: 3.9 MMOL/L — SIGNIFICANT CHANGE UP (ref 3.5–5.3)
POTASSIUM SERPL-SCNC: 3.9 MMOL/L — SIGNIFICANT CHANGE UP (ref 3.5–5.3)
PROT SERPL-MCNC: 6.7 G/DL — SIGNIFICANT CHANGE UP (ref 6–8.3)
PROTHROM AB SERPL-ACNC: 12.9 SEC — SIGNIFICANT CHANGE UP (ref 10.5–13.4)
RBC # BLD: 3.79 M/UL — LOW (ref 4.2–5.8)
RBC # BLD: 4.15 M/UL — LOW (ref 4.2–5.8)
RBC # FLD: 14.5 % — SIGNIFICANT CHANGE UP (ref 10.3–14.5)
RBC # FLD: 14.6 % — HIGH (ref 10.3–14.5)
SARS-COV-2 RNA SPEC QL NAA+PROBE: SIGNIFICANT CHANGE UP
SODIUM SERPL-SCNC: 138 MMOL/L — SIGNIFICANT CHANGE UP (ref 135–145)
WBC # BLD: 13.51 K/UL — HIGH (ref 3.8–10.5)
WBC # BLD: 8.9 K/UL — SIGNIFICANT CHANGE UP (ref 3.8–10.5)
WBC # FLD AUTO: 13.51 K/UL — HIGH (ref 3.8–10.5)
WBC # FLD AUTO: 8.9 K/UL — SIGNIFICANT CHANGE UP (ref 3.8–10.5)

## 2022-05-24 PROCEDURE — 93010 ELECTROCARDIOGRAM REPORT: CPT

## 2022-05-24 PROCEDURE — 99284 EMERGENCY DEPT VISIT MOD MDM: CPT

## 2022-05-24 PROCEDURE — 74176 CT ABD & PELVIS W/O CONTRAST: CPT | Mod: 26

## 2022-05-24 RX ORDER — PANTOPRAZOLE SODIUM 20 MG/1
40 TABLET, DELAYED RELEASE ORAL DAILY
Refills: 0 | Status: DISCONTINUED | OUTPATIENT
Start: 2022-05-24 | End: 2022-05-27

## 2022-05-24 RX ORDER — FINASTERIDE 5 MG/1
5 TABLET, FILM COATED ORAL DAILY
Refills: 0 | Status: DISCONTINUED | OUTPATIENT
Start: 2022-05-24 | End: 2022-05-27

## 2022-05-24 RX ORDER — CEFTRIAXONE 500 MG/1
1000 INJECTION, POWDER, FOR SOLUTION INTRAMUSCULAR; INTRAVENOUS ONCE
Refills: 0 | Status: DISCONTINUED | OUTPATIENT
Start: 2022-05-24 | End: 2022-05-24

## 2022-05-24 RX ORDER — ACETAMINOPHEN 500 MG
650 TABLET ORAL EVERY 6 HOURS
Refills: 0 | Status: DISCONTINUED | OUTPATIENT
Start: 2022-05-24 | End: 2022-05-27

## 2022-05-24 RX ORDER — SODIUM CHLORIDE 9 MG/ML
1000 INJECTION INTRAMUSCULAR; INTRAVENOUS; SUBCUTANEOUS ONCE
Refills: 0 | Status: COMPLETED | OUTPATIENT
Start: 2022-05-24 | End: 2022-05-24

## 2022-05-24 RX ORDER — CEFTRIAXONE 500 MG/1
1000 INJECTION, POWDER, FOR SOLUTION INTRAMUSCULAR; INTRAVENOUS ONCE
Refills: 0 | Status: COMPLETED | OUTPATIENT
Start: 2022-05-24 | End: 2022-05-24

## 2022-05-24 RX ORDER — ACETAMINOPHEN 500 MG
2 TABLET ORAL
Qty: 0 | Refills: 0 | DISCHARGE

## 2022-05-24 RX ORDER — CEFUROXIME AXETIL 250 MG
500 TABLET ORAL ONCE
Refills: 0 | Status: COMPLETED | OUTPATIENT
Start: 2022-05-24 | End: 2022-05-24

## 2022-05-24 RX ORDER — CEFUROXIME AXETIL 250 MG
1 TABLET ORAL
Qty: 10 | Refills: 0
Start: 2022-05-24 | End: 2022-05-28

## 2022-05-24 RX ORDER — SODIUM CHLORIDE 9 MG/ML
1000 INJECTION INTRAMUSCULAR; INTRAVENOUS; SUBCUTANEOUS
Refills: 0 | Status: DISCONTINUED | OUTPATIENT
Start: 2022-05-24 | End: 2022-05-25

## 2022-05-24 RX ADMIN — PANTOPRAZOLE SODIUM 40 MILLIGRAM(S): 20 TABLET, DELAYED RELEASE ORAL at 21:04

## 2022-05-24 RX ADMIN — SODIUM CHLORIDE 75 MILLILITER(S): 9 INJECTION INTRAMUSCULAR; INTRAVENOUS; SUBCUTANEOUS at 23:00

## 2022-05-24 RX ADMIN — Medication 500 MILLIGRAM(S): at 12:52

## 2022-05-24 RX ADMIN — SODIUM CHLORIDE 2000 MILLILITER(S): 9 INJECTION INTRAMUSCULAR; INTRAVENOUS; SUBCUTANEOUS at 17:25

## 2022-05-24 NOTE — ED PROVIDER NOTE - NSFOLLOWUPINSTRUCTIONS_ED_ALL_ED_FT
Blink (air taxi) Micromedex® CareNotes®     :  [facility]  	      HEMATURIA - AfterCare(R) Instructions(ER/ED)     Hematuria    WHAT YOU NEED TO KNOW:    Hematuria is blood in your urine. Your urine may be bright red to dark brown.    DISCHARGE INSTRUCTIONS:    Return to the emergency department if:   •You have blood in your urine after a new injury, such as a fall.      •You have severe back or side pain that does not go away with treatment.      Call your doctor if:   •You are urinating very small amounts or not at all.      •You feel like you cannot empty your bladder.      •You have a fever that gets worse or does not go away with treatment.      •You cannot keep liquids or medicines down.      •Your urine gets darker, even after you drink extra liquids.      •You have questions or concerns about your condition, treatment, or care.      Drink liquids as directed: You may need to drink extra liquids to help flush the blood from your body through your urine. Water is the best liquid to drink. Ask how much liquid to drink each day and which liquids are best for you.    Follow up with your doctor as directed: Write down your questions so you remember to ask them during your visits.       © Copyright [employer] 2022       Please return to the Emergency Department immediately for any problems or concerns.   Return to ER immediately if no output from Gavin catheter  Follow up with urologist in 1-3 days. [employer] Micromedex® CareNotes®     :  [facility]  	      HEMATURIA - AfterCare(R) Instructions(ER/ED)     Hematuria    WHAT YOU NEED TO KNOW:    Hematuria is blood in your urine. Your urine may be bright red to dark brown.    DISCHARGE INSTRUCTIONS:    Return to the emergency department if:   •You have blood in your urine after a new injury, such as a fall.      •You have severe back or side pain that does not go away with treatment.      Call your doctor if:   •You are urinating very small amounts or not at all.      •You feel like you cannot empty your bladder.      •You have a fever that gets worse or does not go away with treatment.      •You cannot keep liquids or medicines down.      •Your urine gets darker, even after you drink extra liquids.      •You have questions or concerns about your condition, treatment, or care.      Drink liquids as directed: You may need to drink extra liquids to help flush the blood from your body through your urine. Water is the best liquid to drink. Ask how much liquid to drink each day and which liquids are best for you.    Follow up with your doctor as directed: Write down your questions so you remember to ask them during your visits.       © Copyright [employer] 2022       Please return to the Emergency Department immediately for any problems or concerns.   Return to ER immediately if no output from Gavin catheter  Follow up with urologist in 1-3 days.    Ceftin 1 tablet twice daily for 5 days.

## 2022-05-24 NOTE — H&P ADULT - NSHPLABSRESULTS_GEN_ALL_CORE
< from: CT Renal Stone Hunt (05.24.22 @ 18:02) >      PROCEDURE:  CT of the Abdomen and Pelvis was performed.  Sagittal and coronal reformats were performed.    FINDINGS:  LOWER CHEST: Small sliding hiatus hernia with thick-walled distal   esophagus..    LIVER: Within normal limits.  BILE DUCTS: Normal caliber.  GALLBLADDER: Within normal limits.  SPLEEN: Within normal limits.  PANCREAS: Within normal limits.  ADRENALS: Within normal limits.  KIDNEYS/URETERS: Atrophic kidneys with bilateral cysts and hypodensities   reidentified. Left hydroureteronephrosis slightly increased from prior.   Trace right hydronephrosis unchanged. BLADDER: Swelling high density in   the bladder consistent with blood. Gavin catheter in situ. Intraluminal   air introduced via catheter.  REPRODUCTIVE ORGANS: Markedly enlarged prostate 6.6 x 6.9 cm.    BOWEL: No bowel obstruction. Appendix is surgically absent. Stable1.6 x   1.2 cm residual phlegmon in the right lower quadrant (2:83). Sigmoid   diverticulosis without diverticulitis.  PERITONEUM: No ascites.  VESSELS: Within normal limits.CT STONE STUDY  RETROPERITONEUM/LYMPH NODES: Stable shotty gastrohepatic ligament, janet   hepatis and portacaval lymph nodes reidentified. Shotty right lower   quadrant mesenteric lymph nodes and para-aortic nodes.  ABDOMINAL WALL: Bilateral fat-containing inguinal hernias.  BONES: Degenerative changes.    IMPRESSION:  Intraluminal hemorrhage in the bladder. Gavin catheter in situ. Markedly   enlarged prostate reidentified.    Left hydroureteronephrosis slightly increased from prior. Bilateral renal   atrophy. Stable bilateral renal cysts and hypodensities.    Stable shotty intra-abdominal and retroperitoneal lymph nodes as above.    Status post appendectomy with stable small residual phlegmon.      < end of copied text >

## 2022-05-24 NOTE — REVIEW OF SYSTEMS
[Feeling Poorly] : not feeling poorly [Feeling Tired] : not feeling tired [Sore Throat] : sore throat [Hoarseness] : hoarseness [As Noted in HPI] : as noted in HPI [Anxiety] : no anxiety [Depression] : no depression [Negative] : Heme/Lymph [FreeTextEntry4] : Previous/ longstanding... [FreeTextEntry8] : Self catheterization for longstanding retention/ BPH disease...

## 2022-05-24 NOTE — ED CLERICAL - NS ED CLERK NOTE PRE-ARRIVAL INFORMATION; ADDITIONAL PRE-ARRIVAL INFORMATION
This patient is enrolled in the [facility] readmission reduction program and has active care navigation. This patient can be followed up by the care navigation team within 24 hours. To arrange close follow-up or to obtain additional clinical information about this patient, please call the contact number above.

## 2022-05-24 NOTE — ED ADULT NURSE REASSESSMENT NOTE - NS ED NURSE REASSESS COMMENT FT1
pt reavaluated and vital signs stable repeat cbc done pt with cbi in progress flush as needded admitted awaiting bed assignment
Gavin clogged,changed to 3way cath and attached to CBI and manually aspirated blood clots,Hao Hollins made aware

## 2022-05-24 NOTE — H&P ADULT - NSHPREVIEWOFSYSTEMS_GEN_ALL_CORE
bph with self casth for urinary retention  renal insuffiency cr about 4  highly none compliant  recent appendicities bph with self cath for urinary retention  renal insufficiency cr about 2.4 baseline  highly none compliant  recent appendicitis

## 2022-05-24 NOTE — ED ADULT TRIAGE NOTE - CHIEF COMPLAINT QUOTE
I was here yesterday and I had a castillo placed and the last time I saw urine in the bag was yesterday afternoon.  I am in agony

## 2022-05-24 NOTE — HISTORY OF PRESENT ILLNESS
[de-identified] : Very pleasant patient presenting to the office today in the company of his girlfriend/ long term partner.\par Mr. Aguillon is now s/p recent hospitalization and laparoscopic appendectomy for perforated appendicitis.\par He has been discharged to home on ongoing IV ABX in coordination with his ID consultant.\par Mr. Aguillon reports minimal serous drainage from his Josué drain of less than 10 cc of serous content daily.\par Presently, outside of minimal fatigue, he denies ongoing systemic complaints such as fever or chills, any abdominal pain or GI complaints... [de-identified] : Very pleasant patient presenting to the office today in the company of his girlfriend/ long term partner in ongoing follow-up.\par Mr. Aguillon is s/p hospitalization and laparoscopic appendectomy for perforated appendicitis with bacteremia.\par He is now s/p reassuring interval imaging with completion of his ABX and removal of his PICC..\par He continues to deny ongoing systemic complaints such as fever or chills, any abdominal discomfort/ pain.\par However, he is now willing to admit and discuss some GERD as well as his longstanding BPH/ urinary retention...

## 2022-05-24 NOTE — ED PROVIDER NOTE - PHYSICAL EXAMINATION
Gen: Awake, Alert, WD, WN, mild distress  Head:  NC/AT  Eyes:  PERRL, EOMI, Conjunctiva pink, lids normal, no scleral icterus  ENT: OP clear, missing teeth  Gastrointestinal/Abdomen:  Soft, nontender, nondistended, +BS, no rebound/guarding  : castillo in place no output  Skin: intact, no rash, no vesicles, no petechiae, no ecchymosis  Neuro:  AAOx3, sensation intact, motor 5/5 x 4 extremities, normal gait, speech clear

## 2022-05-24 NOTE — ED PROVIDER NOTE - CARE PLAN
1 Principal Discharge DX:	Gavin catheter problem  Secondary Diagnosis:	Hematuria   Principal Discharge DX:	Gavin catheter problem  Secondary Diagnosis:	Hematuria  Secondary Diagnosis:	BPH with urinary obstruction

## 2022-05-24 NOTE — H&P ADULT - PROBLEM SELECTOR PLAN 1
due to increased self catheterization  iv fluids  iv abxs  cont cbi  urology id eval  repeat cbc  ? may need transfusion  highly none compliant  I have seen him 2x ,as outpatient  does not follow care/recommendations

## 2022-05-24 NOTE — DATA REVIEWED
[FreeTextEntry1] : EXAM: [id number] - CT ABDOMEN AND PELVIS  - ORDERED BY: LUANN LOPEZ\par \par PROCEDURE DATE:  05/16/2022  \par \par INTERPRETATION:  CLINICAL INFORMATION: History of laparoscopic \par appendectomy for perforated appendicitis.\par \par COMPARISON: April 28, 2022.\par \par CONTRAST/COMPLICATIONS:\par IV Contrast: NONE\par Oral Contrast: Omnipaque 300\par Complications: None reported at time of study completion\par \par PROCEDURE:\par CT of the Abdomen and Pelvis was performed.\par Sagittal and coronal reformats were performed.\par \par FINDINGS:\par LOWER CHEST: Within normal limits.\par LIVER: Within normal limits. There are shotty gastrohepatic ligament, \par janet hepatis, and portacaval lymph nodes.\par BILE DUCTS: Normal caliber.\par GALLBLADDER: Within normal limits.\par SPLEEN: Within normal limits.\par PANCREAS: Within normal limits.\par ADRENALS: Within normal limits.\par KIDNEYS/URETERS: Again is noted a severely atrophic right kidney and \par moderately atrophic left kidney with cortical irregularity and scarring. \par Again are noted bilateral simple and proteinaceous renal cysts. Again is \par noted mild left hydroureteronephrosis extending to the bladder. No renal, \par ureteral, or bladder stones are identified.\par BLADDER: Within normal limits.\par REPRODUCTIVE ORGANS: Again is noted a markedly enlarged prostate \par invaginating the bladder base.\par BOWEL: Again is noted moderate concentric irregular wall thickening of \par the gastroesophageal junction with shotty adjacent lymph nodes.\par The patient is status post appendectomy. There is a residual within \par tracking fluid collection in the right lower quadrant extending to a 13 x \par 13 mm phlegmonous collection on image 99. No extraluminal gas or \par drainable collection is identified. There are shotty ileocolic lymph \par nodes.\par Small duodenal diverticulum. Sigmoid diverticulosis. Otherwise, small and \par large bowel loops are unremarkable with no evidence of obstruction, bowel \par wall thickening, or inflammatory stranding of the mesenteric fat.\par PERITONEUM: No ascites.\par VESSELS: Within normal limits.\par RETROPERITONEUM/LYMPH NODES: There is no retrocrural or retroperitoneal \par adenopathy. There are shotty bilateral iliac chain and inguinal lymph \par nodes. There are stable shotty para-aortic and paracaval lymph nodes.\par ABDOMINAL WALL: Small bilateral fat-containing inguinal hernias.\par BONES: Within normal limits.\par \par IMPRESSION:\par 1. Concentric wall thickening of the gastroesophageal junction with \par prominent adjacent lymph nodes. Recommend endoscopy to exclude \par gastroesophageal mass.\par 2. Atrophic kidneys with markedly enlarged prostate invaginating the \par bladder base and mild left hydroureteronephrosis.\par 3. Prominent iliac chain and inguinal lymph nodes.\par 4. Status post appendectomy with trace residual tracking collection. No \par evidence of drainable abscess.\par \par --- End of Report ---\par \par BALTAZAR ISAAC MD; Attending Radiologist \par This document has been electronically signed. May 16 2022  3:28PM\par \par

## 2022-05-24 NOTE — PLAN
[FreeTextEntry1] : S/P laparoscopic appendectomy for perforated appendicitis with localized fecal peritonitis and bacteremia.\par Mr. GONSALVES is clinically well by this history and surgically stable by this exam.\par There is no evidence by history or examination to suggest complication.\par The Pathology report and it's benign nature was re-reviewed in detail.\par By history, vitals and exam, Mr. Gonsalves is progressing well and generally reassuring CT discussed with ID and then patient and partner in detail.\par However, we have discussed directly the likely uncontrolled GERD which may be responsible for GE inflammation versus lesion.  He understands that an upper endoscopy is mandatory and I have referred him to a GI within [facility] to facilitate further evaluation and care.\par Similarly, we have discussed directly his poor controlled BPH with need for self catheterization with a risk of further permanent kidney damage.  I have referred him to a  within the community for further evaluation and care.\par Mr. GONSALVES  has been encouraged to call with questions or concerns at any time or with any difficulty in obtaining follow-up care.\par I remain available.\par Please note that more than 50% of face to face time was spent in counseling and coordination of care.

## 2022-05-24 NOTE — CONSULT NOTE ADULT - ASSESSMENT
Gross hematuria from traumatic urethral catheterization  Continue CBI until bleeding stops  Emphasized to patient that catheterization should be performed with liberal use of lubricant  
68YO M PMH BPH self caths,seen in ED yesterday after he self catheterized w[place]o use of lubricant [place], with ensuing gross hematuria.  Pt presented to ER and castillo was placed.  Pt returned today to ER with clot retention.  Castillo was switched to a 3 way catheter in the ER, and CBI was started. Pt denies fever chills n/v/d CP SOB. UA with hematuria. There was a concern for possible UTI.        CHRONIC KIDNEY DISEASE STAGE 3   and ACUTE RENAL FAILURE: Gross hematuria from traumatic urethral catheterization  Continue CBI until bleeding stops  sodium chloride 0.9%. 1000 milliLiter(s) (75 mL/Hr) IV   finasteride 5 milliGRAM(s) Oral daily  Serum creatinine is  at 2.4    , approximating GFR at   ml/min.   There is no progression . No uremic symptoms  No evidence of anemia .  Fluid status stable.  Will continue to avoid nephrotoxic drugs.  Patient remains asymptomatic.   Continue current therapy.  hold  diuretic.  hold   ACE inhibitor.  hold   ARB.      f/u  blood and urine cx,serial lactate levels,monitor vitals myrna monroe hydration,monitor urine output and renal profile,iv abx as per id cons

## 2022-05-24 NOTE — ED PROVIDER NOTE - CLINICAL SUMMARY MEDICAL DECISION MAKING FREE TEXT BOX
68yo M with BPH with indwelling castillo x 1 day sec to hematuria sec to self cath trauma without lubricant, c/o no urine output. flush castillo, castillo obstruction sec hematuria

## 2022-05-24 NOTE — PATIENT PROFILE ADULT - VISION (WITH CORRECTIVE LENSES IF THE PATIENT USUALLY WEARS THEM):
"INFECTIOUS DISEASE PROGRESS NOTE    Cecilio Farias  1955  [id number]    Date of Consult: 6/13/2019        Requesting Provider: No ref. provider found  Evaluating Physician: Oscar Kaur MD    Reason for Consultation: sepsis    History of present illness:  6/10/19:   Patient is a 63 y.o. male, with DM, VHD s/p AVR, CHF, seen today for sepsis.   He developed fever, chills, nausea, vomiting about 3 weeks ago,and was hospitalized at [facility] for 4  Days.  He was on IV antibiotics, and during the hospitalization had a GB ultrasound and Echocardiogram reportedly normal. He symptomatically improved and was discharged on Amoxicillin.  He developed a diffuse rash after 2 days and discontinued the Amoxicillin.  He had been feeling well until yesterday, when he began chilling, and experiencing nausea and vomiting.  He presented to the ED here where his temperature was 103 degrees.  Admitting labs with a leukocytosis of 16, 000, Scr 1.31, elevated inflammatory markers normal PCT and lactate.  An abdominal CT without acute abnormality, CXR clear.   He has  An ulcer on his right hallux that has been there for over a year, which began as a callous and his  Podiatrist has been debriding it.   6/11/19: Now with positive Gram negative bacteremia. Fever is better. He complains of nausea this am.  MRI hasn't been read yet this am.  No cough, dysuria, abdominal pain.  6/12/19:  Feeling pretty good.  No fever,chills, sweats.  No abdominal pain, shortness of breath.  6/13/19:  \"feel just fine:\".  No fever, night sweats.  Anxious to go home.  The MRCP did not reveal evidence of choledocholithiasis.    Past Medical History:   Diagnosis Date   • Cancer (CMS/HCC)     colon cancer with operation/ chemotherapy/radiation    • CHF (congestive heart failure) (CMS/HCC) 2009    Acute Systolic    • Chronic anticoagulation     coumadin   • Diabetes mellitus (CMS/HCC)    • H/O aortic valve replacement    • HLD (hyperlipidemia)    • " Hypertension        Past Surgical History:   Procedure Laterality Date   • AORTIC VALVE REPAIR/REPLACEMENT  2009    25 mm. [facility] [facility] AVR   • ASCENDING AORTIC ANEURYSM REPAIR  2009   • BACK SURGERY     • COLON SURGERY      Colon cancer with operation   • FOOT SURGERY      bilateral 2/2 wound and trauma        Family History   Problem Relation Age of Onset   • Heart disease Mother    • Hyperlipidemia Mother    • Diabetes Mother    • Leukemia Father    • Cancer Sister    • Kidney failure Brother    • Hepatitis Brother    • Cancer Sister    • Cancer Brother    • Heart disease Brother    • Heart attack Brother        Social History     Socioeconomic History   • Marital status:      Spouse name: Not on file   • Number of children: Not on file   • Years of education: Not on file   • Highest education level: Not on file   Tobacco Use   • Smoking status: Never Smoker   • Smokeless tobacco: Never Used   Substance and Sexual Activity   • Alcohol use: No   • Drug use: No   • Sexual activity: Defer       Allergies   Allergen Reactions   • Sulfa Antibiotics    • Amoxicillin Rash     Has tolerated ceftriaxone         Medication:  No current facility-administered medications for this encounter.     Current Outpatient Medications:   •  amLODIPine (NORVASC) 10 MG tablet, Take 10 mg by mouth daily., Disp: , Rfl:   •  aspirin 81 MG EC tablet, Take 81 mg by mouth daily., Disp: , Rfl:   •  carvedilol (COREG) 25 MG tablet, Take 25 mg by mouth 2 (two) times a day with meals., Disp: , Rfl:   •  gabapentin (NEURONTIN) 800 MG tablet, Take 800 mg by mouth 3 (Three) Times a Day., Disp: , Rfl:   •  glipiZIDE (GLUCOTROL) 5 MG tablet, Take 5 mg by mouth 2 (Two) Times a Day Before Meals., Disp: , Rfl:   •  lisinopril (PRINIVIL,ZESTRIL) 20 MG tablet, Take 20 mg by mouth 2 (two) times a day., Disp: , Rfl:   •  metFORMIN (GLUCOPHAGE) 500 MG tablet, Take 500 mg by mouth 2 (two) times a day with meals., Disp: , Rfl:   •  pravastatin (PRAVACHOL) 40  MG tablet, Take 40 mg by mouth Daily., Disp: , Rfl:   •  warfarin (COUMADIN) 10 MG tablet, Take 10 mg by mouth See Admin Instructions. 4 days a week on Sun, Tues, Thurs, Fri, Disp: , Rfl:   •  warfarin (COUMADIN) 7.5 MG tablet, Take 7.5 mg by mouth See Admin Instructions. 3 days a week on Mon , Wed, Sat, Disp: , Rfl:   •  insulin NPH-insulin regular (NOVOLIN 70/30) (70-30) 100 UNIT/ML injection, Inject 5 Units under the skin into the appropriate area as directed 2 (Two) Times a Day With Meals., Disp: 10 mL, Rfl: 1  •  meropenem (MERREM) 1 g/100 mL 0.9% NS VTB (mbp), Infuse 100 mL into a venous catheter Every 8 (Eight) Hours for 18 doses., Disp: 1800 mL, Rfl: 0    Antibiotics:  Anti-Infectives (From admission, onward)    Ordered     Dose/Rate Route Frequency Start Stop    19 1027  meropenem (MERREM) 1 g/100 mL 0.9% NS VTB (mbp)     Ordering Provider:  Annalisa Duarte MD    1 g  over 3 Hours Intravenous Every 8 Hours 19 0000 19 2359    19 0940  meropenem (MERREM) 1 g/100 mL 0.9% NS VTB (mbp)     Ordering Provider:  Oscar Kaur MD    1 g  over 30 Minutes Intravenous Once 19 1030 19 1206    19 1925  cefepime (MAXIPIME) 2 g/100 mL 0.9% NS (mbp)     Ordering Provider:  Oscar Kaur MD    2 g  200 mL/hr over 30 Minutes Intravenous Once 19 2015 19 2257    06/10/19 0216  aztreonam (AZACTAM) 2 g in sodium chloride 0.9 % 100 mL IVPB-MBP     Ordering Provider:  French Han MD    2 g  200 mL/hr over 30 Minutes Intravenous Once 06/10/19 0218 06/10/19 0316    06/10/19 0216  vancomycin 3000 mg/500 mL 0.9% NS IVPB ([facility])     Ordering Provider:  French Han MD    20 mg/kg × 145 kg Intravenous Once 06/10/19 0218 06/10/19 0316              Physical Exam:   Vital Signs  Temp (24hrs), Av.9 °F (36.6 °C), Min:97.9 °F (36.6 °C), Max:97.9 °F (36.6 °C)    Temp  Min: 97.9 °F (36.6 °C)  Max: 97.9 °F (36.6 °C)  BP  Min: 129/65  Max: 130/67  Pulse   Min: 73  Max: 85  Resp  Min: 18  Max: 18  SpO2  Min: 95 %  Max: 95 %    GENERAL: Awake and alert, in no acute distress.   HEENT: Normocephalic, atraumatic.  PERRL. EOMI. No conjunctival injection. No icterus. Oropharynx clear without evidence of thrush or exudate. No evidence of peridontal disease.    NECK: Supple without nuchal rigidity. No mass.  LYMPH: No cervical, axillary or inguinal lymphadenopathy.  HEART: RRR; No murmur, rubs, gallops. + prosthetic click   LUNGS: Clear to auscultation bilaterally without wheezing, rales, rhonchi. Normal respiratory effort. Nonlabored. No dullness.  ABDOMEN: Soft, nontender, nondistended. Positive bowel sounds. No rebound or guarding. NO mass or HSM.  EXT:  No cyanosis, clubbing or edema. Venous stasis discoloration noted  + Peripheral pulse   : Without Adam catheter.  MSK: FROM without joint effusions noted arms/legs.    SKIN: Warm and dry.    NEURO: Oriented to PPT. No focal deficits on motor/sensory exam at arms/legs.  PSYCHIATRIC: Normal insight and judgement. Cooperative with PE  Right hallux with ulceration plantar surface, calloused no drainage, with faint erythema dorsum of foot   Laboratory Data    Results from last 7 days   Lab Units 06/13/19  0332 06/11/19  0857 06/10/19  0415   WBC 10*3/mm3 7.47 11.55* 19.30*   HEMOGLOBIN g/dL 11.4* 10.9* 10.2*   HEMATOCRIT % 38.0 36.7* 34.3*   PLATELETS 10*3/mm3 293 251 263     Results from last 7 days   Lab Units 06/13/19  0332   SODIUM mmol/L 143   POTASSIUM mmol/L 4.2   CHLORIDE mmol/L 106   CO2 mmol/L 26.0   BUN mg/dL 20   CREATININE mg/dL 0.98   GLUCOSE mg/dL 185*   CALCIUM mg/dL 8.9     Results from last 7 days   Lab Units 06/10/19  0103   ALK PHOS U/L 68   BILIRUBIN mg/dL 0.6   ALT (SGPT) U/L 15   AST (SGOT) U/L 22     Results from last 7 days   Lab Units 06/10/19  0103   SED RATE mm/hr 89*     Results from last 7 days   Lab Units 06/10/19  0415   CRP mg/dL 1.93*     Results from last 7 days   Lab Units 06/10/19  4159    LACTATE mmol/L 2.0             Estimated Creatinine Clearance: 120.5 mL/min (by C-G formula based on SCr of 0.98 mg/dL).      Microbiology:         Influenza  Negative  6/10: BC  (2/2) ABNORMAL  Gram negative bacilli, no organism by PCR -- Acinetobacter baumannii   sensitive to Merrem, Tetracycline    [facility] :  5/21:  BC no growth                   Radiology:  Imaging Results (last 72 hours)     Procedure Component Value Units Date/Time    XR Chest 1 View [[id number]] Collected:  06/10/19 0213     Updated:  06/10/19 0215    Narrative:       CR Chest 1 Vw    SIGNS AND SYMPTOMS:  fever 103 fever for a couple days with fatigue and vomiting.  Hx of CABG 10 years ago, HTN, diabetic     COMPARISONS:  CT angiogram of the chest 12/27/2009, chest radiograph 12/27/2009    FINDINGS:    A portable AP view of the chest was obtained  Semiupright.    The lungs are clear. There is marked cardiomegaly even accounting for AP technique. Aortic valve has been replaced utilizing a mechanical prosthetic. There are postsurgical changes of median sternotomy. No pneumothorax or pleural effusion is identified.  The bones are normal for age.      Impression:         1.  No acute abnormality.    Signer Name: Sony Lawson MD   Signed: 6/10/2019 2:13 AM   Workstation Name: [id number]       CT Abdomen Pelvis Without Contrast [[id number]] Collected:  06/10/19 0046     Updated:  06/10/19 0049    Narrative:       CT Abdomen Pelvis WO 6/10/2019 1:34 AM    SIGNS AND SYMPTOMS:  Fever, n/v starting today, no abd pain; Hx colon ca w/ colon resection; Recent hospital admission for similar symptoms    COMPARISON:  None available    TECHNIQUE:  Axial imaging of the abdomen and pelvis was performed without the administration of intravenous contrast. No enteric contrast was administered. Multiplanar reformation was obtained. Radiation dose reduction techniques included automated exposure control  or exposure modulation based on body size. Radiation  audit for number of CT and nuclear cardiology exams performed in the last year: 0.    FINDINGS:  Lower chest: There are postsurgical changes of median sternotomy and coronary artery bypass. Coronary atherosclerosis is present.    Hepatobiliary: The liver is normal in size. There are gallstones seen within the gallbladder.There is no CT evidence of acute cholecystitis.     Spleen, pancreas, adrenal glands: No abnormalities.    Kidneys, ureters, bladder: No abnormalities.    Bowel: There is an anastomotic site seen within left colon in the left lower quadrant (series 4#31 for example). This appears widely patent. A second anastomotic site is seen within the right colon (series 2#42 for example). This anastomotic site also  appears widely patent. The small bowel and stomach are otherwise unremarkable.    Peritoneal cavity / Subperitoneal space: No abnormalities.    Lymphovascular: Atherosclerotic vascular calcifications are present within the descending aorta and iliac arteries. No retroperitoneal adenopathy.    Abdominal wall: There are bowel containing ventral abdominal hernias (series 5#25 for example). These contain loops of both small bowel and colon. There is no evidence of obstruction of the loops involved within the ventral abdominal hernias.    Musculoskeletal: Degenerative disc disease lumbar spine. No destructive bony lesion.    Prostate, seminal vesicles: There is calcification of the vas deferens, nonspecific but can be seen in the setting of diabetes mellitus. Prostate normal in size. No abnormality of the seminal vesicles.      Impression:       1.  No acute intra-abdominal abnormality. Anastomotic sites within the right and left colon appear widely patent.  2.  Multiple bowel containing ventral abdominal hernias. There is no evidence of obstruction of the small bowel loops or colonic loops associated with these findings.  3.  Atherosclerosis.    Signer Name: Sony Lawson MD   Signed: 6/10/2019 12:46  AM   Workstation Name: [id number]           5/23:  Echo:  EF ~55-60% normal diastolic function./   Moderate mitral annular calcifications, thickening and calicification of the mitral valve  Leaflet . No evidence of vegetation.   6/11:  MRI    Impression:     Soft tissue irregularity and minimal soft tissue edema and  inflammation surrounding the distal great toe lateral margin without  evidence for cortical irregularity to suggest osteomyelitis and no  evidence for abscess formation. Soft tissue irregularity corresponds to  clinical history of ulceration.           Impression:   1.  Acinetobacter  bacteremia- he had 2+ blood cultures for Acinetobacter.  This organism is sensitive to penicillins but he is allergic to penicillins.  His ESTHER does not show a vegetation but this does not exclude prosthetic valve endocarditis.   He will  require 6 weeks of intravenous antibiotic therapy followed by chronic oral minocycline suppression.  I will plan for him to discharge today after PICC line placement.  2.  Sepsis-improved  3.  Right hallux callus/ cellulitis-with no evidence of osteomyelitis by MRI  4.  Valvular heart disease s/p AVR   5.  Peripheral neuropathy  6.  Acute kidney injury/ATN  7.  History of colon cancer/resection  8.  Penicillin allergy-he had a recent extensive rash secondary to amoxicillin.  9.  Gram negative bacteremia   10.  [facility]olith[facility] by CT at [facility]  11.  Type 2 diabetes mellitus    PLAN/RECOMMENDATIONS:   1.  Repeat blood culture x1, pending   2.  Intravenous Merrem  3.  PICC line placement      Dr. Kaur has obtained the history, performed the physical exam and formulated the above treatment plan.       OUTPATIENT IV ANTIBIOTICS:  1.  Merrem 1g q 8 hours- [facility] to provide  2.  Weekly PICC line dressing change at outpatient oncology  3.  Follow up with Dr. Kaur 6/20 at 2:15p  appt mad  4.  Stat CBC CMP ESR CRP at time of appt     UM/ALLEN: I coordinated his care today.  I discussed his  disposition with Dr. Nix and also with [facility] infusion clinical pharmacy.    Oscar Kaur MD  6/13/2019  6:00 PM                 wears glasses/Partially impaired: cannot see medication labels or newsprint, but can see obstacles in path, and the surrounding layout; can count fingers at arm's length

## 2022-05-24 NOTE — CONSULT NOTE ADULT - SUBJECTIVE AND OBJECTIVE BOX
HPI:  66YO M PMH BPH self caths,seen in ED yesterday after he self catheterized w[place]o use of [place], with ensuing gross hematuria.  Pt presented to ER and castillo was placed.  Pt returned today to ER with clot retention.  Castillo was switched to a 3 way catheter in the ER, and CBI was started. Pt denies fever chills n/v/d CP SOB. UA with hematuria. There was a concern for possible UTI.    Infectious Disease consult was called to evaluate pt and for antibiotic management.    Past Medical & Surgical Hx:  PAST MEDICAL & SURGICAL HISTORY:  Gout  Enlarged prostate  No significant past surgical history      Social History--  EtOH: denies   Tobacco: denies   Drug Use: denies     FAMILY HISTORY:  Noncontributory    Allergies  No Known Allergies    Intolerances  NONE      Home Medications:  Albuterol (Eqv-ProAir HFA) 90 mcg/inh inhalation aerosol: 2 puff(s) inhaled 4 times a day, As Needed (24 May 2022 17:23)  fluticasone 50 mcg/inh nasal spray: 1 spray(s) nasal 2 times a day, As Needed (24 May 2022 17:23)      Current Inpatient Medications :    ANTIBIOTICS:   cefTRIAXone   IVPB 1000 milliGRAM(s) IV Intermittent once      OTHER RELEVANT MEDICATIONS :  acetaminophen     Tablet .. 650 milliGRAM(s) Oral every 6 hours PRN  finasteride 5 milliGRAM(s) Oral daily  pantoprazole  Injectable 40 milliGRAM(s) IV Push daily  sodium chloride 0.9%. 1000 milliLiter(s) IV Continuous <Continuous>    ROS:  CONSTITUTIONAL:  Negative fever or chills  EYES:  Negative  blurry vision or double vision  CARDIOVASCULAR:  Negative for chest pain or palpitations  RESPIRATORY:  Negative for cough, wheezing, or SOB   GASTROINTESTINAL:  Negative for nausea, vomiting, diarrhea, constipation, or abdominal pain  GENITOURINARY:  Negative frequency, urgency , dysuria + hematuria   NEUROLOGIC:  No headache, confusion, dizziness, lightheadedness  All other systems were reviewed and are negative      Physical Exam:  Vital Signs Last 24 Hrs  T(C): 36.7 (24 May 2022 20:03), Max: 36.7 (24 May 2022 12:19)  T(F): 98 (24 May 2022 20:03), Max: 98 (24 May 2022 12:19)  HR: 67 (24 May 2022 20:03) (67 - 112)  BP: 137/85 (24 May 2022 20:03) (130/67 - 164/91)  RR: 16 (24 May 2022 20:03) (16 - 20)  SpO2: 99% (24 May 2022 20:03) (97% - 99%)  Height (cm): 175.3 ( @ 08:49)  Weight (kg): 79.4 ( @ 08:49)  BMI (kg/m2): 25.8 ( @ 08:49)  BSA (m2): 1.95 ( @ 08:49)    General: no acute distress  Neck: supple, trachea midline  Lungs: clear, no wheeze/rhonchi  Cardiovascular: regular rate and rhythm, S1 S2  Abdomen: soft, nontender, ND, bowel sounds normal  : CBI with hematuria  Neurological:  alert and oriented x3  Skin: no rash  Extremities: no cedema    Labs:                         11.7   13.51 )-----------( 348      ( 24 May 2022 19:31 )             37.1       138  |  109<H>  |  34<H>  ----------------------------<  117<H>  3.9   |  23  |  2.40<H>    Ca    9.2      24 May 2022 13:54    TPro  6.7  /  Alb  3.0<L>  /  TBili  1.0  /  DBili  x   /  AST  14<L>  /  ALT  19  /  AlkPhos  84      Urinalysis Basic - ( 23 May 2022 17:07 )    Color: Red / Appearance: bloody / S.005 / pH: x  Gluc: x / Ketone: Negative  / Bili: Negative / Urobili: Negative   Blood: x / Protein: 500 mg/dL / Nitrite: Negative   Leuk Esterase: Negative / RBC: >50 /HPF / WBC x   Sq Epi: x / Non Sq Epi: Occasional / Bacteria: Occasional      RECENT CULTURES:          RADIOLOGY & ADDITIONAL STUDIES:    ACC: [id number] EXAM:  CT RENAL STONE HUNT                          PROCEDURE DATE:  2022          INTERPRETATION:  CLINICAL INFORMATION: 67 years  Male with hematuria    Hematuria, unknown cause  hematuria.    COMPARISON: 2022    CONTRAST/COMPLICATIONS:  IV Contrast: NONE  0 cc administered   0 cc discarded  Oral Contrast: NONE  Complications: None reported at time of study completion    PROCEDURE:  CT of the Abdomen and Pelvis was performed.  Sagittal and coronal reformats were performed.    FINDINGS:  LOWER CHEST: Small sliding hiatus hernia with thick-walled distal   esophagus..    LIVER: Within normal limits.  BILE DUCTS: Normal caliber.  GALLBLADDER: Within normal limits.  SPLEEN: Within normal limits.  PANCREAS: Within normal limits.  ADRENALS: Within normal limits.  KIDNEYS/URETERS: Atrophic kidneys with bilateral cysts and hypodensities   reidentified. Left hydroureteronephrosis slightly increased from prior.   Trace right hydronephrosis unchanged. BLADDER: Swelling high density in   the bladder consistent with blood. Castillo catheter in situ. Intraluminal   air introduced via catheter.  REPRODUCTIVE ORGANS: Markedly enlarged prostate 6.6 x 6.9 cm.    BOWEL: No bowel obstruction. Appendix is surgically absent. Stable1.6 x   1.2 cm residual phlegmon in the right lower quadrant (2:83). Sigmoid   diverticulosis without diverticulitis.  PERITONEUM: No ascites.  VESSELS: Within normal limits.  RETROPERITONEUM/LYMPH NODES: Stable shotty gastrohepatic ligament, janet   hepatis and portacaval lymph nodes reidentified. Shotty right lower   quadrant mesenteric lymph nodes and para-aortic nodes.  ABDOMINAL WALL: Bilateral fat-containing inguinal hernias.  BONES: Degenerative changes.    IMPRESSION:  Intraluminal hemorrhage in the bladder. Castillo catheter in situ. Markedly   enlarged prostate reidentified.    Left hydroureteronephrosis slightly increased from prior. Bilateral renal   atrophy. Stable bilateral renal cysts and hypodensities.    Stable shotty intra-abdominal and retroperitoneal lymph nodes as above.    Status post appendectomy with stable small residual phlegmon.      Assessment :   66YO M PMH BPH self caths, seen in ED yesterday after he self catheterized w/o use of [place]ant [place], with ensuing gross hematuria.  Pt presented to ER and castillo was placed.  Pt returned today to ER with clot retention.  Castillo was switched to a 3 way catheter in the ER, and CBI was started. UA with hematuria. There was a concern for possible UTI. CT AP with Left hydroureteronephrosis slightly increased from prior. WBC 13K  Doubt UTI  Ucx not sent and pt is currently on CBI so unable to obtain  Leukocytosis reactive    Plan :   Rocephin x 1 dose given  Monitor off antibiotics  CBI per urology  Urology eval noted  Trend temps and cbc  CBI per urology    Continue with present regiment .  Approptiate use of antibiotics and adverse effects reviewed.      I have discussed the above plan of care with patient/wife in detail. They expressed understanding of the treatment plan . Risks, benefits and alternatives discussed in detail. I have asked if they have any questions or concerns and appropriately addressed them to the best of my ability .      > 45 minutes spent in direct patient care reviewing  the notes, lab data/ imaging , discussion with multidisciplinary team. All questions were addressed and answered to the best of my capacity .    Thank you for allowing me to participate in the care of your patient .      Catherine Navarrete MD  Infectious Disease  [phone]
  Patient is a 67y Male whom presented to the hospital with ckd and aaron     PAST MEDICAL & SURGICAL HISTORY:  Gout      Enlarged prostate      No significant past surgical history          MEDICATIONS  (STANDING):  finasteride 5 milliGRAM(s) Oral daily  pantoprazole  Injectable 40 milliGRAM(s) IV Push daily  sodium chloride 0.9%. 1000 milliLiter(s) (75 mL/Hr) IV Continuous <Continuous>      Allergies    No Known Allergies    Intolerances        SOCIAL HISTORY:  Denies ETOh,Smoking,     FAMILY HISTORY:      REVIEW OF SYSTEMS:    CONSTITUTIONAL: No weakness, fevers or chills  RESPIRATORY: No cough, wheezing, hemoptysis; No shortness of breath  CARDIOVASCULAR: No chest pain or palpitations  GASTROINTESTINAL: No abdominal or epigastric pain. No nausea, vomiting,     No diarrhea or constipation. No melena   GENITOURINARY: No dysuria, frequency or hematuria  SKIN: dry      VITAL:  T(C): , Max: 37.6 (22 @ 22:57)  T(F): , Max: 99.7 (22 @ 22:57)  HR: 83 (22 @ 05:35)  BP: 93/60 (22 @ 06:26)  BP(mean): --  RR: 17 (22 @ 05:35)  SpO2: 93% (22 @ 05:35)  Wt(kg): --    I and O's:     @ 07:01  -   @ 06:33  --------------------------------------------------------  IN: 6150 mL / OUT: 6950 mL / NET: -800 mL      Height (cm): 175.3 ( @ 08:49)  Weight (kg): 79.4 ( 08:49)  BMI (kg/m2): 25.8 ( 08:49)  BSA (m2): 1.95 ( 08:49)    PHYSICAL EXAM:    Constitutional: NAD  HEENT: conjunctive   clear   Neck:  No JVD  Respiratory: CTAB  Cardiovascular: S1 and S2  Gastrointestinal: BS+, soft, NT/ND  Extremities: No peripheral edema  Neurological: A/O x 3, no focal deficits    LABS:                        11.7   13.51 )-----------( 348      ( 24 May 2022 19:31 )             37.1         138  |  109<H>  |  34<H>  ----------------------------<  117<H>  3.9   |  23  |  2.40<H>    Ca    9.2      24 May 2022 13:54    TPro  6.7  /  Alb  3.0<L>  /  TBili  1.0  /  DBili  x   /  AST  14<L>  /  ALT  19  /  AlkPhos  84        Urine Studies:  Urinalysis Basic - ( 23 May 2022 17:07 )    Color: Red / Appearance: bloody / S.005 / pH: x  Gluc: x / Ketone: Negative  / Bili: Negative / Urobili: Negative   Blood: x / Protein: 500 mg/dL / Nitrite: Negative   Leuk Esterase: Negative / RBC: >50 /HPF / WBC x   Sq Epi: x / Non Sq Epi: Occasional / Bacteria: Occasional            RADIOLOGY & ADDITIONAL STUDIES:            MEDICATIONS  (STANDING):  finasteride 5 milliGRAM(s) Oral daily  pantoprazole  Injectable 40 milliGRAM(s) IV Push daily  sodium chloride 0.9%. 1000 milliLiter(s) (75 mL/Hr) IV Continuous <Continuous>      
Patient is a 67y old  Male who presents with a chief complaint of gross hematuria    HISTORY OF PRESENT ILLNESS:  68 y/o male known to me from recent admission earlier this month for appendicitis, s/p appendectomy.  Pt with h/o BPH with a markedly enlarged prostate, and has been self catheterizing for years for urinary retention.  He has not been under the care of urologist for quite some time now.  Pt self catheterized w/o use of lubricant [place], with ensuing gross hematuria.  Pt presented to ER yesterday, where a castillo was placed.  Pt returned today to ER with clot retention.  Castillo was switched to a 3 way catheter in the ER, and CBI was started.  Effluent from slow CBI is light pink in color.  Offered to start pt on finasteride to shrink prostate and decrease prostate vascularity, but he is declining and states that he will seek urological care when he is discharged from hospital.      PAST MEDICAL & SURGICAL HISTORY:  Gout      Enlarged prostate      Appendectomy      REVIEW OF SYSTEMS:    CONSTITUTIONAL: No weakness, fevers or chills  SKIN: No itching, burning, rashes, or lesions   EYES/ENT: No visual changes;  No vertigo or throat pain   NECK: No pain or stiffness  RESPIRATORY: No cough, wheezing, hemoptysis; No shortness of breath  CARDIOVASCULAR: No chest pain or palpitations  GASTROINTESTINAL: No abdominal or epigastric pain. No nausea, vomiting, diarrhea  NEUROLOGICAL: No numbness or weakness  GENITOURINARY:     No hematuria, dysuria, incontinence    All other review of systems is negative unless indicated above.    MEDICATIONS  (STANDING):  cefTRIAXone   IVPB 1000 milliGRAM(s) IV Intermittent once    MEDICATIONS  (PRN):      Allergies    No Known Allergies    Intolerances        SOCIAL HISTORY:   Smoking: denies   EtOH: denies   Work:       FAMILY HISTORY:      Vital Signs Last 24 Hrs  T(C): 36.5 (24 May 2022 18:30), Max: 36.7 (24 May 2022 12:19)  T(F): 97.7 (24 May 2022 18:30), Max: 98 (24 May 2022 12:19)  HR: 88 (24 May 2022 18:30) (62 - 112)  BP: 130/81 (24 May 2022 18:30) (130/67 - 164/91)  BP(mean): --  RR: 16 (24 May 2022 18:30) (16 - 20)  SpO2: 97% (24 May 2022 18:30) (97% - 98%)    PHYSICAL EXAM:    Constitutional: NAD, well-developed  HEENT: PERRLA, EOMI  Neck: Supple, full ROM  Back: No CVA tenderness  Respiratory: Normal repiratory effort  Cardiovascular: RRR  Abd: Soft, NT/ND  : Castillo draining bloody urine.  CBI in progress  Neurological: A&O x 3, no focal deficits  Psychiatric: Normal mood, normal affect  Skin: No rashes    LABS:                        10.7   8.90  )-----------( 316      ( 24 May 2022 13:54 )             33.9         138  |  109<H>  |  34<H>  ----------------------------<  117<H>  3.9   |  23  |  2.40<H>    Ca    9.2      24 May 2022 13:54    TPro  6.7  /  Alb  3.0<L>  /  TBili  1.0  /  DBili  x   /  AST  14<L>  /  ALT  19  /  AlkPhos  84      PT/INR - ( 24 May 2022 13:54 )   PT: 12.9 sec;   INR: 1.10 ratio         PTT - ( 24 May 2022 13:54 )  PTT:30.3 sec  Urinalysis Basic - ( 23 May 2022 17:07 )    Color: Red / Appearance: bloody / S.005 / pH: x  Gluc: x / Ketone: Negative  / Bili: Negative / Urobili: Negative   Blood: x / Protein: 500 mg/dL / Nitrite: Negative   Leuk Esterase: Negative / RBC: >50 /HPF / WBC x   Sq Epi: x / Non Sq Epi: Occasional / Bacteria: Occasional      Urine Culture:       RADIOLOGY & ADDITIONAL STUDIES:    ACC: [id number] EXAM:  CT RENAL STONE HUNT                          PROCEDURE DATE:  2022          INTERPRETATION:  CLINICAL INFORMATION: 67 years  Male with hematuria    Hematuria, unknown cause  hematuria.    COMPARISON: 2022    CONTRAST/COMPLICATIONS:  IV Contrast: NONE  0 cc administered   0 cc discarded  Oral Contrast: NONE  Complications: None reported at time of study completion    PROCEDURE:  CT of the Abdomen and Pelvis was performed.  Sagittal and coronal reformats were performed.    FINDINGS:  LOWER CHEST: Small sliding hiatus hernia with thick-walled distal   esophagus..    LIVER: Within normal limits.  BILE DUCTS: Normal caliber.  GALLBLADDER: Within normal limits.  SPLEEN: Within normal limits.  PANCREAS: Within normal limits.  ADRENALS: Within normal limits.  KIDNEYS/URETERS: Atrophic kidneys with bilateral cysts and hypodensities   reidentified. Left hydroureteronephrosis slightly increased from prior.   Trace right hydronephrosis unchanged. BLADDER: Swelling high density in   the bladder consistent with blood. Castillo catheter in situ. Intraluminal   air introduced via catheter.  REPRODUCTIVE ORGANS: Markedly enlarged prostate 6.6 x 6.9 cm.    BOWEL: No bowel obstruction. Appendix is surgically absent. Stable1.6 x   1.2 cm residual phlegmon in the right lower quadrant (2:83). Sigmoid   diverticulosis without diverticulitis.  PERITONEUM: No ascites.  VESSELS: Within normal limits.  RETROPERITONEUM/LYMPH NODES: Stable shotty gastrohepatic ligament, janet   hepatis and portacaval lymph nodes reidentified. Shotty right lower   quadrant mesenteric lymph nodes and para-aortic nodes.  ABDOMINAL WALL: Bilateral fat-containing inguinal hernias.  BONES: Degenerative changes.    IMPRESSION:  Intraluminal hemorrhage in the bladder. Castillo catheter in situ. Markedly   enlarged prostate reidentified.    Left hydroureteronephrosis slightly increased from prior. Bilateral renal   atrophy. Stable bilateral renal cysts and hypodensities.    Stable shotty intra-abdominal and retroperitoneal lymph nodes as above.    Status post appendectomy with stable small residual phlegmon.

## 2022-05-24 NOTE — PHYSICAL EXAM
[Respiratory Effort] : normal respiratory effort [Normal Rate and Rhythm] : normal rate and rhythm [No HSM] : no hepatosplenomegaly [Tender] : was nontender [Enlarged] : not enlarged [No Rash or Lesion] : No rash or lesion [Alert] : alert [Oriented to Person] : oriented to person [Oriented to Place] : oriented to place [Oriented to Time] : oriented to time [Calm] : calm [de-identified] : Appears well, no acute distress, ambulates easily into the office. [de-identified] : Remains normocephalic and atraumatic.  [de-identified] : Still supple with full range of motion.  [FreeTextEntry1] : No palpable cervical, supraclavicular, axillary or inguinal adenopathy.  [de-identified] : No visible lesion or palpable mass. [de-identified] : RLQ without visible fullness- no changes.\par Lower abdomen without palpable fullness- all stable.\par Entire abdomen non tender to deep palpation- no changes.\par All operative incisions clean, dry and intact. [de-identified] : Normal external genitalia; no clinically apparent hernias today. [de-identified] : Deferred.  [de-identified] : Grossly symmetric and within normal limits without motor or sensory deficit.

## 2022-05-24 NOTE — ED PROVIDER NOTE - CARE PROVIDER_API CALL
Rashaun Palumbo)  Urology  [address] [place] [address], [address]  [place] [address]  Phone: [phone]  Fax: [phone]  Established Patient  Follow Up Time: 1-3 Days

## 2022-05-24 NOTE — ED PROVIDER NOTE - OBJECTIVE STATEMENT
66yo M with BPH, self catheterizes but did nto have lubricant so was using water, developed bleeding. came to ED 1 day ago, castillo placed. pt st no UO and feels pressure. no fever, chills, cp, sob, abd pain, n/v/d. no anticoagulants.  uro= tsaio

## 2022-05-24 NOTE — H&P ADULT - REASON FOR ADMISSION
self catheterization urine while in park,,began to bleed self catheterization urine while in park without lubricant,,,began to bleed profusely

## 2022-05-24 NOTE — ED ADULT NURSE NOTE - NSIMPLEMENTINTERV_GEN_ALL_ED
Implemented All Universal Safety Interventions:  [place] to call system. Call bell, personal items and telephone within reach. Instruct patient to call for assistance. Room bathroom lighting operational. Non-slip footwear when patient is off stretcher. Physically safe environment: no spills, clutter or unnecessary equipment. Stretcher in lowest position, wheels locked, appropriate side rails in place.

## 2022-05-24 NOTE — PATIENT PROFILE ADULT - FALL HARM RISK - RISK INTERVENTIONS
Assistance with ambulation/Communicate Fall Risk and Risk Factors to all staff, patient, and family/Reinforce activity limits and safety measures with patient and family/Use of alarms - bed, chair and/or voice tab/Visual Cue: Yellow wristband/Bed in lowest position, wheels locked, appropriate side rails in place/Call bell, personal items and telephone in reach/Instruct patient to call for assistance before getting out of bed or chair/Non-slip footwear when patient is out of bed/[place] to call system/Physically safe environment - no spills, clutter or unnecessary equipment/Purposeful Proactive Rounding/Room/bathroom lighting operational, light cord in reach

## 2022-05-24 NOTE — ED PROVIDER NOTE - PROGRESS NOTE DETAILS
RN Remigio flushed castillo with good return of bloody urine. pt st feels much better now. pt NAD, castillo draining well, hematuria persists however lighter in color. pt st hungry. pt knows he requires follow up with urology. hematuria persists. case d/w Dr. Mills. for medical amission. CT [place] ordered. paged Dr. Palumbo (urology). hematuria persists. case d/w Dr. Mills. for medical amission. CT stone [place] ordered. d/w Dr. Aldridge, urology, for consult. he recommends continuing CBI.

## 2022-05-25 LAB
A1C WITH ESTIMATED AVERAGE GLUCOSE RESULT: 5.2 % — SIGNIFICANT CHANGE UP (ref 4–5.6)
ALBUMIN SERPL ELPH-MCNC: 2.7 G/DL — LOW (ref 3.3–5)
ALP SERPL-CCNC: 83 U/L — SIGNIFICANT CHANGE UP (ref 40–120)
ALT FLD-CCNC: 14 U/L — SIGNIFICANT CHANGE UP (ref 12–78)
ANION GAP SERPL CALC-SCNC: 9 MMOL/L — SIGNIFICANT CHANGE UP (ref 5–17)
AST SERPL-CCNC: 8 U/L — LOW (ref 15–37)
BASOPHILS # BLD AUTO: 0.04 K/UL — SIGNIFICANT CHANGE UP (ref 0–0.2)
BASOPHILS NFR BLD AUTO: 0.3 % — SIGNIFICANT CHANGE UP (ref 0–2)
BILIRUB SERPL-MCNC: 1.3 MG/DL — HIGH (ref 0.2–1.2)
BUN SERPL-MCNC: 35 MG/DL — HIGH (ref 7–23)
CALCIUM SERPL-MCNC: 8.9 MG/DL — SIGNIFICANT CHANGE UP (ref 8.5–10.1)
CHLORIDE SERPL-SCNC: 109 MMOL/L — HIGH (ref 96–108)
CO2 SERPL-SCNC: 22 MMOL/L — SIGNIFICANT CHANGE UP (ref 22–31)
CREAT SERPL-MCNC: 2.6 MG/DL — HIGH (ref 0.5–1.3)
EGFR: 26 ML/MIN/1.73M2 — LOW
EOSINOPHIL # BLD AUTO: 0.09 K/UL — SIGNIFICANT CHANGE UP (ref 0–0.5)
EOSINOPHIL NFR BLD AUTO: 0.6 % — SIGNIFICANT CHANGE UP (ref 0–6)
ESTIMATED AVERAGE GLUCOSE: 103 MG/DL — SIGNIFICANT CHANGE UP (ref 68–114)
GLUCOSE SERPL-MCNC: 94 MG/DL — SIGNIFICANT CHANGE UP (ref 70–99)
HCT VFR BLD CALC: 30.1 % — LOW (ref 39–50)
HGB BLD-MCNC: 9.5 G/DL — LOW (ref 13–17)
IMM GRANULOCYTES NFR BLD AUTO: 0.5 % — SIGNIFICANT CHANGE UP (ref 0–1.5)
LYMPHOCYTES # BLD AUTO: 1.99 K/UL — SIGNIFICANT CHANGE UP (ref 1–3.3)
LYMPHOCYTES # BLD AUTO: 14.1 % — SIGNIFICANT CHANGE UP (ref 13–44)
MCHC RBC-ENTMCNC: 28.3 PG — SIGNIFICANT CHANGE UP (ref 27–34)
MCHC RBC-ENTMCNC: 31.6 GM/DL — LOW (ref 32–36)
MCV RBC AUTO: 89.6 FL — SIGNIFICANT CHANGE UP (ref 80–100)
MONOCYTES # BLD AUTO: 1.45 K/UL — HIGH (ref 0–0.9)
MONOCYTES NFR BLD AUTO: 10.3 % — SIGNIFICANT CHANGE UP (ref 2–14)
NEUTROPHILS # BLD AUTO: 10.47 K/UL — HIGH (ref 1.8–7.4)
NEUTROPHILS NFR BLD AUTO: 74.2 % — SIGNIFICANT CHANGE UP (ref 43–77)
NRBC # BLD: 0 /100 WBCS — SIGNIFICANT CHANGE UP (ref 0–0)
PLATELET # BLD AUTO: 305 K/UL — SIGNIFICANT CHANGE UP (ref 150–400)
POTASSIUM SERPL-MCNC: 4.2 MMOL/L — SIGNIFICANT CHANGE UP (ref 3.5–5.3)
POTASSIUM SERPL-SCNC: 4.2 MMOL/L — SIGNIFICANT CHANGE UP (ref 3.5–5.3)
PROT SERPL-MCNC: 6.6 G/DL — SIGNIFICANT CHANGE UP (ref 6–8.3)
RBC # BLD: 3.36 M/UL — LOW (ref 4.2–5.8)
RBC # FLD: 14.5 % — SIGNIFICANT CHANGE UP (ref 10.3–14.5)
SODIUM SERPL-SCNC: 140 MMOL/L — SIGNIFICANT CHANGE UP (ref 135–145)
WBC # BLD: 14.11 K/UL — HIGH (ref 3.8–10.5)
WBC # FLD AUTO: 14.11 K/UL — HIGH (ref 3.8–10.5)

## 2022-05-25 RX ORDER — FLUTICASONE PROPIONATE 50 MCG
1 SPRAY, SUSPENSION NASAL
Qty: 0 | Refills: 0 | DISCHARGE

## 2022-05-25 RX ORDER — ALBUTEROL 90 UG/1
2 AEROSOL, METERED ORAL
Qty: 0 | Refills: 0 | DISCHARGE

## 2022-05-25 RX ORDER — CIPROFLOXACIN LACTATE 400MG/40ML
250 VIAL (ML) INTRAVENOUS EVERY 12 HOURS
Refills: 0 | Status: DISCONTINUED | OUTPATIENT
Start: 2022-05-25 | End: 2022-05-27

## 2022-05-25 RX ORDER — SODIUM CHLORIDE 9 MG/ML
1000 INJECTION INTRAMUSCULAR; INTRAVENOUS; SUBCUTANEOUS ONCE
Refills: 0 | Status: COMPLETED | OUTPATIENT
Start: 2022-05-25 | End: 2022-05-25

## 2022-05-25 RX ADMIN — PANTOPRAZOLE SODIUM 40 MILLIGRAM(S): 20 TABLET, DELAYED RELEASE ORAL at 13:22

## 2022-05-25 RX ADMIN — FINASTERIDE 5 MILLIGRAM(S): 5 TABLET, FILM COATED ORAL at 13:22

## 2022-05-25 RX ADMIN — SODIUM CHLORIDE 1000 MILLILITER(S): 9 INJECTION INTRAMUSCULAR; INTRAVENOUS; SUBCUTANEOUS at 08:00

## 2022-05-25 RX ADMIN — Medication 250 MILLIGRAM(S): at 18:40

## 2022-05-25 RX ADMIN — CEFTRIAXONE 100 MILLIGRAM(S): 500 INJECTION, POWDER, FOR SOLUTION INTRAMUSCULAR; INTRAVENOUS at 01:22

## 2022-05-25 NOTE — PROGRESS NOTE ADULT - PROBLEM SELECTOR PLAN 1
continue cbi  urine now blood tinged from gross hematuria  hg stable above 9  no transfusion needed ,for now  seen by hem urology and id  off abxs,,no infection  we discussed compliance and need to see urology and nephrology as out patient  we discussed complete renal failure requiring dialysis ,at some point in his life time ata if he contonues with poor fu continue cbi  urine now blood tinged from gross hematuria  hg stable above 9  no transfusion needed ,for now  seen by hem urology and id  off abxs,,no infection  we discussed compliance and need to see urology and nephrology as out patient  we discussed complete renal failure requiring dialysis ,at some point in his life time ata if he continues with poor fu

## 2022-05-25 NOTE — PROGRESS NOTE ADULT - SUBJECTIVE AND OBJECTIVE BOX
FLOYD GONSALVES is a 67yMale , patient examined and chart reviewed.    INTERVAL HPI/ OVERNIGHT EVENTS:   Remains in CBI. Less hematuria- pinkish urine.    PAST MEDICAL & SURGICAL HISTORY:  Gout  Enlarged prostate        For details regarding the patient's social history, family history, and other miscellaneous elements, please refer the initial infectious diseases consultation and/or the admitting history and physical examination for this admission.      ROS:  CONSTITUTIONAL:  Negative fever or chills  EYES:  Negative  blurry vision or double vision  CARDIOVASCULAR:  Negative for chest pain or palpitations  RESPIRATORY:  Negative for cough, wheezing, or SOB   GASTROINTESTINAL:  Negative for nausea, vomiting, diarrhea, constipation, or abdominal pain  GENITOURINARY:  Negative frequency, urgency or dysuria + hematuria  NEUROLOGIC:  No headache, confusion, dizziness, lightheadedness  All other systems were reviewed and are negative         Current inpatient medications :    ANTIBIOTICS/RELEVANT:      acetaminophen     Tablet .. 650 milliGRAM(s) Oral every 6 hours PRN  finasteride 5 milliGRAM(s) Oral daily  pantoprazole  Injectable 40 milliGRAM(s) IV Push daily  sodium chloride 0.9%. 1000 milliLiter(s) IV Continuous <Continuous>      Objective:     @ 07:  -   @ 07:00  --------------------------------------------------------  IN: 8425 mL / OUT: 7800 mL / NET: 625 mL     @ 07:  -   @ 16:48  --------------------------------------------------------  IN: 0 mL / OUT: 4000 mL / NET: -4000 mL      T(C): 37.1 (22 @ 13:29), Max: 37.6 (22 @ 22:57)  HR: 69 (22 @ 13:29) (67 - 88)  BP: 101/63 (22 @ 13:29) (93/60 - 137/85)  RR: 18 (22 @ 13:29) (16 - 18)  SpO2: 96% (22 @ 13:29) (84% - 99%)    Physical Exam:  General:  no acute distress  Neck: supple, trachea midline  Lungs: clear, no wheeze/rhonchi  Cardiovascular: regular rate and rhythm, S1 S2  Abdomen: soft, nontender,  bowel sounds normal  : CBI pinkish urine  Neurological: alert and oriented x3  Skin: no rash  Extremities: no edema      LABS:                          9.5    14.11 )-----------( 305      ( 25 May 2022 08:26 )             30.1           140  |  109<H>  |  35<H>  ----------------------------<  94  4.2   |  22  |  2.60<H>    Ca    8.9      25 May 2022 08:26    TPro  6.6  /  Alb  2.7<L>  /  TBili  1.3<H>  /  DBili  x   /  AST  8<L>  /  ALT  14  /  AlkPhos  83        PT/INR - ( 24 May 2022 13:54 )   PT: 12.9 sec;   INR: 1.10 ratio         PTT - ( 24 May 2022 13:54 )  PTT:30.3 sec  Urinalysis Basic - ( 23 May 2022 17:07 )    Color: Red / Appearance: bloody / S.005 / pH: x  Gluc: x / Ketone: Negative  / Bili: Negative / Urobili: Negative   Blood: x / Protein: 500 mg/dL / Nitrite: Negative   Leuk Esterase: Negative / RBC: >50 /HPF / WBC x   Sq Epi: x / Non Sq Epi: Occasional / Bacteria: Occasional      MICROBIOLOGY:    Culture - Urine (collected 23 May 2022 22:29)  Source: Clean Catch Clean Catch (Midstream)  Preliminary Report (25 May 2022 14:26):    10,000 - 49,000 CFU/mL Enterobacter cloacae complex      RADIOLOGY & ADDITIONAL STUDIES:  ACC: [id number] EXAM:  CT RENAL STONE [place]UNT                          PROCEDURE DATE:  2022          INTERPRETATION:  CLINICAL INFORMATION: 67 years  Male with hematuria    Hematuria, unknown cause  hematuria.    COMPARISON: 2022    CONTRAST/COMPLICATIONS:  IV Contrast: NONE  0 cc administered   0 cc discarded  Oral Contrast: NONE  Complications: None reported at time of study completion    PROCEDURE:  CT of the Abdomen and Pelvis was performed.  Sagittal and coronal reformats were performed.    FINDINGS:  LOWER CHEST: Small sliding hiatus hernia with thick-walled distal   esophagus..    LIVER: Within normal limits.  BILE DUCTS: Normal caliber.  GALLBLADDER: Within normal limits.  SPLEEN: Within normal limits.  PANCREAS: Within normal limits.  ADRENALS: Within normal limits.  KIDNEYS/URETERS: Atrophic kidneys with bilateral cysts and hypodensities   reidentified. Left hydroureteronephrosis slightly increased from prior.   Trace right hydronephrosis unchanged. BLADDER: Swelling high density in   the bladder consistent with blood. Castillo catheter in situ. Intraluminal   air introduced via catheter.  REPRODUCTIVE ORGANS: Markedly enlarged prostate 6.6 x 6.9 cm.    BOWEL: No bowel obstruction. Appendix is surgically absent. Stable1.6 x   1.2 cm residual phlegmon in the right lower quadrant (2:83). Sigmoid   diverticulosis without diverticulitis.  PERITONEUM: No ascites.  VESSELS: Within normal limits.  RETROPERITONEUM/LYMPH NODES: Stable shotty gastrohepatic ligament, janet   hepatis and portacaval lymph nodes reidentified. Shotty right lower   quadrant mesenteric lymph nodes and para-aortic nodes.  ABDOMINAL WALL: Bilateral fat-containing inguinal hernias.  BONES: Degenerative changes.    IMPRESSION:  Intraluminal hemorrhage in the bladder. Castillo catheter in situ. Markedly   enlarged prostate reidentified.    Left hydroureteronephrosis slightly increased from prior. Bilateral renal   atrophy. Stable bilateral renal cysts and hypodensities.    Stable shotty intra-abdominal and retroperitoneal lymph nodes as above.    Status post appendectomy with stable small residual phlegmon.      Assessment :   66YO M PMH BPH self caths, seen in ED yesterday after he self catheterized w[place]o use of [place]ant [place], with ensuing gross hematuria.  Pt presented to ER and castillo was placed.  Pt returned today to ER with clot retention.  Castillo was switched to a 3 way catheter in the ER, and CBI was started. UA with hematuria. There was a concern for possible UTI. CT AP with Left hydroureteronephrosis slightly increased from prior.   Ucx with Enterobacter pathogen vs colonization  Due to trauma with castillo will treat short course    Plan :   Cipro po x 5 days  CBI per urology  Urology eval noted  Trend temps and cbc  Pulm toileting  Increase activity      Continue with present regiment.  Appropriate use of antibiotics and adverse effects reviewed.      I have discussed the above plan of care with patient in detail.  I have asked if they have any questions or concerns and appropriately addressed them to the best of my ability .    > 35 minutes were spent in direct patient care reviewing notes, medications ,labs data/ imaging , discussion with multidisciplinary team.    Thank you for allowing me to participate in care of your patient .    Catherine Navarrete MD  Infectious Disease  [phone]

## 2022-05-25 NOTE — PROGRESS NOTE ADULT - SUBJECTIVE AND OBJECTIVE BOX
INTERVAL Hx:  20 Fr CBI has been clogging off intermittently, requiring manual irrigation.  Shift nurse states that she irrigated out a lot of clots earlier today.  CBI currently clear, colorless.  ID note appreciated.    MEDICATIONS  (STANDING):  ciprofloxacin     Tablet 250 milliGRAM(s) Oral every 12 hours  finasteride 5 milliGRAM(s) Oral daily  pantoprazole  Injectable 40 milliGRAM(s) IV Push daily  sodium chloride 0.9%. 1000 milliLiter(s) (75 mL/Hr) IV Continuous <Continuous>    MEDICATIONS  (PRN):  acetaminophen     Tablet .. 650 milliGRAM(s) Oral every 6 hours PRN Temp greater or equal to 38C (100.4F), Mild Pain (1 - 3)        Vital Signs Last 24 Hrs  T(C): 37.1 (25 May 2022 13:29), Max: 37.6 (24 May 2022 22:57)  T(F): 98.8 (25 May 2022 13:29), Max: 99.7 (24 May 2022 22:57)  HR: 69 (25 May 2022 13:29) (67 - 88)  BP: 101/63 (25 May 2022 13:29) (93/60 - 137/85)  BP(mean): --  RR: 18 (25 May 2022 13:29) (16 - 18)  SpO2: 96% (25 May 2022 13:29) (84% - 99%)    PHYSICAL EXAM:    ABDOMEN: soft, NT    Gavin: 20 Fr 3 way.  clear, colorless effluent at slow irrigation rate    LABS:                        9.5    14.11 )-----------( 305      ( 25 May 2022 08:26 )             30.1     05-25    140  |  109<H>  |  35<H>  ----------------------------<  94  4.2   |  22  |  2.60<H>    Ca    8.9      25 May 2022 08:26    TPro  6.6  /  Alb  2.7<L>  /  TBili  1.3<H>  /  DBili  x   /  AST  8<L>  /  ALT  14  /  AlkPhos  83  05-25    Urine culture:  05-23 @ 22:29 --   10,000 - 49,000 CFU/mL Enterobacter cloacae complex      Review of Systems:    CONSTITUTIONAL: No weakness, fevers or chills    SKIN: No itching, burning, rashes, or lesions     EYES/ENT: No visual changes;  No vertigo or throat pain     NECK: No pain or stiffness    RESPIRATORY: No cough, wheezing, hemoptysis; No shortness of breath    CARDIOVASCULAR: No chest pain or palpitations    GASTROINTESTINAL: No abdominal or epigastric pain. No nausea, vomiting, diarrhea    NEUROLOGICAL: No numbness or weakness

## 2022-05-25 NOTE — PROGRESS NOTE ADULT - SUBJECTIVE AND OBJECTIVE BOX
Patient is a 67y Male whom presented to the hospital with ckd and aaron     PAST MEDICAL & SURGICAL HISTORY:  Gout      Enlarged prostate      No significant past surgical history          MEDICATIONS  (STANDING):  finasteride 5 milliGRAM(s) Oral daily  pantoprazole  Injectable 40 milliGRAM(s) IV Push daily  sodium chloride 0.9%. 1000 milliLiter(s) (75 mL/Hr) IV Continuous <Continuous>      Allergies    No Known Allergies    Intolerances        SOCIAL HISTORY:  Denies ETOh,Smoking,     FAMILY HISTORY:      REVIEW OF SYSTEMS:    CONSTITUTIONAL: No weakness, fevers or chills  RESPIRATORY: No cough, wheezing, hemoptysis; No shortness of breath  CARDIOVASCULAR: No chest pain or palpitations  GASTROINTESTINAL: No abdominal or epigastric pain. No nausea, vomiting,     No diarrhea or constipation. No melena   GENITOURINARY: No dysuria, frequency or hematuria  SKIN: dry                            9.5    14.11 )-----------( 305      ( 25 May 2022 08:26 )             30.1       CBC Full  -  ( 25 May 2022 08:26 )  WBC Count : 14.11 K/uL  RBC Count : 3.36 M/uL  Hemoglobin : 9.5 g/dL  Hematocrit : 30.1 %  Platelet Count - Automated : 305 K/uL  Mean Cell Volume : 89.6 fl  Mean Cell Hemoglobin : 28.3 pg  Mean Cell Hemoglobin Concentration : 31.6 gm/dL  Auto Neutrophil # : 10.47 K/uL  Auto Lymphocyte # : 1.99 K/uL  Auto Monocyte # : 1.45 K/uL  Auto Eosinophil # : 0.09 K/uL  Auto Basophil # : 0.04 K/uL  Auto Neutrophil % : 74.2 %  Auto Lymphocyte % : 14.1 %  Auto Monocyte % : 10.3 %  Auto Eosinophil % : 0.6 %  Auto Basophil % : 0.3 %      05-25    140  |  109<H>  |  35<H>  ----------------------------<  94  4.2   |  22  |  2.60<H>    Ca    8.9      25 May 2022 08:26    TPro  6.6  /  Alb  2.7<L>  /  TBili  1.3<H>  /  DBili  x   /  AST  8<L>  /  ALT  14  /  AlkPhos  83  05-25      CAPILLARY BLOOD GLUCOSE          Vital Signs Last 24 Hrs  T(C): 37.1 (25 May 2022 13:29), Max: 37.6 (24 May 2022 22:57)  T(F): 98.8 (25 May 2022 13:29), Max: 99.7 (24 May 2022 22:57)  HR: 69 (25 May 2022 13:29) (67 - 84)  BP: 101/63 (25 May 2022 13:29) (93/60 - 137/85)  BP(mean): --  RR: 18 (25 May 2022 13:29) (16 - 18)  SpO2: 96% (25 May 2022 13:29) (84% - 99%)        PT/INR - ( 24 May 2022 13:54 )   PT: 12.9 sec;   INR: 1.10 ratio         PTT - ( 24 May 2022 13:54 )  PTT:30.3 sec      PHYSICAL EXAM:    Constitutional: NAD  HEENT: conjunctive   clear   Neck:  No JVD  Respiratory: CTAB  Cardiovascular: S1 and S2  Gastrointestinal: BS+, soft, NT/ND  Extremities: No peripheral edema

## 2022-05-25 NOTE — CHART NOTE - NSCHARTNOTEFT_GEN_A_CORE
Called by RN for pt with BP 93/60. Per chart, SBP 130s earlier last night. Pt seen at bedside. Denies any chest pain, dizziness, or light-headedness. Per RN, pt has been passing multiple large clots via the castillo throughout the night. Yesterday repeat Cbc showed H/H 11.7, stable.     - Soft BPs likely 2/2 hematuria  - Ordered 1L ns bolus x 1  - F/u Am CBC  - D/w Dr. Candido Nguyne who agrees with plan

## 2022-05-25 NOTE — PROGRESS NOTE ADULT - SUBJECTIVE AND OBJECTIVE BOX
PAST MEDICAL & SURGICAL HISTORY:  Gout      Enlarged prostate      No significant past surgical history          MEDICATIONS  (STANDING):  finasteride 5 milliGRAM(s) Oral daily  pantoprazole  Injectable 40 milliGRAM(s) IV Push daily  sodium chloride 0.9%. 1000 milliLiter(s) (75 mL/Hr) IV Continuous <Continuous>    MEDICATIONS  (PRN):  acetaminophen     Tablet .. 650 milliGRAM(s) Oral every 6 hours PRN Temp greater or equal to 38C (100.4F), Mild Pain (1 - 3)      Patient is a 67y old  Male who presents with a chief complaint of self catheterization urine while in park,,began to bleed (24 May 2022 19:40)      Vital Signs Last 24 Hrs  T(C): 37.1 (25 May 2022 13:29), Max: 37.6 (24 May 2022 22:57)  T(F): 98.8 (25 May 2022 13:29), Max: 99.7 (24 May 2022 22:57)  HR: 69 (25 May 2022 13:29) (67 - 88)  BP: 101/63 (25 May 2022 13:29) (93/60 - 137/85)  BP(mean): --  RR: 18 (25 May 2022 13:29) (16 - 18)  SpO2: 96% (25 May 2022 13:29) (84% - 99%)           @ 07:  -   @ 07:00  --------------------------------------------------------  IN: 8425 mL / OUT: 7800 mL / NET: 625 mL     @ 07:01  -   @ 15:39  --------------------------------------------------------  IN: 0 mL / OUT: 2000 mL / NET: -2000 mL        REVIEW OF SYSTEMS:    Constitutional: No fever, weight loss or fatigue  Eyes: No eye pain, visual disturbances, or discharge  ENT:  No difficulty hearing, tinnitus, vertigo; No sinus or throat pain  Neck: No pain or stiffness  Breasts: No pain, masses or nipple discharge  Respiratory: No cough, wheezing, chills or hemoptysis  Cardiovascular: No chest pain, palpitations, shortness of breath, dizziness or leg swelling  Gastrointestinal: No abdominal or epigastric pain. No nausea, vomiting or hematemesis; No diarrhea or constipation. No melena or hematochezia.  Genitourinary: No dysuria, frequency, hematuria or incontinence  Rectal: No pain, hemorrhoids or incontinence  Neurological: No headaches, memory loss, loss of strength, numbness or tremors  Skin: No itching, burning, rashes or lesions   Lymph Nodes: No enlarged glands  Endocrine: No heat or cold intolerance; No hair loss  Musculoskeletal: No joint pain or swelling; No muscle, back or extremity pain  Psychiatric: No depression, anxiety, mood swings or difficulty sleeping  Heme/Lymph: No easy bruising or bleeding gums  Allergy and Immunologic: No hives or eczema    PHYSICAL EXAM:    Constitutional: NAD, well-groomed, well-developed  HEENT: PERRLA, EOMI, Normal Hearing, MMM  Neck: No LAD, No JVD  Back: Normal spine flexure, No CVA tenderness  Respiratory: CTAB/L   Cardiovascular: S1 and S2, RRR, no M/G/R  Gastrointestinal: BS+, soft, NT/ND  Extremities: No peripheral edema  Vascular: 2+ peripheral pulses  Neurological: A/O x 3, no focal deficits  Skin: No rashes      decubiti:                           9.5    14.11 )-----------( 305      ( 25 May 2022 08:26 )             30.1         140  |  109<H>  |  35<H>  ----------------------------<  94  4.2   |  22  |  2.60<H>    Ca    8.9      25 May 2022 08:26    TPro  6.6  /  Alb  2.7<L>  /  TBili  1.3<H>  /  DBili  x   /  AST  8<L>  /  ALT  14  /  AlkPhos  83      PT/INR - ( 24 May 2022 13:54 )   PT: 12.9 sec;   INR: 1.10 ratio         PTT - ( 24 May 2022 13:54 )  PTT:30.3 sec  Urinalysis Basic - ( 23 May 2022 17:07 )    Color: Red / Appearance: bloody / S.005 / pH: x  Gluc: x / Ketone: Negative  / Bili: Negative / Urobili: Negative   Blood: x / Protein: 500 mg/dL / Nitrite: Negative   Leuk Esterase: Negative / RBC: >50 /HPF / WBC x   Sq Epi: x / Non Sq Epi: Occasional / Bacteria: Occasional            Clean Catch Clean Catch (Midstream)   @ 22:29   10,000 - 49,000 CFU/mL Enterobacter cloacae complex  --  --        Urine Culture:   @ 22:29    --        10,000 - 49,000 CFU/mL Enterobacter cloacae complex        Radiology:     PAST MEDICAL & SURGICAL HISTORY:  Gout      Enlarged prostate      No significant past surgical history          MEDICATIONS  (STANDING):  finasteride 5 milliGRAM(s) Oral daily  pantoprazole  Injectable 40 milliGRAM(s) IV Push daily  sodium chloride 0.9%. 1000 milliLiter(s) (75 mL/Hr) IV Continuous <Continuous>    MEDICATIONS  (PRN):  acetaminophen     Tablet .. 650 milliGRAM(s) Oral every 6 hours PRN Temp greater or equal to 38C (100.4F), Mild Pain (1 - 3)      Patient is a 67y old  Male who presents with a chief complaint of self catheterization urine while in park,,began to bleed (24 May 2022 19:40)      Vital Signs Last 24 Hrs  T(C): 37.1 (25 May 2022 13:29), Max: 37.6 (24 May 2022 22:57)  T(F): 98.8 (25 May 2022 13:29), Max: 99.7 (24 May 2022 22:57)  HR: 69 (25 May 2022 13:29) (67 - 88)  BP: 101/63 (25 May 2022 13:29) (93/60 - 137/85)  BP(mean): --  RR: 18 (25 May 2022 13:29) (16 - 18)  SpO2: 96% (25 May 2022 13:29) (84% - 99%)           @ 07:  -   @ 07:00  --------------------------------------------------------  IN: 8425 mL / OUT: 7800 mL / NET: 625 mL     @ 07:01  -   @ 15:39  --------------------------------------------------------  IN: 0 mL / OUT: 2000 mL / NET: -2000 mL        REVIEW OF SYSTEMS:    Constitutional: No fever, weight loss or fatigue  Eyes: No eye pain, visual disturbances, or discharge  ENT:  No difficulty hearing, tinnitus, vertigo; No sinus or throat pain  Neck: No pain or stiffness  Breasts: No pain, masses or nipple discharge  Respiratory: No cough, wheezing, chills or hemoptysis  Cardiovascular: No chest pain, palpitations, shortness of breath, dizziness or leg swelling  Gastrointestinal: No abdominal or epigastric pain. No nausea, vomiting or hematemesis; No diarrhea or constipation. No melena or hematochezia.  Genitourinary: No dysuria, frequency, hematuria or incontinence  Rectal: No pain, hemorrhoids or incontinence  Neurological: No headaches, memory loss, loss of strength, numbness or tremors  Skin: No itching, burning, rashes or lesions   Lymph Nodes: No enlarged glands  Endocrine: No heat or cold intolerance; No hair loss  Musculoskeletal: No joint pain or swelling; No muscle, back or extremity pain  Psychiatric: No depression, anxiety, mood swings or difficulty sleeping  Heme/Lymph: No easy bruising or bleeding gums  Allergy and Immunologic: No hives or eczema    PHYSICAL EXAM:  cbi urine blood tinged  no longer gross red blood  just ate dinner,whole tray  Constitutional:   Respiratory: CTAB/L   Cardiovascular: S1 and S2, RRR, no M/G/R  Gastrointestinal: BS+, soft, NT/ND  Extremities: No peripheral edema  Neurological: A/O x 3, no focal deficits  Skin: No rashes      decubiti: no                          9.5    14.11 )-----------( 305      ( 25 May 2022 08:26 )             30.1         140  |  109<H>  |  35<H>  ----------------------------<  94  4.2   |  22  |  2.60<H>    Ca    8.9      25 May 2022 08:26    TPro  6.6  /  Alb  2.7<L>  /  TBili  1.3<H>  /  DBili  x   /  AST  8<L>  /  ALT  14  /  AlkPhos  83      PT/INR - ( 24 May 2022 13:54 )   PT: 12.9 sec;   INR: 1.10 ratio         PTT - ( 24 May 2022 13:54 )  PTT:30.3 sec  Urinalysis Basic - ( 23 May 2022 17:07 )    Color: Red / Appearance: bloody / S.005 / pH: x  Gluc: x / Ketone: Negative  / Bili: Negative / Urobili: Negative   Blood: x / Protein: 500 mg/dL / Nitrite: Negative   Leuk Esterase: Negative / RBC: >50 /HPF / WBC x   Sq Epi: x / Non Sq Epi: Occasional / Bacteria: Occasional            Clean Catch Clean Catch (Midstream)   @ 22:29   10,000 - 49,000 CFU/mL Enterobacter cloacae complex  --  --        Urine Culture:   @ 22:29    --        10,000 - 49,000 CFU/mL Enterobacter cloacae complex        Radiology:

## 2022-05-26 ENCOUNTER — TRANSCRIPTION ENCOUNTER (OUTPATIENT)
Age: 67
End: 2022-05-26

## 2022-05-26 LAB
-  AMIKACIN: SIGNIFICANT CHANGE UP
-  AMOXICILLIN/CLAVULANIC ACID: SIGNIFICANT CHANGE UP
-  AMPICILLIN/SULBACTAM: SIGNIFICANT CHANGE UP
-  AMPICILLIN: SIGNIFICANT CHANGE UP
-  AZTREONAM: SIGNIFICANT CHANGE UP
-  CEFAZOLIN: SIGNIFICANT CHANGE UP
-  CEFEPIME: SIGNIFICANT CHANGE UP
-  CEFOXITIN: SIGNIFICANT CHANGE UP
-  CEFTRIAXONE: SIGNIFICANT CHANGE UP
-  CIPROFLOXACIN: SIGNIFICANT CHANGE UP
-  ERTAPENEM: SIGNIFICANT CHANGE UP
-  GENTAMICIN: SIGNIFICANT CHANGE UP
-  IMIPENEM: SIGNIFICANT CHANGE UP
-  LEVOFLOXACIN: SIGNIFICANT CHANGE UP
-  MEROPENEM: SIGNIFICANT CHANGE UP
-  NITROFURANTOIN: SIGNIFICANT CHANGE UP
-  PIPERACILLIN/TAZOBACTAM: SIGNIFICANT CHANGE UP
-  TIGECYCLINE: SIGNIFICANT CHANGE UP
-  TOBRAMYCIN: SIGNIFICANT CHANGE UP
-  TRIMETHOPRIM/SULFAMETHOXAZOLE: SIGNIFICANT CHANGE UP
ALBUMIN SERPL ELPH-MCNC: 2.7 G/DL — LOW (ref 3.3–5)
ALP SERPL-CCNC: 71 U/L — SIGNIFICANT CHANGE UP (ref 40–120)
ALT FLD-CCNC: 15 U/L — SIGNIFICANT CHANGE UP (ref 12–78)
ANION GAP SERPL CALC-SCNC: 5 MMOL/L — SIGNIFICANT CHANGE UP (ref 5–17)
AST SERPL-CCNC: 10 U/L — LOW (ref 15–37)
BASOPHILS # BLD AUTO: 0.04 K/UL — SIGNIFICANT CHANGE UP (ref 0–0.2)
BASOPHILS NFR BLD AUTO: 0.3 % — SIGNIFICANT CHANGE UP (ref 0–2)
BILIRUB SERPL-MCNC: 0.7 MG/DL — SIGNIFICANT CHANGE UP (ref 0.2–1.2)
BUN SERPL-MCNC: 42 MG/DL — HIGH (ref 7–23)
CALCIUM SERPL-MCNC: 9.6 MG/DL — SIGNIFICANT CHANGE UP (ref 8.5–10.1)
CHLORIDE SERPL-SCNC: 111 MMOL/L — HIGH (ref 96–108)
CO2 SERPL-SCNC: 23 MMOL/L — SIGNIFICANT CHANGE UP (ref 22–31)
CREAT SERPL-MCNC: 2.7 MG/DL — HIGH (ref 0.5–1.3)
CULTURE RESULTS: SIGNIFICANT CHANGE UP
EGFR: 25 ML/MIN/1.73M2 — LOW
EOSINOPHIL # BLD AUTO: 0.27 K/UL — SIGNIFICANT CHANGE UP (ref 0–0.5)
EOSINOPHIL NFR BLD AUTO: 2.3 % — SIGNIFICANT CHANGE UP (ref 0–6)
GLUCOSE SERPL-MCNC: 113 MG/DL — HIGH (ref 70–99)
HCT VFR BLD CALC: 27.7 % — LOW (ref 39–50)
HGB BLD-MCNC: 8.7 G/DL — LOW (ref 13–17)
IMM GRANULOCYTES NFR BLD AUTO: 0.9 % — SIGNIFICANT CHANGE UP (ref 0–1.5)
LYMPHOCYTES # BLD AUTO: 1.71 K/UL — SIGNIFICANT CHANGE UP (ref 1–3.3)
LYMPHOCYTES # BLD AUTO: 14.6 % — SIGNIFICANT CHANGE UP (ref 13–44)
MCHC RBC-ENTMCNC: 28.2 PG — SIGNIFICANT CHANGE UP (ref 27–34)
MCHC RBC-ENTMCNC: 31.4 GM/DL — LOW (ref 32–36)
MCV RBC AUTO: 89.6 FL — SIGNIFICANT CHANGE UP (ref 80–100)
METHOD TYPE: SIGNIFICANT CHANGE UP
MONOCYTES # BLD AUTO: 1.2 K/UL — HIGH (ref 0–0.9)
MONOCYTES NFR BLD AUTO: 10.2 % — SIGNIFICANT CHANGE UP (ref 2–14)
NEUTROPHILS # BLD AUTO: 8.4 K/UL — HIGH (ref 1.8–7.4)
NEUTROPHILS NFR BLD AUTO: 71.7 % — SIGNIFICANT CHANGE UP (ref 43–77)
NRBC # BLD: 0 /100 WBCS — SIGNIFICANT CHANGE UP (ref 0–0)
ORGANISM # SPEC MICROSCOPIC CNT: SIGNIFICANT CHANGE UP
ORGANISM # SPEC MICROSCOPIC CNT: SIGNIFICANT CHANGE UP
PLATELET # BLD AUTO: 256 K/UL — SIGNIFICANT CHANGE UP (ref 150–400)
POTASSIUM SERPL-MCNC: 4.5 MMOL/L — SIGNIFICANT CHANGE UP (ref 3.5–5.3)
POTASSIUM SERPL-SCNC: 4.5 MMOL/L — SIGNIFICANT CHANGE UP (ref 3.5–5.3)
PROT SERPL-MCNC: 6.5 G/DL — SIGNIFICANT CHANGE UP (ref 6–8.3)
RBC # BLD: 3.09 M/UL — LOW (ref 4.2–5.8)
RBC # FLD: 14.8 % — HIGH (ref 10.3–14.5)
SODIUM SERPL-SCNC: 139 MMOL/L — SIGNIFICANT CHANGE UP (ref 135–145)
SPECIMEN SOURCE: SIGNIFICANT CHANGE UP
WBC # BLD: 11.72 K/UL — HIGH (ref 3.8–10.5)
WBC # FLD AUTO: 11.72 K/UL — HIGH (ref 3.8–10.5)

## 2022-05-26 RX ORDER — FINASTERIDE 5 MG/1
1 TABLET, FILM COATED ORAL
Qty: 0 | Refills: 0 | DISCHARGE
Start: 2022-05-26

## 2022-05-26 RX ORDER — ERYTHROPOIETIN 10000 [IU]/ML
10000 INJECTION, SOLUTION INTRAVENOUS; SUBCUTANEOUS
Refills: 0 | Status: DISCONTINUED | OUTPATIENT
Start: 2022-05-26 | End: 2022-05-27

## 2022-05-26 RX ORDER — CIPROFLOXACIN LACTATE 400MG/40ML
1 VIAL (ML) INTRAVENOUS
Qty: 0 | Refills: 0 | DISCHARGE
Start: 2022-05-26

## 2022-05-26 RX ORDER — SODIUM CHLORIDE 9 MG/ML
1000 INJECTION INTRAMUSCULAR; INTRAVENOUS; SUBCUTANEOUS
Refills: 0 | Status: DISCONTINUED | OUTPATIENT
Start: 2022-05-26 | End: 2022-05-27

## 2022-05-26 RX ADMIN — Medication 250 MILLIGRAM(S): at 05:24

## 2022-05-26 RX ADMIN — Medication 250 MILLIGRAM(S): at 18:30

## 2022-05-26 RX ADMIN — FINASTERIDE 5 MILLIGRAM(S): 5 TABLET, FILM COATED ORAL at 15:51

## 2022-05-26 RX ADMIN — ERYTHROPOIETIN 10000 UNIT(S): 10000 INJECTION, SOLUTION INTRAVENOUS; SUBCUTANEOUS at 22:16

## 2022-05-26 RX ADMIN — SODIUM CHLORIDE 100 MILLILITER(S): 9 INJECTION INTRAMUSCULAR; INTRAVENOUS; SUBCUTANEOUS at 18:32

## 2022-05-26 RX ADMIN — PANTOPRAZOLE SODIUM 40 MILLIGRAM(S): 20 TABLET, DELAYED RELEASE ORAL at 15:50

## 2022-05-26 NOTE — PROGRESS NOTE ADULT - SUBJECTIVE AND OBJECTIVE BOX
Patient is a 67y Male whom presented to the hospital with ckd and aaron     PAST MEDICAL & SURGICAL HISTORY:  Gout      Enlarged prostate      No significant past surgical history          MEDICATIONS  (STANDING):  finasteride 5 milliGRAM(s) Oral daily  pantoprazole  Injectable 40 milliGRAM(s) IV Push daily  sodium chloride 0.9%. 1000 milliLiter(s) (75 mL/Hr) IV Continuous <Continuous>      Allergies    No Known Allergies    Intolerances        SOCIAL HISTORY:  Denies ETOh,Smoking,     FAMILY HISTORY:      REVIEW OF SYSTEMS:    CONSTITUTIONAL: No weakness, fevers or chills  RESPIRATORY: No cough, wheezing, hemoptysis; No shortness of breath  CARDIOVASCULAR: No chest pain or palpitations  GASTROINTESTINAL: No abdominal or epigastric pain. No nausea, vomiting,     No diarrhea or constipation. No melena                             8.7    11.72 )-----------( 256      ( 26 May 2022 10:07 )             27.7       CBC Full  -  ( 26 May 2022 10:07 )  WBC Count : 11.72 K/uL  RBC Count : 3.09 M/uL  Hemoglobin : 8.7 g/dL  Hematocrit : 27.7 %  Platelet Count - Automated : 256 K/uL  Mean Cell Volume : 89.6 fl  Mean Cell Hemoglobin : 28.2 pg  Mean Cell Hemoglobin Concentration : 31.4 gm/dL  Auto Neutrophil # : 8.40 K/uL  Auto Lymphocyte # : 1.71 K/uL  Auto Monocyte # : 1.20 K/uL  Auto Eosinophil # : 0.27 K/uL  Auto Basophil # : 0.04 K/uL  Auto Neutrophil % : 71.7 %  Auto Lymphocyte % : 14.6 %  Auto Monocyte % : 10.2 %  Auto Eosinophil % : 2.3 %  Auto Basophil % : 0.3 %      05-26    139  |  111<H>  |  42<H>  ----------------------------<  113<H>  4.5   |  23  |  2.70<H>    Ca    9.6      26 May 2022 10:07    TPro  6.5  /  Alb  2.7<L>  /  TBili  0.7  /  DBili  x   /  AST  10<L>  /  ALT  15  /  AlkPhos  71  05-26      CAPILLARY BLOOD GLUCOSE          Vital Signs Last 24 Hrs  T(C): 36.9 (26 May 2022 20:43), Max: 36.9 (26 May 2022 20:43)  T(F): 98.4 (26 May 2022 20:43), Max: 98.4 (26 May 2022 20:43)  HR: 75 (26 May 2022 20:43) (64 - 75)  BP: 103/62 (26 May 2022 20:43) (98/61 - 103/62)  BP(mean): --  RR: 17 (26 May 2022 20:43) (17 - 18)  SpO2: 93% (26 May 2022 20:43) (93% - 96%)                  PHYSICAL EXAM:    Constitutional: NAD  HEENT: conjunctive   clear   Neck:  No JVD  Respiratory: CTAB  Cardiovascular: S1 and S2  Gastrointestinal: BS+, soft, NT/ND  Extremities: No peripheral edema

## 2022-05-26 NOTE — DISCHARGE NOTE PROVIDER - NSDCFUSCHEDAPPT_GEN_ALL_CORE_FT
Mj Arellano Physician [facility] [address]  Scheduled Appointment: 06/17/2022

## 2022-05-26 NOTE — PROGRESS NOTE ADULT - SUBJECTIVE AND OBJECTIVE BOX
PAST MEDICAL & SURGICAL HISTORY:  Gout      Enlarged prostate      No significant past surgical history          MEDICATIONS  (STANDING):  ciprofloxacin     Tablet 250 milliGRAM(s) Oral every 12 hours  epoetin zheng-epbx (RETACRIT) Injectable 75656 Unit(s) SubCutaneous <User Schedule>  finasteride 5 milliGRAM(s) Oral daily  pantoprazole  Injectable 40 milliGRAM(s) IV Push daily  sodium chloride 0.9%. 1000 milliLiter(s) (100 mL/Hr) IV Continuous <Continuous>    MEDICATIONS  (PRN):  acetaminophen     Tablet .. 650 milliGRAM(s) Oral every 6 hours PRN Temp greater or equal to 38C (100.4F), Mild Pain (1 - 3)      Patient is a 67y old  Male who presents with a chief complaint of self catheterization urine while in park without lubricant,,,began to bleed profusely (26 May 2022 14:37)      Vital Signs Last 24 Hrs  T(C): 36.3 (26 May 2022 13:58), Max: 36.3 (26 May 2022 05:14)  T(F): 97.4 (26 May 2022 13:58), Max: 97.4 (26 May 2022 05:14)  HR: 66 (26 May 2022 13:58) (64 - 66)  BP: 98/62 (26 May 2022 13:58) (98/61 - 98/62)  BP(mean): --  RR: 18 (26 May 2022 13:58) (18 - 18)  SpO2: 95% (26 May 2022 13:58) (95% - 96%)          05-25 @ 07:01  -  05-26 @ 07:00  --------------------------------------------------------  IN: 0 mL / OUT: 70568 mL / NET: -86049 mL    05-26 @ 07:01  -  05-26 @ 20:27  --------------------------------------------------------  IN: 6800 mL / OUT: 8000 mL / NET: -1200 mL        REVIEW OF SYSTEMS:    Constitutional: No fever, weight loss or fatigue  Eyes: No eye pain, visual disturbances, or discharge  ENT:  No difficulty hearing, tinnitus, vertigo; No sinus or throat pain  Neck: No pain or stiffness  Breasts: No pain, masses or nipple discharge  Respiratory: No cough, wheezing, chills or hemoptysis  Cardiovascular: No chest pain, palpitations, shortness of breath, dizziness or leg swelling  Gastrointestinal: No abdominal or epigastric pain. No nausea, vomiting or hematemesis; No diarrhea or constipation. No melena or hematochezia.  Genitourinary: No dysuria, frequency, hematuria or incontinence  Rectal: No pain, hemorrhoids or incontinence  Neurological: No headaches, memory loss, loss of strength, numbness or tremors  Skin: No itching, burning, rashes or lesions   Lymph Nodes: No enlarged glands  Endocrine: No heat or cold intolerance; No hair loss  Musculoskeletal: No joint pain or swelling; No muscle, back or extremity pain  Psychiatric: No depression, anxiety, mood swings or difficulty sleeping  Heme/Lymph: No easy bruising or bleeding gums  Allergy and Immunologic: No hives or eczema    PHYSICAL EXAM:  cbi in progress  intermittent obstruction with clots still  urology changed willie castillo  Respiratory: CTAB/L   Cardiovascular: S1 and S2, RRR, no M/G/R  Gastrointestinal: BS+, soft, NT/ND  Extremities: No peripheral edema  Vascular: 2+ peripheral pulses  Neurological: A/O x 3, no focal deficits                                  8.7    11.72 )-----------( 256      ( 26 May 2022 10:07 )             27.7     05-26    139  |  111<H>  |  42<H>  ----------------------------<  113<H>  4.5   |  23  |  2.70<H>    Ca    9.6      26 May 2022 10:07    TPro  6.5  /  Alb  2.7<L>  /  TBili  0.7  /  DBili  x   /  AST  10<L>  /  ALT  15  /  AlkPhos  71  05-26              Clean Catch Clean Catch (Midstream)  05-23 @ 22:29   10,000 - 49,000 CFU/mL Enterobacter cloacae complex  --  Enterobacter cloacae complex        Urine Culture:  05-23 @ 22:29    --        10,000 - 49,000 CFU/mL Enterobacter cloacae complex        Radiology:

## 2022-05-26 NOTE — PROGRESS NOTE ADULT - SUBJECTIVE AND OBJECTIVE BOX
INTERVAL Hx:  CBI stopping intermittently, and nursing staff is irrigating manually frequently.  I removed the 20 Fr catheter and placed a 24 Fr hematuria catheter and manually irrigated.  No clots were removed.  Virtually no hematuria evident with irrigation turned off.  I then removed the 24 Fr catheter entirely.    MEDICATIONS  (STANDING):  ciprofloxacin     Tablet 250 milliGRAM(s) Oral every 12 hours  epoetin zheng-epbx (RETACRIT) Injectable 33659 Unit(s) SubCutaneous <User Schedule>  finasteride 5 milliGRAM(s) Oral daily  pantoprazole  Injectable 40 milliGRAM(s) IV Push daily  sodium chloride 0.9%. 1000 milliLiter(s) (100 mL/Hr) IV Continuous <Continuous>    MEDICATIONS  (PRN):  acetaminophen     Tablet .. 650 milliGRAM(s) Oral every 6 hours PRN Temp greater or equal to 38C (100.4F), Mild Pain (1 - 3)        Vital Signs Last 24 Hrs  T(C): 36.9 (26 May 2022 20:43), Max: 36.9 (26 May 2022 20:43)  T(F): 98.4 (26 May 2022 20:43), Max: 98.4 (26 May 2022 20:43)  HR: 75 (26 May 2022 20:43) (64 - 75)  BP: 103/62 (26 May 2022 20:43) (98/61 - 103/62)  BP(mean): --  RR: 17 (26 May 2022 20:43) (17 - 18)  SpO2: 93% (26 May 2022 20:43) (93% - 96%)    PHYSICAL EXAM:    ABDOMEN: soft, NT    Gavin: removed during my visit    LABS:                        8.7    11.72 )-----------( 256      ( 26 May 2022 10:07 )             27.7     05-26    139  |  111<H>  |  42<H>  ----------------------------<  113<H>  4.5   |  23  |  2.70<H>    Ca    9.6      26 May 2022 10:07    TPro  6.5  /  Alb  2.7<L>  /  TBili  0.7  /  DBili  x   /  AST  10<L>  /  ALT  15  /  AlkPhos  71  05-26    Urine culture:  05-23 @ 22:29 --   10,000 - 49,000 CFU/mL Enterobacter cloacae complex

## 2022-05-26 NOTE — DISCHARGE NOTE PROVIDER - CARE PROVIDER_API CALL
Carolyn Mills (DO)  Medicine  [address]  [place] [address]  Phone: [phone]  Fax: [phone]  Follow Up Time: 2 weeks

## 2022-05-26 NOTE — CHART NOTE - NSCHARTNOTEFT_GEN_A_CORE
I spoke to urology  continue cbi due to continued intermittent obstruction from blood clots ,thus causing elevation cr  continue cbi,for now  no other intervention required,per urology  nephrology on the case  restart iv fluids

## 2022-05-26 NOTE — DISCHARGE NOTE PROVIDER - HOSPITAL COURSE
patient came in due to bleeding via urethra due to self catheterizing without lubrication,at the park for over 12 hours  he was placed on cbi by the ED  scans negative obstruction  treated for infection  seen by urology and nephrology  baseline cr about 2.4,,,went up to 2.7 with excellent urine out put  cbi was removed by urology and given large catheter for home and must use lubricant  to finish cipro at home 5 days  we discussed must fu with urology and nephrology  i explained again,,if he continues to take care of himself poorly and not fu he will end up in acute renal failure on dialysis

## 2022-05-26 NOTE — DISCHARGE NOTE PROVIDER - NSDCMRMEDTOKEN_GEN_ALL_CORE_FT
Albuterol (Eqv-ProAir HFA) 90 mcg/inh inhalation aerosol: 2 puff(s) inhaled 4 times a day, As Needed  ciprofloxacin 250 mg oral tablet: 1 tab(s) orally every 12 hours 5 days  finasteride 5 mg oral tablet: 1 tab(s) orally once a day  fluticasone 50 mcg/inh nasal spray: 1 spray(s) nasal 2 times a day, As Needed

## 2022-05-26 NOTE — PROGRESS NOTE ADULT - PROBLEM SELECTOR PLAN 1
due to self cath in the park without lubrication  cr up to 2.7 due to intermittent blockage from clots  castillo was just changed by urologist  few days cipro per id  restarted iv fluids

## 2022-05-26 NOTE — DISCHARGE NOTE PROVIDER - NSDCCPCAREPLAN_GEN_ALL_CORE_FT
PRINCIPAL DISCHARGE DIAGNOSIS  Diagnosis: Hematuria  Assessment and Plan of Treatment: due to not using lubricant with self catherization of urethra  sp cbi  seen by urology and nephrology  cipro 5 more days  finasteride qd  must fu with both specialist  must use lubrication at all times in the future due to long hx bph,,,highly none compliant

## 2022-05-26 NOTE — PROGRESS NOTE ADULT - SUBJECTIVE AND OBJECTIVE BOX
FLOYD GONSALVES is a 67yMale , patient examined and chart reviewed.    INTERVAL HPI/ OVERNIGHT EVENTS:   Remains in CBI. Less hematuria- pinkish urine.  Still with intermittent clots. Afebrile.    PAST MEDICAL & SURGICAL HISTORY:  Gout  Enlarged prostate        For details regarding the patient's social history, family history, and other miscellaneous elements, please refer the initial infectious diseases consultation and/or the admitting history and physical examination for this admission.      ROS:  CONSTITUTIONAL:  Negative fever or chills  EYES:  Negative  blurry vision or double vision  CARDIOVASCULAR:  Negative for chest pain or palpitations  RESPIRATORY:  Negative for cough, wheezing, or SOB   GASTROINTESTINAL:  Negative for nausea, vomiting, diarrhea, constipation, or abdominal pain  GENITOURINARY:  Negative frequency, urgency or dysuria + hematuria  NEUROLOGIC:  No headache, confusion, dizziness, lightheadedness  All other systems were reviewed and are negative         Current inpatient medications :    ANTIBIOTICS/RELEVANT:  ciprofloxacin     Tablet 250 milliGRAM(s) Oral every 12 hours    MEDICATIONS  (STANDING):  finasteride 5 milliGRAM(s) Oral daily  pantoprazole  Injectable 40 milliGRAM(s) IV Push daily    MEDICATIONS  (PRN):  acetaminophen     Tablet .. 650 milliGRAM(s) Oral every 6 hours PRN Temp greater or equal to 38C (100.4F), Mild Pain (1 - 3)      Objective:  Vital Signs Last 24 Hrs  T(C): 36.3 (26 May 2022 13:58), Max: 36.8 (25 May 2022 20:24)  T(F): 97.4 (26 May 2022 13:58), Max: 98.2 (25 May 2022 20:24)  HR: 66 (26 May 2022 13:58) (64 - 74)  BP: 98/62 (26 May 2022 13:58) (98/61 - 103/61)  RR: 18 (26 May 2022 13:58) (17 - 18)  SpO2: 95% (26 May 2022 13:58) (91% - 96%)    Physical Exam:  General:  no acute distress  Neck: supple, trachea midline  Lungs: clear, no wheeze/rhonchi  Cardiovascular: regular rate and rhythm, S1 S2  Abdomen: soft, nontender,  bowel sounds normal  : CBI pinkish urine  Neurological: alert and oriented x3  Skin: no rash  Extremities: no edema      LABS:                        8.7    11.72 )-----------( 256      ( 26 May 2022 10:07 )             27.7       139  |  111<H>  |  42<H>  ----------------------------<  113<H>  4.5   |  23  |  2.70<H>    Ca    9.6      26 May 2022 10:07    TPro  6.5  /  Alb  2.7<L>  /  TBili  0.7  /  DBili  x   /  AST  10<L>  /  ALT  15  /  AlkPhos  71      Urinalysis Basic - ( 23 May 2022 17:07 )    Color: Red / Appearance: bloody / S.005 / pH: x  Gluc: x / Ketone: Negative  / Bili: Negative / Urobili: Negative   Blood: x / Protein: 500 mg/dL / Nitrite: Negative   Leuk Esterase: Negative / RBC: >50 /HPF / WBC x   Sq Epi: x / Non Sq Epi: Occasional / Bacteria: Occasional      MICROBIOLOGY:    Culture - Urine (collected 23 May 2022 22:29)  Source: Clean Catch Clean Catch (Midstream)  Preliminary Report (25 May 2022 14:26):    10,000 - 49,000 CFU/mL Enterobacter cloacae complex      RADIOLOGY & ADDITIONAL STUDIES:  ACC: [id number] EXAM:  CT RENAL STONE HUNT                          PROCEDURE DATE:  2022          INTERPRETATION:  CLINICAL INFORMATION: 67 years  Male with hematuria    Hematuria, unknown cause  hematuria.    COMPARISON: 2022    CONTRAST/COMPLICATIONS:  IV Contrast: NONE  0 cc administered   0 cc discarded  Oral Contrast: NONE  Complications: None reported at time of study completion    PROCEDURE:  CT of the Abdomen and Pelvis was performed.  Sagittal and coronal reformats were performed.    FINDINGS:  LOWER CHEST: Small sliding hiatus hernia with thick-walled distal   esophagus..    LIVER: Within normal limits.  BILE DUCTS: Normal caliber.  GALLBLADDER: Within normal limits.  SPLEEN: Within normal limits.  PANCREAS: Within normal limits.  ADRENALS: Within normal limits.  KIDNEYS/URETERS: Atrophic kidneys with bilateral cysts and hypodensities   reidentified. Left hydroureteronephrosis slightly increased from prior.   Trace right hydronephrosis unchanged. BLADDER: Swelling high density in   the bladder consistent with blood. Castillo catheter in situ. Intraluminal   air introduced via catheter.  REPRODUCTIVE ORGANS: Markedly enlarged prostate 6.6 x 6.9 cm.    BOWEL: No bowel obstruction. Appendix is surgically absent. Stable1.6 x   1.2 cm residual phlegmon in the right lower quadrant (2:83). Sigmoid   diverticulosis without diverticulitis.  PERITONEUM: No ascites.  VESSELS: Within normal limits.  RETROPERITONEUM/LYMPH NODES: Stable shotty gastrohepatic ligament, janet   hepatis and portacaval lymph nodes reidentified. Shotty right lower   quadrant mesenteric lymph nodes and para-aortic nodes.  ABDOMINAL WALL: Bilateral fat-containing inguinal hernias.  BONES: Degenerative changes.    IMPRESSION:  Intraluminal hemorrhage in the bladder. Castillo catheter in situ. Markedly   enlarged prostate reidentified.    Left hydroureteronephrosis slightly increased from prior. Bilateral renal   atrophy. Stable bilateral renal cysts and hypodensities.    Stable shotty intra-abdominal and retroperitoneal lymph nodes as above.    Status post appendectomy with stable small residual phlegmon.      Assessment :   66YO M PMH BPH self caths, admitted with gross hematuria with clot retention. On CBI. UA with hematuria. There was a concern for possible UTI. CT AP with Left hydroureteronephrosis slightly increased from prior.   Ucx with Enterobacter pathogen vs colonization  Due to trauma with castillo will treat short course  QWBC downtrending    Plan :   Cipro po x 5 days  CBI per urology  Urology following  Trend temps and cbc  Pulm toileting  Increase activity  Stable from ID standpoint      Continue with present regiment.  Appropriate use of antibiotics and adverse effects reviewed.      I have discussed the above plan of care with patient in detail.  I have asked if they have any questions or concerns and appropriately addressed them to the best of my ability .    > 35 minutes were spent in direct patient care reviewing notes, medications ,labs data/ imaging , discussion with multidisciplinary team.    Thank you for allowing me to participate in care of your patient .    Catherine Navarrete MD  Infectious Disease  [phone]

## 2022-05-27 ENCOUNTER — TRANSCRIPTION ENCOUNTER (OUTPATIENT)
Age: 67
End: 2022-05-27

## 2022-05-27 VITALS
DIASTOLIC BLOOD PRESSURE: 74 MMHG | TEMPERATURE: 98 F | RESPIRATION RATE: 17 BRPM | SYSTOLIC BLOOD PRESSURE: 110 MMHG | OXYGEN SATURATION: 95 % | HEART RATE: 74 BPM

## 2022-05-27 PROCEDURE — 80053 COMPREHEN METABOLIC PANEL: CPT

## 2022-05-27 PROCEDURE — 86901 BLOOD TYPING SEROLOGIC RH(D): CPT

## 2022-05-27 PROCEDURE — 83036 HEMOGLOBIN GLYCOSYLATED A1C: CPT

## 2022-05-27 PROCEDURE — 85025 COMPLETE CBC W/AUTO DIFF WBC: CPT

## 2022-05-27 PROCEDURE — 85730 THROMBOPLASTIN TIME PARTIAL: CPT

## 2022-05-27 PROCEDURE — 74176 CT ABD & PELVIS W/O CONTRAST: CPT | Mod: ME

## 2022-05-27 PROCEDURE — 85610 PROTHROMBIN TIME: CPT

## 2022-05-27 PROCEDURE — 85027 COMPLETE CBC AUTOMATED: CPT

## 2022-05-27 PROCEDURE — G1004: CPT

## 2022-05-27 PROCEDURE — 86850 RBC ANTIBODY SCREEN: CPT

## 2022-05-27 PROCEDURE — 86900 BLOOD TYPING SEROLOGIC ABO: CPT

## 2022-05-27 PROCEDURE — 87635 SARS-COV-2 COVID-19 AMP PRB: CPT

## 2022-05-27 PROCEDURE — 36415 COLL VENOUS BLD VENIPUNCTURE: CPT

## 2022-05-27 PROCEDURE — 93005 ELECTROCARDIOGRAM TRACING: CPT

## 2022-05-27 PROCEDURE — 99285 EMERGENCY DEPT VISIT HI MDM: CPT

## 2022-05-27 RX ORDER — BENZOCAINE AND MENTHOL 5; 1 G/100ML; G/100ML
1 LIQUID ORAL ONCE
Refills: 0 | Status: COMPLETED | OUTPATIENT
Start: 2022-05-27 | End: 2022-05-27

## 2022-05-27 RX ADMIN — Medication 250 MILLIGRAM(S): at 05:40

## 2022-05-27 RX ADMIN — BENZOCAINE AND MENTHOL 1 LOZENGE: 5; 1 LIQUID ORAL at 02:06

## 2022-05-27 NOTE — PROGRESS NOTE ADULT - NUTRITIONAL ASSESSMENT
MEDICATIONS  (STANDING):  ciprofloxacin     Tablet 250 milliGRAM(s) Oral every 12 hours  finasteride 5 milliGRAM(s) Oral daily  pantoprazole  Injectable 40 milliGRAM(s) IV Push daily  sodium chloride 0.9%. 1000 milliLiter(s) (75 mL/Hr) IV Continuous <Continuous>

## 2022-05-27 NOTE — PROGRESS NOTE ADULT - ASSESSMENT
66YO M PMH BPH self caths,seen in ED yesterday after he self catheterized w/o use of lubricant [place] while in the park, with ensuing gross hematuria.  Pt presented to ER and castillo was placed.  Pt returned today to ER with clot retention.  Castillo was switched to a 3 way catheter in the ER, and CBI was started. Pt denies fever chills n/v/d CP SOB. UA with hematuria. There was a concern for possible UTI.        CHRONIC KIDNEY DISEASE STAGE 3   and ACUTE RENAL FAILURE: Gross hematuria from traumatic urethral catheterization  Continue CBI until bleeding stops  sodium chloride 0.9%. 1000 milliLiter(s) (75 mL/Hr) IV   finasteride 5 milliGRAM(s) Oral daily  Serum creatinine is  at 2.6    , approximating GFR at   ml/min.   There is no progression . No uremic symptoms  No evidence of anemia .  Fluid status stable.  Will continue to avoid nephrotoxic drugs.  Patient remains asymptomatic.   Continue current therapy.      f/u  blood and urine cx,serial lactate levels,monitor vitals closley,ivfs hydration,monitor urine output and renal profile,iv abx as per id cons  ciprofloxacin     Tablet 250 milliGRAM(s) Oral every 12 hours            
66YO M PMH BPH self caths,seen in ED yesterday after he self catheterized w/o use of lubricant yesterday while in the park, with ensuing gross hematuria.  Pt presented to ER and castillo was placed.  Pt returned today to ER with clot retention.  Castillo was switched to a 3 way catheter in the ER, and CBI was started. Pt denies fever chills n/v/d CP SOB. UA with hematuria. There was a concern for possible UTI.        CHRONIC KIDNEY DISEASE STAGE 3   and ACUTE RENAL FAILURE: Gross hematuria from traumatic urethral catheterization  Continue CBI until bleeding stops  sodium chloride 0.9%. 1000 milliLiter(s) (75 mL/Hr) IV   finasteride 5 milliGRAM(s) Oral daily  Serum creatinine is  at 2.6    , approximating GFR at   ml/min.   There is no progression . No uremic symptoms  No evidence of anemia .  Fluid status stable.  Will continue to avoid nephrotoxic drugs.  Patient remains asymptomatic.   Continue current therapy.  hold  diuretic.  hold   ACE inhibitor.  hold   ARB.      f/u  blood and urine cx,serial lactate levels,monitor vitals myrna monroe hydration,monitor urine output and renal profile,iv abx as per id cons  ciprofloxacin     Tablet 250 milliGRAM(s) Oral every 12 hours            
I manually irrigated castillo and no clots were evident.  Stop CBI in AM  Can d/c home tomorrow w/o castillo if hematuria does not recur  Continue few days of Cipro per ID.
CBI stopped; castillo removed  Pt to self catheterize with a 20 Fr castillo at bedside  d/c home in AM    Discussed plan in detail with Dr Candido Nguyen, nursing staff, and patient.
66YO M PMH BPH self caths,seen in ED yesterday after he self catheterized w/o use of lubricant [place] while in the park, with ensuing gross hematuria.  Pt presented to ER and castillo was placed.  Pt returned today to ER with clot retention.  Castillo was switched to a 3 way catheter in the ER, and CBI was started. Pt denies fever chills n/v/d CP SOB. UA with hematuria. There was a concern for possible UTI.        CHRONIC KIDNEY DISEASE STAGE 3   and ACUTE RENAL FAILURE: Gross hematuria from traumatic urethral catheterization  Continue CBI until bleeding stops  sodium chloride 0.9%. 1000 milliLiter(s) (75 mL/Hr) IV   finasteride 5 milliGRAM(s) Oral daily  Serum creatinine is  at 2.6    , approximating GFR at   ml/min.   There is no progression . No uremic symptoms  No evidence of anemia .  Fluid status stable.  Will continue to avoid nephrotoxic drugs.  Patient remains asymptomatic.   Continue current therapy.  hold  diuretic.  hold   ACE inhibitor.  hold   ARB.      f/u  blood and urine cx,serial lactate levels,monitor vitals myrna monroe hydration,monitor urine output and renal profile,iv abx as per id cons  ciprofloxacin     Tablet 250 milliGRAM(s) Oral every 12 hours

## 2022-05-27 NOTE — PROGRESS NOTE ADULT - PROVIDER SPECIALTY LIST ADULT
Infectious Disease
Nephrology
Cardiology
Internal Medicine
Urology
Infectious Disease
Internal Medicine
Nephrology
Urology
Nephrology

## 2022-05-27 NOTE — PROGRESS NOTE ADULT - REASON FOR ADMISSION
self catheterization urine while in park without lubricant,,,began to bleed profusely
self catheterization urine while in park,,began to bleed
self catheterization urine while in park without lubricant,,,began to bleed profusely

## 2022-05-27 NOTE — DISCHARGE NOTE NURSING/CASE MANAGEMENT/SOCIAL WORK - PATIENT PORTAL LINK FT
You can access the FollowMyHealth Patient Portal offered by [facility] by registering at the following website: [email or web address]. By joining [facility]’s FollowMyHealth portal, you will also be able to view your health information using other applications (apps) compatible with our system.

## 2022-05-27 NOTE — PROGRESS NOTE ADULT - SUBJECTIVE AND OBJECTIVE BOX
[facility] Cardiology    CHIEF COMPLAINT: Patient is a 67y old  Male who presents with a chief complaint of self catheterization urine while in park without lubricant,,,began to bleed profusely (26 May 2022 23:11)      Follow Up: [ ] Chest Pain      [ ] Dyspnea     [ ] Palpitations    [ ] Atrial Fibrillation     [ ] Ventricular Dysrhythmia    [ ] Abnormal EKG                      [ ] Abnormal Cardiac Enzymes     [ ] Valvular Disease    HPI:    PAST MEDICAL & SURGICAL HISTORY:  Gout      Enlarged prostate      No significant past surgical history        MEDICATIONS  (STANDING):  ciprofloxacin     Tablet 250 milliGRAM(s) Oral every 12 hours  epoetin zheng-epbx (RETACRIT) Injectable 02684 Unit(s) SubCutaneous <User Schedule>  finasteride 5 milliGRAM(s) Oral daily  pantoprazole  Injectable 40 milliGRAM(s) IV Push daily  sodium chloride 0.9%. 1000 milliLiter(s) (100 mL/Hr) IV Continuous <Continuous>    MEDICATIONS  (PRN):  acetaminophen     Tablet .. 650 milliGRAM(s) Oral every 6 hours PRN Temp greater or equal to 38C (100.4F), Mild Pain (1 - 3)    Allergies    No Known Allergies    Intolerances        REVIEW OF SYSTEMS:    CONSTITUTIONAL: No weakness, fevers or chills.   EYES/ENT: No visual changes;    NECK: No pain or stiffness  RESPIRATORY: No cough, wheezing, No shortness of breath  CARDIOVASCULAR: No chest pain or palpitations  GASTROINTESTINAL: No abdominal pain, or hematochezia.  GENITOURINARY: No dysuria   hematuria  NEUROLOGICAL: No numbness or weakness  SKIN: No itching, burning, rashes  All other review of systems is negative unless indicated above    Vital Signs Last 24 Hrs  T(C): 36.7 (27 May 2022 05:32), Max: 36.9 (26 May 2022 20:43)  T(F): 98 (27 May 2022 05:32), Max: 98.4 (26 May 2022 20:43)  HR: 74 (27 May 2022 05:32) (66 - 75)  BP: 110/74 (27 May 2022 05:32) (98/62 - 110/74)  BP(mean): --  RR: 17 (27 May 2022 05:32) (17 - 18)  SpO2: 95% (27 May 2022 05:32) (93% - 95%)  I&O's Summary    26 May 2022 07:01  -  27 May 2022 07:00  --------------------------------------------------------  IN: 6800 mL / OUT: 8000 mL / NET: -1200 mL        pt being discharged                          8.7    11.72 )-----------( 256      ( 26 May 2022 10:07 )             27.7     05-26    139  |  111<H>  |  42<H>  ----------------------------<  113<H>  4.5   |  23  |  2.70<H>    Ca    9.6      26 May 2022 10:07    TPro  6.5  /  Alb  2.7<L>  /  TBili  0.7  /  DBili  x   /  AST  10<L>  /  ALT  15  /  AlkPhos  71  05-26          12 Lead ECG:   Ventricular Rate 87 BPM    Atrial Rate 87 BPM    P-R Interval 192 ms    QRS Duration 72 ms    Q-T Interval 338 ms    QTC Calculation(Bazett) 406 ms    P Axis 43 degrees    R Axis -38 degrees    T Axis 15 degrees    Diagnosis Line Normal sinus rhythm  Left axis deviation  Low voltage QRS  Cannot rule out Inferior infarct , age undetermined  Abnormal ECG  When compared with ECG of 29-APR-2022 09:27,  No significant change was found    Confirmed by Ap MOSER, Logan ([id number]) on 5/25/2022 1:28:53 PM (05-24-22 @ 19:43)  cardio consult for possible pre-op  no plan for cystoscopy  likely D/C  call if needed

## 2022-05-27 NOTE — DISCHARGE NOTE NURSING/CASE MANAGEMENT/SOCIAL WORK - NSDCPEFALRISK_GEN_ALL_CORE
For information on Fall & Injury Prevention, visit: [email or web address] OR  [email or web address] OR  https://www.cdc.gov/steadi/patient.html

## 2022-05-27 NOTE — PROGRESS NOTE ADULT - SUBJECTIVE AND OBJECTIVE BOX
Patient is a 67y Male whom presented to the hospital with ckd and aaron     PAST MEDICAL & SURGICAL HISTORY:  Gout      Enlarged prostate      No significant past surgical history          MEDICATIONS  (STANDING):  finasteride 5 milliGRAM(s) Oral daily  pantoprazole  Injectable 40 milliGRAM(s) IV Push daily  sodium chloride 0.9%. 1000 milliLiter(s) (75 mL/Hr) IV Continuous <Continuous>      Allergies    No Known Allergies    Intolerances        SOCIAL HISTORY:  Denies ETOh,Smoking,     FAMILY HISTORY:      REVIEW OF SYSTEMS:    CONSTITUTIONAL: No weakness, fevers or chills  RESPIRATORY: No cough, wheezing, hemoptysis; No shortness of breath  CARDIOVASCULAR: No chest pain or palpitations  GASTROINTESTINAL: No abdominal or epigastric pain. No nausea, vomiting,     No diarrhea or constipation. No melena                                                   8.7    11.72 )-----------( 256      ( 26 May 2022 10:07 )             27.7       CBC Full  -  ( 26 May 2022 10:07 )  WBC Count : 11.72 K/uL  RBC Count : 3.09 M/uL  Hemoglobin : 8.7 g/dL  Hematocrit : 27.7 %  Platelet Count - Automated : 256 K/uL  Mean Cell Volume : 89.6 fl  Mean Cell Hemoglobin : 28.2 pg  Mean Cell Hemoglobin Concentration : 31.4 gm/dL  Auto Neutrophil # : 8.40 K/uL  Auto Lymphocyte # : 1.71 K/uL  Auto Monocyte # : 1.20 K/uL  Auto Eosinophil # : 0.27 K/uL  Auto Basophil # : 0.04 K/uL  Auto Neutrophil % : 71.7 %  Auto Lymphocyte % : 14.6 %  Auto Monocyte % : 10.2 %  Auto Eosinophil % : 2.3 %  Auto Basophil % : 0.3 %      05-26    139  |  111<H>  |  42<H>  ----------------------------<  113<H>  4.5   |  23  |  2.70<H>    Ca    9.6      26 May 2022 10:07    TPro  6.5  /  Alb  2.7<L>  /  TBili  0.7  /  DBili  x   /  AST  10<L>  /  ALT  15  /  AlkPhos  71  05-26      CAPILLARY BLOOD GLUCOSE          Vital Signs Last 24 Hrs  T(C): 36.7 (27 May 2022 05:32), Max: 36.9 (26 May 2022 20:43)  T(F): 98 (27 May 2022 05:32), Max: 98.4 (26 May 2022 20:43)  HR: 74 (27 May 2022 05:32) (74 - 75)  BP: 110/74 (27 May 2022 05:32) (103/62 - 110/74)  BP(mean): --  RR: 17 (27 May 2022 05:32) (17 - 17)  SpO2: 95% (27 May 2022 05:32) (93% - 95%)              PHYSICAL EXAM:    Constitutional: NAD  HEENT: conjunctive   clear   Neck:  No JVD  Respiratory: CTAB  Cardiovascular: S1 and S2  Gastrointestinal: BS+, soft, NT/ND  Extremities: No peripheral edema

## 2022-05-30 ENCOUNTER — TRANSCRIPTION ENCOUNTER (OUTPATIENT)
Age: 67
End: 2022-05-30

## 2022-05-30 PROCEDURE — 84484 ASSAY OF TROPONIN QUANT: CPT

## 2022-05-30 PROCEDURE — 87186 SC STD MICRODIL/AGAR DIL: CPT

## 2022-05-30 PROCEDURE — 82150 ASSAY OF AMYLASE: CPT

## 2022-05-30 PROCEDURE — 84100 ASSAY OF PHOSPHORUS: CPT

## 2022-05-30 PROCEDURE — 87635 SARS-COV-2 COVID-19 AMP PRB: CPT

## 2022-05-30 PROCEDURE — 36415 COLL VENOUS BLD VENIPUNCTURE: CPT

## 2022-05-30 PROCEDURE — 74176 CT ABD & PELVIS W/O CONTRAST: CPT | Mod: MA

## 2022-05-30 PROCEDURE — 88304 TISSUE EXAM BY PATHOLOGIST: CPT

## 2022-05-30 PROCEDURE — 86803 HEPATITIS C AB TEST: CPT

## 2022-05-30 PROCEDURE — 86850 RBC ANTIBODY SCREEN: CPT

## 2022-05-30 PROCEDURE — 86900 BLOOD TYPING SEROLOGIC ABO: CPT

## 2022-05-30 PROCEDURE — 71045 X-RAY EXAM CHEST 1 VIEW: CPT

## 2022-05-30 PROCEDURE — 80053 COMPREHEN METABOLIC PANEL: CPT

## 2022-05-30 PROCEDURE — 83605 ASSAY OF LACTIC ACID: CPT

## 2022-05-30 PROCEDURE — 96374 THER/PROPH/DIAG INJ IV PUSH: CPT

## 2022-05-30 PROCEDURE — C1889: CPT

## 2022-05-30 PROCEDURE — 85027 COMPLETE CBC AUTOMATED: CPT

## 2022-05-30 PROCEDURE — 93005 ELECTROCARDIOGRAM TRACING: CPT

## 2022-05-30 PROCEDURE — 81001 URINALYSIS AUTO W/SCOPE: CPT

## 2022-05-30 PROCEDURE — 96375 TX/PRO/DX INJ NEW DRUG ADDON: CPT

## 2022-05-30 PROCEDURE — 76937 US GUIDE VASCULAR ACCESS: CPT

## 2022-05-30 PROCEDURE — 87077 CULTURE AEROBIC IDENTIFY: CPT

## 2022-05-30 PROCEDURE — 83735 ASSAY OF MAGNESIUM: CPT

## 2022-05-30 PROCEDURE — 87040 BLOOD CULTURE FOR BACTERIA: CPT

## 2022-05-30 PROCEDURE — C1751: CPT

## 2022-05-30 PROCEDURE — 87086 URINE CULTURE/COLONY COUNT: CPT

## 2022-05-30 PROCEDURE — 87150 DNA/RNA AMPLIFIED PROBE: CPT

## 2022-05-30 PROCEDURE — 99285 EMERGENCY DEPT VISIT HI MDM: CPT | Mod: 25

## 2022-05-30 PROCEDURE — 36558 INSERT TUNNELED CV CATH: CPT

## 2022-05-30 PROCEDURE — 80048 BASIC METABOLIC PNL TOTAL CA: CPT

## 2022-05-30 PROCEDURE — 77001 FLUOROGUIDE FOR VEIN DEVICE: CPT

## 2022-05-30 PROCEDURE — 83690 ASSAY OF LIPASE: CPT

## 2022-05-30 PROCEDURE — 85610 PROTHROMBIN TIME: CPT

## 2022-05-30 PROCEDURE — 85730 THROMBOPLASTIN TIME PARTIAL: CPT

## 2022-05-30 PROCEDURE — 85025 COMPLETE CBC W/AUTO DIFF WBC: CPT

## 2022-05-30 PROCEDURE — 86901 BLOOD TYPING SEROLOGIC RH(D): CPT

## 2022-08-04 ENCOUNTER — APPOINTMENT (OUTPATIENT)
Dept: OTOLARYNGOLOGY | Facility: CLINIC | Age: 67
End: 2022-08-04

## 2022-08-04 VITALS
WEIGHT: 175 LBS | HEIGHT: 69 IN | DIASTOLIC BLOOD PRESSURE: 73 MMHG | BODY MASS INDEX: 25.92 KG/M2 | SYSTOLIC BLOOD PRESSURE: 141 MMHG | HEART RATE: 64 BPM

## 2022-08-04 DIAGNOSIS — R68.89 OTHER GENERAL SYMPTOMS AND SIGNS: ICD-10-CM

## 2022-08-04 DIAGNOSIS — Z87.448 PERSONAL HISTORY OF OTHER DISEASES OF URINARY SYSTEM: ICD-10-CM

## 2022-08-04 DIAGNOSIS — K21.9 GASTRO-ESOPHAGEAL REFLUX DISEASE W/OUT ESOPHAGITIS: ICD-10-CM

## 2022-08-04 DIAGNOSIS — J39.2 OTHER DISEASES OF PHARYNX: ICD-10-CM

## 2022-08-04 PROCEDURE — 99203 OFFICE O/P NEW LOW 30 MIN: CPT | Mod: 25

## 2022-08-04 PROCEDURE — 31575 DIAGNOSTIC LARYNGOSCOPY: CPT

## 2022-08-04 RX ORDER — PILOCARPINE HYDROCHLORIDE 5 MG/1
5 TABLET, FILM COATED ORAL
Qty: 15 | Refills: 0 | Status: ACTIVE | COMMUNITY
Start: 2022-04-20

## 2022-08-04 RX ORDER — ALBUTEROL SULFATE 90 UG/1
108 (90 BASE) INHALANT RESPIRATORY (INHALATION)
Qty: 7 | Refills: 0 | Status: ACTIVE | COMMUNITY
Start: 2022-03-10

## 2022-08-04 RX ORDER — MONTELUKAST 10 MG/1
10 TABLET, FILM COATED ORAL
Qty: 30 | Refills: 0 | Status: ACTIVE | COMMUNITY
Start: 2022-08-01

## 2022-08-04 RX ORDER — FAMOTIDINE 20 MG/1
20 TABLET, FILM COATED ORAL
Qty: 90 | Refills: 0 | Status: ACTIVE | COMMUNITY
Start: 2022-08-04 | End: 1900-01-01

## 2022-08-04 RX ORDER — OMEPRAZOLE 40 MG/1
40 CAPSULE, DELAYED RELEASE ORAL
Qty: 30 | Refills: 0 | Status: ACTIVE | COMMUNITY
Start: 2022-08-01

## 2022-08-04 NOTE — REVIEW OF SYSTEMS
[Post Nasal Drip] : post nasal drip [Throat Dryness] : throat dryness [Throat Itching] : throat itching [Negative] : Genitourinary

## 2022-08-04 NOTE — CONSULT LETTER
[Dear  ___] : Dear  [unfilled], [Consult Letter:] : I had the pleasure of evaluating your patient, [unfilled]. [Please see my note below.] : Please see my note below. [Consult Closing:] : Thank you very much for allowing me to participate in the care of this patient.  If you have any questions, please do not hesitate to contact me. [Sincerely,] : Sincerely, [FreeTextEntry3] : Demetrio Lopez M.D.

## 2022-08-04 NOTE — HISTORY OF PRESENT ILLNESS
[de-identified] : Gabriel Aguillon is a 66 yo male with hx renal issues who was referred by Dr. Montes for evaluation of dry throat. He notes that this has been going on for over six months. He was seen by outside ENT and prescribed  pilocarpine to increase salivary production but notes that this has not helped. He has had labwork and was told that he does not have Srogren's. He notes he drinks signfiicant amounts of water. He denies any pain or swelling of his salivary glands. He has history of acid reflux but has been handlign this with dietary and lifestyle measures. He states that his mouth does not feel dry, just the throat. He notes chronic throat clearing. He denies dysphagia, odynophagia, dysphonia, dyspnea, aspiration episodes. He denies fevers, night sweats, unintentional weight loss, or new neck masses.

## 2022-08-04 NOTE — ASSESSMENT
[FreeTextEntry1] : Gabriel Aguillon presents for evaluation of throat dryness and chronic throat clearing. He denies oral cavity dryness. He has been worked up for Srogren's per his report and this was negative. He has been taking pilocarpine without effect. He has history of GERD for which he implements lifestyle measures. On exam, he has good salivary flow and moist mucosa of his oral cavity. Flexible laryngoscopy showed postcricoid inflammation. Suspect that laryngopharyngeal reflux is contributing to his throat clearing and sensation of throat dryness. Will start famotidine as below.\par \par - Antireflux lifestyle and dietary measures.\par - famotidine 20 mg qhs. He will talk to his PCP prior to starting this given his renal issues.\par - Follow up in 3 months.

## 2022-08-04 NOTE — PHYSICAL EXAM
[Midline] : trachea located in midline position [de-identified] : Poor dentition [de-identified] : Moist mucosa and adequate salivary flow [Normal] : no rashes

## 2022-08-17 ENCOUNTER — APPOINTMENT (OUTPATIENT)
Dept: SURGERY | Facility: CLINIC | Age: 67
End: 2022-08-17

## 2022-08-21 ENCOUNTER — EMERGENCY (EMERGENCY)
Facility: HOSPITAL | Age: 67
LOS: 1 days | Discharge: LEFT BEFORE DISCHARGE | End: 2022-08-21
Attending: EMERGENCY MEDICINE | Admitting: EMERGENCY MEDICINE
Payer: MEDICAID

## 2022-08-21 VITALS
HEIGHT: 69 IN | WEIGHT: 179.9 LBS | TEMPERATURE: 97 F | SYSTOLIC BLOOD PRESSURE: 150 MMHG | DIASTOLIC BLOOD PRESSURE: 78 MMHG | HEART RATE: 73 BPM | OXYGEN SATURATION: 99 % | RESPIRATION RATE: 18 BRPM

## 2022-08-21 PROCEDURE — 99282 EMERGENCY DEPT VISIT SF MDM: CPT

## 2022-08-21 NOTE — ED PROVIDER NOTE - CONSTITUTIONAL, MLM
Well appearing, white male, slightly disheveled, awake, alert, oriented to person, place, time/situation and in no apparent distress. normal...

## 2022-08-21 NOTE — ED PROVIDER NOTE - CHPI ED SYMPTOMS NEG
no chest pain/no cough/no fever/no hemoptysis/no shortness of breath/no wheezing/no chills/no diaphoresis

## 2022-08-21 NOTE — ED PROVIDER NOTE - OBJECTIVE STATEMENT
66 yo white male states that he is here today for evaluation of excessive mucous production, more so in the morning x months for which he has seen his PCP for many times. Denies any fever, chills, chest pain, SOB, cough, hemoptysis or night sweats.

## 2022-09-08 ENCOUNTER — EMERGENCY (EMERGENCY)
Facility: HOSPITAL | Age: 67
LOS: 1 days | Discharge: HOME | End: 2022-09-08
Attending: EMERGENCY MEDICINE | Admitting: EMERGENCY MEDICINE
Payer: MEDICAID

## 2022-09-08 VITALS
WEIGHT: 179.9 LBS | SYSTOLIC BLOOD PRESSURE: 166 MMHG | TEMPERATURE: 98 F | DIASTOLIC BLOOD PRESSURE: 83 MMHG | OXYGEN SATURATION: 98 % | HEIGHT: 69 IN | HEART RATE: 74 BPM

## 2022-09-08 VITALS
TEMPERATURE: 98 F | OXYGEN SATURATION: 97 % | RESPIRATION RATE: 16 BRPM | SYSTOLIC BLOOD PRESSURE: 137 MMHG | HEART RATE: 76 BPM | DIASTOLIC BLOOD PRESSURE: 73 MMHG

## 2022-09-08 PROCEDURE — 71046 X-RAY EXAM CHEST 2 VIEWS: CPT | Mod: 26

## 2022-09-08 PROCEDURE — 99284 EMERGENCY DEPT VISIT MOD MDM: CPT | Mod: 25

## 2022-09-08 PROCEDURE — 99284 EMERGENCY DEPT VISIT MOD MDM: CPT

## 2022-09-08 PROCEDURE — 94640 AIRWAY INHALATION TREATMENT: CPT

## 2022-09-08 PROCEDURE — 71046 X-RAY EXAM CHEST 2 VIEWS: CPT

## 2022-09-08 PROCEDURE — 70360 X-RAY EXAM OF NECK: CPT

## 2022-09-08 PROCEDURE — 70360 X-RAY EXAM OF NECK: CPT | Mod: 26

## 2022-09-08 RX ORDER — ALBUTEROL 90 UG/1
2 AEROSOL, METERED ORAL
Qty: 1 | Refills: 0
Start: 2022-09-08 | End: 2022-09-21

## 2022-09-08 RX ORDER — FEXOFENADINE HCL 30 MG
1 TABLET ORAL
Qty: 30 | Refills: 0
Start: 2022-09-08

## 2022-09-08 RX ORDER — IPRATROPIUM/ALBUTEROL SULFATE 18-103MCG
3 AEROSOL WITH ADAPTER (GRAM) INHALATION ONCE
Refills: 0 | Status: COMPLETED | OUTPATIENT
Start: 2022-09-08 | End: 2022-09-08

## 2022-09-08 RX ADMIN — Medication 3 MILLILITER(S): at 02:51

## 2022-09-08 NOTE — ED ADULT NURSE NOTE - SUICIDE SCREENING QUESTION 3
Per chart review, pt did not present to an [facility]ER ED. No info found in \"care everywhere\". No upcomig appointments found. Please follow up with pt. No

## 2022-09-08 NOTE — ED PROVIDER NOTE - CARE PROVIDER_API CALL
Carolyn Mills ()  Medicine  [address]  [place] [address]  Phone: [phone]  Fax: [phone]  Follow Up Time:     Demetrio Lopez)  [facility]   [address]  [place] [address]  Phone: [phone]  Fax: [phone]  Follow Up Time:

## 2022-09-08 NOTE — ED ADULT TRIAGE NOTE - CHIEF COMPLAINT QUOTE
67yr old male arrived to ED c/o throat irritation starting about an hour ago, unproductive cough at times, patient able to speak in full sentences, no obvious obstruction noted to airway.

## 2022-09-08 NOTE — ED PROVIDER NOTE - CLINICAL SUMMARY MEDICAL DECISION MAKING FREE TEXT BOX
Chronic sx cw post-nasal drip with mild cough. no acute changes. Check XR, duoneb, outpt fu with ENT

## 2022-09-08 NOTE — ED PROVIDER NOTE - ENMT, MLM
Airway patent, Nasal mucosa clear. Mouth with normal mucosa. Throat has no vesicles, no oropharyngeal exudates and uvula is midline. neck supple. no meningeal signs. no pharyngeal edema .

## 2022-09-08 NOTE — ED PROVIDER NOTE - PATIENT PORTAL LINK FT
You can access the FollowMyHealth Patient Portal offered by [facility] by registering at the following website: [email or web address]. By joining [employer]’s FollowMyHealth portal, you will also be able to view your health information using other applications (apps) compatible with our system.

## 2022-09-08 NOTE — ED PROVIDER NOTE - NSFOLLOWUPINSTRUCTIONS_ED_ALL_ED_FT
1) Follow-up with your Primary Medical Doctor. Call in morning for prompt follow-up.  2) Return to Emergency room for any worsening or persistent pain, weakness, fever, or any other concerning symptoms.  3) See attached instruction sheets for additional information, including information regarding signs and symptoms to look out for, reasons to seek immediate care and other important instructions.  4) Follow-up with ENT this week as prescribed  5) Albuterol as needed  6) Allegra once daily

## 2022-09-08 NOTE — ED ADULT NURSE NOTE - OBJECTIVE STATEMENT
Pt presented to ED c/o throat pain and dry mouth.  Pt denies any chest pain or SOB.  No n/v/d.  Pt to follow up with ENT.  Maintain comfort and safety.

## 2022-09-08 NOTE — ED PROVIDER NOTE - OBJECTIVE STATEMENT
67-year-old male who with history of chronic renal insufficiency, BPH presents with has been having several months of postnasal drip, feeling of phlegm behind his throat, occasional on and off dry cough.  No fevers or chills.  No acute dyspnea.  Patient states felt it again tonight when laying flat.  No known COVID exposures.  Patient is not vaccinated for COVID.  No acute abdominal pain/vomiting/diarrhea.  No dyspnea on exertion/easy fatigue.  No acute chest pain.  No aggravating or alleviating factors.  No other acute complaints.  Patient states has been following with ENT, and has an appointment in 2 days for follow-up with ENT Dr. Demetrio Lopez.

## 2022-09-09 ENCOUNTER — APPOINTMENT (OUTPATIENT)
Dept: OTOLARYNGOLOGY | Facility: CLINIC | Age: 67
End: 2022-09-09

## 2022-11-03 ENCOUNTER — APPOINTMENT (OUTPATIENT)
Dept: OTOLARYNGOLOGY | Facility: CLINIC | Age: 67
End: 2022-11-03

## 2023-02-03 ENCOUNTER — EMERGENCY (EMERGENCY)
Facility: HOSPITAL | Age: 68
LOS: 1 days | Discharge: LEFT BEFORE DISCHARGE | End: 2023-02-03
Attending: STUDENT IN AN ORGANIZED HEALTH CARE EDUCATION/TRAINING PROGRAM | Admitting: STUDENT IN AN ORGANIZED HEALTH CARE EDUCATION/TRAINING PROGRAM
Payer: MEDICAID

## 2023-02-03 VITALS
SYSTOLIC BLOOD PRESSURE: 160 MMHG | OXYGEN SATURATION: 99 % | DIASTOLIC BLOOD PRESSURE: 90 MMHG | HEART RATE: 100 BPM | TEMPERATURE: 98 F | RESPIRATION RATE: 19 BRPM

## 2023-02-03 VITALS
SYSTOLIC BLOOD PRESSURE: 162 MMHG | WEIGHT: 184.97 LBS | HEART RATE: 110 BPM | DIASTOLIC BLOOD PRESSURE: 99 MMHG | OXYGEN SATURATION: 99 % | HEIGHT: 69 IN | TEMPERATURE: 98 F

## 2023-02-03 PROCEDURE — 99284 EMERGENCY DEPT VISIT MOD MDM: CPT

## 2023-02-03 PROCEDURE — 94640 AIRWAY INHALATION TREATMENT: CPT

## 2023-02-03 PROCEDURE — 99283 EMERGENCY DEPT VISIT LOW MDM: CPT | Mod: 25

## 2023-02-03 RX ORDER — IPRATROPIUM/ALBUTEROL SULFATE 18-103MCG
3 AEROSOL WITH ADAPTER (GRAM) INHALATION ONCE
Refills: 0 | Status: COMPLETED | OUTPATIENT
Start: 2023-02-03 | End: 2023-02-03

## 2023-02-03 RX ADMIN — Medication 3 MILLILITER(S): at 15:59

## 2023-02-03 NOTE — ED PROVIDER NOTE - NSFOLLOWUPINSTRUCTIONS_ED_ALL_ED_FT
You are leaving Against Medical Advice.  Please follow up with your Primary Care Physician and any specialists as discussed.  Please take your medications as prescribed.  If your symptoms persist or worsen, please seek care. Either return to the Emergency Department, go to urgent care or see your primary care doctor.  Please refer to general information and instructions below:     Shortness of Breath    WHAT YOU NEED TO KNOW:    Shortness of breath is a feeling that you cannot get enough air when you breathe in. You may have this feeling only during activity, or all the time. Your symptoms can range from mild to severe. Shortness of breath may be a sign of a serious health condition that needs immediate care.    DISCHARGE INSTRUCTIONS:    Return to the emergency department if:     Your signs and symptoms are the same or worse within 24 hours of treatment.       The skin over your ribs or on your neck sinks in when you breathe.       You feel confused or dizzy.    Contact your healthcare provider if:     You have new or worsening symptoms.      You have questions or concerns about your condition or care.    Medicines:     Medicines may be used to treat the cause of your symptoms. You may need medicine to treat a bacterial infection or reduce anxiety. Other medicines may be used to open your airway, reduce swelling, or remove extra fluid. If you have a heart condition, you may need medicine to help your heart beat more strongly or regularly.      Take your medicine as directed. Contact your healthcare provider if you think your medicine is not helping or if you have side effects. Tell him or her if you are allergic to any medicine. Keep a list of the medicines, vitamins, and herbs you take. Include the amounts, and when and why you take them. Bring the list or the pill bottles to follow-up visits. Carry your medicine list with you in case of an emergency.    Manage shortness of breath:     Create an action plan. You and your healthcare provider can work together to create a plan for how to handle shortness of breath. The plan can include daily activities, treatment changes, and what to do if you have severe breathing problems.      Lean forward on your elbows when you sit. This helps your lungs expand and may make it easier to breathe.      Use pursed-lip breathing any time you feel short of breath. Breathe in through your nose and then slowly breathe out through your mouth with your lips slightly puckered. It should take you twice as long to breathe out as it did to breathe in.Breathe in Breathe out           Do not smoke. Nicotine and other chemicals in cigarettes and cigars can cause lung damage and make shortness of breath worse. Ask your healthcare provider for information if you currently smoke and need help to quit. E-cigarettes or smokeless tobacco still contain nicotine. Talk to your healthcare provider before you use these products.      Reach or maintain a healthy weight. Your healthcare provider can help you create a safe weight loss plan if you are overweight.      Exercise as directed. Exercise can help your lungs work more easily. Exercise can also help you lose weight if needed. Try to get at least 30 minutes of exercise most days of the week.    Follow up with your healthcare provider or specialist as directed: Write down your questions so you remember to ask them during your visits.

## 2023-02-03 NOTE — ED PROVIDER NOTE - CARE PROVIDER_API CALL
Carolyn Mills (DO)  Medicine  [address]  [place] [address]  Phone: [phone]  Fax: [phone]  Established Patient  Follow Up Time: 1-3 Days

## 2023-02-03 NOTE — ED ADULT NURSE NOTE - OBJECTIVE STATEMENT
Pt presents to the ED s/p SOB .Pt states" I only came for a nebulizer treatment. Pt refused to put on a gown.

## 2023-02-03 NOTE — ED PROVIDER NOTE - CLINICAL SUMMARY MEDICAL DECISION MAKING FREE TEXT BOX
Here with several hours of shortness of breath reportedly from phlegm in throat.  Patient adamantly declines any work-up including labs, chest x-ray, EKG, cardiac monitor.  Patient only interested in trying nebulizer treatment as it worked for him in the past.  We will give treatment to build rapport with patient and see if after patient amenable for further work-up. Here with several hours of shortness of breath reportedly from phlegm in throat.  Patient adamantly declines any work-up including labs, chest x-ray, EKG, cardiac monitor.  Patient only interested in trying nebulizer treatment as it worked for him in the past.  We will give treatment to build rapport with patient and see if after patient amenable for further work-up.    patient feels somewhat better after neb, does not want to stay for further work up.

## 2023-02-03 NOTE — ED PROVIDER NOTE - PHYSICAL EXAMINATION
Vital signs as available reviewed.  General:  No acute distress.  Head:  Normocephalic, atraumatic.  Eyes:  Conjunctiva pink, no icterus.  Cardiovascular:  tachycardic.  Respiratory:  Clear to auscultation, good air entry bilaterally.  Musculoskeletal:  No obvious deformity.  Neurologic: Alert and oriented, moving all extremities.  Skin:  Warm and dry.

## 2023-02-03 NOTE — ED ADULT TRIAGE NOTE - CHIEF COMPLAINT QUOTE
c/o SOB since this AM. pt states "I think I need a nebulizer treatment." dry cough noted at this time. denies fevers, cp, dizziness, lightheadedness, body aches, chills, sick contact. no hx of covid vaccines.

## 2023-02-03 NOTE — ED PROVIDER NOTE - OBJECTIVE STATEMENT
68-year-old male history of reflux, BPH, CKD here demanding nebulizer treatment for mucus in his throat.  Patient reports for the past few hours it has made him feel short of breath and has a dry throat.  He used an albuterol inhaler 9 times at home without significant relief.  Patient denies history of asthma, COPD, smoking.  Patient denies chest pain, fevers and chills, sick contacts, lower extremity edema.  Patient reports few months ago he felt similar had a nebulizer treatment and felt better.  PMD is Dr. Candido Nguyen

## 2023-02-03 NOTE — ED ADULT NURSE NOTE - SUICIDE SCREENING QUESTION 3
FYI - Status Update    Who is Calling: Farida with [facility]     Update: Their fax machine is out of service.  Requesting completed form is emailed to their office at [email or web address]        
Faxed over the paperwork, made a copy and scanned into chart  
Received paperwork from patient's dentist on whether or not patient needs prophylactic antibiotics given history of TKA 2021.  Based on updated ADA (American dental Association) And American Academy of orthopedic surgeons (AAOS), routine use of antibiotic prophylaxis in patients with prosthetic joints is not recommended anymore.  Please call patient to let them know that they do not necessarily need the antibiotics.  I have filled out the paperwork as such and fax it over.    Nursing: The paperwork is in my outbox, please fax it over.  
emailed the form to the e-mail below on 10/6/22  
No

## 2023-02-03 NOTE — ED PROVIDER NOTE - PATIENT PORTAL LINK FT
You can access the FollowMyHealth Patient Portal offered by [facility] by registering at the following website: [email or web address]. By joining [employer]’s FollowMyHealth portal, you will also be able to view your health information using other applications (apps) compatible with our system.

## 2023-02-03 NOTE — ED ADULT NURSE NOTE - BREATHING, MLM
Patient reported she ate and took her Prograf this morning before labs, Lab appointment rescheduled to tomorrow.   Spontaneous, unlabored and symmetrical

## 2023-05-24 ENCOUNTER — NON-APPOINTMENT (OUTPATIENT)
Age: 68
End: 2023-05-24

## 2023-07-31 NOTE — ED PROVIDER NOTE - CPE EDP HEME LYMPH NORM
Rivaroxaban/Xarelto increases your risk for bleeding. Notify your doctor if you experience any of the following side effects: unusual bleeding or bruising, vomiting blood or coffee ground-like material, red or black stool, itching or hives, chest tightness, trouble breathing, swelling in your face or hands, swelling in your mouth or throat, change in how much or how often you urinate, red or brown urine, heavy menstrual or vaginal bleeding, or blistering or peeling skin. When Rivaroxaban/Xarelto is taken with other medicines, they can affect how it works. Taking other medications such as aspirin, antibiotics, antifungals, blood thinners, nonsteroidal anti-inflammatories, and medications that treat depression can increase your risk of bleeding. It is very important to tell your health care provider about all of the other medicines, including over-the-counter medications, herbs, and vitamins you are taking.  DO NOT start, stop, or change the dosage of any medicine, including over-the-counter medicines, vitamins, and herbal products without your doctor’s approval.  Any products containing aspirin or are nonsteroidal anti-inflammatories lessen the blood’s ability to form clots and adds to the effect of Rivaroxaban/Xarelto. Never take aspirin or medicines that contain aspirin without speaking to your doctor. normal...

## 2024-02-21 NOTE — ED ADULT NURSE NOTE - NSFALLRSKUNASSIST_ED_ALL_ED
PA for Testosterone has been sent to patient's insurance company and APPROVED!!              Approved today  Your PA request has been approved. Additional information will be provided in the approval communication.  Drug  Testosterone Cypionate 200MG/ML intramuscular solution  Form  [facility] Electronic PA Form (2017 NCPDP)  Original Claim Info  75 Prior Authorization Required   no

## 2024-05-08 NOTE — ED ADULT NURSE NOTE - CHPI ED NUR SYMPTOMS POS
What Is The Reason For Today's Visit?: Full Body Skin Examination What Is The Reason For Today's Visit? (Being Monitored For X): the risk of recurrence of previously treated lesion(s) SHORTNESS OF BREATH

## 2025-01-17 NOTE — DIETITIAN INITIAL EVALUATION ADULT - OTHER INFO
DATE/TIME OF CALL RECEIVED FROM LAB:  01/17/25 at 3:21 PM   LAB TEST:  Aerobic Bacterial Culture from 01/16  LAB VALUE:  Staphylococcus aureus     PROVIDER NOTIFIED?: Yes  PROVIDER NAME: Dr. Boo/Dr. Larsen   DATE/TIME LAB VALUE REPORTED TO PROVIDER: 3:28 PM 01/17/25  MECHANISM OF PROVIDER NOTIFICATION: Page  PROVIDER RESPONSE: Awaiting      68 y/o male adm with acute appendicitis. PMH enlarged prostate, gout. Pt s/p OR 4/28 lap appendectomy. Pt visited at bedside this am. Pt reports that he is tolerating diet well, no complaints. Pt states, "I'm going home now."

## 2025-02-27 NOTE — ED PROVIDER NOTE - CARE PROVIDERS DIRECT ADDRESSES
,[email or web address] Bleeding that does not stop/Swelling that gets worse/Pain not relieved by Medications/Fever greater than (need to indicate Fahrenheit or Celsius)

## 2025-04-21 NOTE — ED ADULT NURSE NOTE - CAS TRG GENERAL AIRWAY, MLM
Medication: lisinopril and magnesium oxide passed protocol.   Last office visit date: 4/7/25  Next appointment scheduled?: Yes   Number of refills given: 3     Patent

## 2025-07-11 NOTE — ED ADULT NURSE NOTE - IS THE PATIENT ABLE TO BE SCREENED?
Pt calling stating she did not get her refill for pain medication or gabapentin. Pt was last seen 7/10/2025.   Yes

## (undated) DEVICE — PLV-SCD MACHINE: Type: DURABLE MEDICAL EQUIPMENT

## (undated) DEVICE — PLV/PSP-SUCTION IRRIGATOR STRYKER: Type: DURABLE MEDICAL EQUIPMENT

## (undated) DEVICE — SUT MONOCRYL 4-0 27" PS-2 UNDYED

## (undated) DEVICE — FOLEY TRAY 16FR LF URINE METER SURESTEP

## (undated) DEVICE — NDL HYPO SAFE 25G X 1.5" (ORANGE)

## (undated) DEVICE — SUT SOFSILK 2-0 18" C-15

## (undated) DEVICE — GLV 8 PROTEXIS (BLUE)

## (undated) DEVICE — SOL IRR BAG NS 0.9% 3000ML

## (undated) DEVICE — DRAPE TOWEL BLUE 17" X 24"

## (undated) DEVICE — ENDOCATCH 10MM SPECIMEN POUCH

## (undated) DEVICE — DRAIN RESERVOIR FOR JACKSON PRATT 100CC CARDINAL

## (undated) DEVICE — TROCAR COVIDIEN VERSAONE FIXATION CANNULA 5MM

## (undated) DEVICE — DRSG DERMABOND 0.7ML

## (undated) DEVICE — APPLICATOR FOR ARISTA XL 38CM

## (undated) DEVICE — VENODYNE/SCD SLEEVE CALF LARGE

## (undated) DEVICE — SOL IRR POUR NS 0.9% 1000ML

## (undated) DEVICE — GLV 7.5 PROTEXIS (WHITE)

## (undated) DEVICE — VENODYNE/SCD SLEEVE CALF MEDIUM

## (undated) DEVICE — PLV-STRYKER LAPAROSCOPE 5MM 30 DEGREE: Type: DURABLE MEDICAL EQUIPMENT

## (undated) DEVICE — TUBING STRYKER PNEUMOSURE HI FLOW INSUFFLATOR

## (undated) DEVICE — ELCTR BOVIE PENCIL SMOKE EVACUATION

## (undated) DEVICE — SPONGE ENDO PEANUT 5MM

## (undated) DEVICE — DRAPE 3/4 SHEET W REINFORCEMENT 56X77"

## (undated) DEVICE — STAPLER COVIDIEN ENDO GIA STANDARD HANDLE

## (undated) DEVICE — SYR LUER LOK 10CC

## (undated) DEVICE — PACK GENERAL LAPAROSCOPY

## (undated) DEVICE — ELCTR BOVIE TIP BLADE INSULATED 2.75" EDGE

## (undated) DEVICE — SOL IRR POUR H2O 1000ML

## (undated) DEVICE — SUT POLYSORB 3-0 30" V-20 UNDYED

## (undated) DEVICE — BLADE SCALPEL SAFETYLOCK #15

## (undated) DEVICE — SHEARS HARMONIC ACE PLUS 7 5MM X 36CM CURVED TIP

## (undated) DEVICE — Device

## (undated) DEVICE — TROCAR COVIDIEN VERSAPORT BLADELESS OPTICAL 5MM STANDARD

## (undated) DEVICE — PLV-STRYKER LAPAROSCOPE 10MM 30 DEGREE: Type: DURABLE MEDICAL EQUIPMENT

## (undated) DEVICE — SUT POLYSORB 0 30" GU-46

## (undated) DEVICE — WARMING BLANKET UPPER ADULT

## (undated) DEVICE — D HELP - CLEARVIEW CLEARIFY SYSTEM

## (undated) DEVICE — TROCAR COVIDIEN VERSAONE BLUNT TIP HASSAN 12MM